# Patient Record
Sex: FEMALE | Race: WHITE | Employment: OTHER
[De-identification: names, ages, dates, MRNs, and addresses within clinical notes are randomized per-mention and may not be internally consistent; named-entity substitution may affect disease eponyms.]

---

## 2017-01-03 ENCOUNTER — OFFICE VISIT (OUTPATIENT)
Dept: CARDIOLOGY | Facility: CLINIC | Age: 67
End: 2017-01-03

## 2017-01-03 VITALS
SYSTOLIC BLOOD PRESSURE: 163 MMHG | OXYGEN SATURATION: 97 % | RESPIRATION RATE: 16 BRPM | HEART RATE: 81 BPM | BODY MASS INDEX: 25.83 KG/M2 | DIASTOLIC BLOOD PRESSURE: 95 MMHG | WEIGHT: 145.8 LBS | HEIGHT: 63 IN

## 2017-01-03 DIAGNOSIS — I20.9 ANGINA, CLASS II (HCC): Primary | ICD-10-CM

## 2017-01-03 DIAGNOSIS — I25.10 CORONARY ARTERY DISEASE INVOLVING NATIVE CORONARY ARTERY OF NATIVE HEART WITHOUT ANGINA PECTORIS: Chronic | ICD-10-CM

## 2017-01-03 PROCEDURE — 99213 OFFICE O/P EST LOW 20 MIN: CPT | Performed by: FAMILY MEDICINE

## 2017-05-03 ENCOUNTER — HOSPITAL ENCOUNTER (OUTPATIENT)
Age: 67
Discharge: HOME | End: 2017-05-03
Payer: MEDICARE

## 2017-05-03 ENCOUNTER — HOSPITAL ENCOUNTER (OUTPATIENT)
Dept: GENERAL RADIOLOGY | Age: 67
Discharge: HOME | End: 2017-05-03
Payer: MEDICARE

## 2017-05-03 DIAGNOSIS — M25.511 ACUTE PAIN OF RIGHT SHOULDER: ICD-10-CM

## 2017-05-03 PROCEDURE — 73030 X-RAY EXAM OF SHOULDER: CPT

## 2017-05-09 ENCOUNTER — HOSPITAL ENCOUNTER (OUTPATIENT)
Age: 67
Discharge: HOME | End: 2017-05-09
Payer: MEDICARE

## 2017-05-09 DIAGNOSIS — E78.5 HYPERLIPIDEMIA WITH TARGET LDL LESS THAN 70: Chronic | ICD-10-CM

## 2017-05-09 LAB
ALT SERPL-CCNC: 25 U/L (ref 5–33)
AST SERPL-CCNC: 23 U/L
CHOLESTEROL, FASTING: 204 MG/DL
CHOLESTEROL/HDL RATIO: 3.2
HDLC SERPL-MCNC: 64 MG/DL
LDL CHOLESTEROL: 108 MG/DL (ref 0–130)
TRIGLYCERIDE, FASTING: 158 MG/DL
VLDLC SERPL CALC-MCNC: ABNORMAL MG/DL (ref 1–30)

## 2017-05-09 PROCEDURE — 36415 COLL VENOUS BLD VENIPUNCTURE: CPT

## 2017-05-09 PROCEDURE — 80061 LIPID PANEL: CPT

## 2017-05-09 PROCEDURE — 84450 TRANSFERASE (AST) (SGOT): CPT

## 2017-05-09 PROCEDURE — 84460 ALANINE AMINO (ALT) (SGPT): CPT

## 2017-07-11 ENCOUNTER — OFFICE VISIT (OUTPATIENT)
Dept: CARDIOLOGY | Age: 67
End: 2017-07-11
Payer: MEDICARE

## 2017-07-11 VITALS
HEART RATE: 68 BPM | BODY MASS INDEX: 24.38 KG/M2 | SYSTOLIC BLOOD PRESSURE: 136 MMHG | RESPIRATION RATE: 16 BRPM | HEIGHT: 63 IN | WEIGHT: 137.6 LBS | OXYGEN SATURATION: 96 % | DIASTOLIC BLOOD PRESSURE: 85 MMHG

## 2017-07-11 DIAGNOSIS — I25.10 CORONARY ARTERY DISEASE INVOLVING NATIVE CORONARY ARTERY OF NATIVE HEART WITHOUT ANGINA PECTORIS: Chronic | ICD-10-CM

## 2017-07-11 DIAGNOSIS — I20.9 ANGINA, CLASS II (HCC): Primary | ICD-10-CM

## 2017-07-11 PROCEDURE — 1123F ACP DISCUSS/DSCN MKR DOCD: CPT | Performed by: FAMILY MEDICINE

## 2017-07-11 PROCEDURE — 1036F TOBACCO NON-USER: CPT | Performed by: FAMILY MEDICINE

## 2017-07-11 PROCEDURE — 1090F PRES/ABSN URINE INCON ASSESS: CPT | Performed by: FAMILY MEDICINE

## 2017-07-11 PROCEDURE — 99213 OFFICE O/P EST LOW 20 MIN: CPT | Performed by: FAMILY MEDICINE

## 2017-07-11 PROCEDURE — 3017F COLORECTAL CA SCREEN DOC REV: CPT | Performed by: FAMILY MEDICINE

## 2017-07-11 PROCEDURE — 93000 ELECTROCARDIOGRAM COMPLETE: CPT | Performed by: FAMILY MEDICINE

## 2017-07-11 PROCEDURE — G8598 ASA/ANTIPLAT THER USED: HCPCS | Performed by: FAMILY MEDICINE

## 2017-07-11 PROCEDURE — 3014F SCREEN MAMMO DOC REV: CPT | Performed by: FAMILY MEDICINE

## 2017-07-11 PROCEDURE — G8400 PT W/DXA NO RESULTS DOC: HCPCS | Performed by: FAMILY MEDICINE

## 2017-07-11 PROCEDURE — 4040F PNEUMOC VAC/ADMIN/RCVD: CPT | Performed by: FAMILY MEDICINE

## 2017-07-11 PROCEDURE — G8427 DOCREV CUR MEDS BY ELIG CLIN: HCPCS | Performed by: FAMILY MEDICINE

## 2017-07-11 PROCEDURE — G8420 CALC BMI NORM PARAMETERS: HCPCS | Performed by: FAMILY MEDICINE

## 2017-07-11 RX ORDER — DORZOLAMIDE HYDROCHLORIDE AND TIMOLOL MALEATE 20; 5 MG/ML; MG/ML
1 SOLUTION/ DROPS OPHTHALMIC 2 TIMES DAILY
COMMUNITY
Start: 2017-06-29 | End: 2017-09-19 | Stop reason: ALTCHOICE

## 2017-07-11 RX ORDER — AMLODIPINE BESYLATE 5 MG/1
2.5 TABLET ORAL DAILY
Qty: 45 TABLET | Refills: 3 | Status: SHIPPED | OUTPATIENT
Start: 2017-07-11 | End: 2019-04-26 | Stop reason: SDUPTHER

## 2017-09-19 ENCOUNTER — OFFICE VISIT (OUTPATIENT)
Dept: OBGYN | Age: 67
End: 2017-09-19
Payer: MEDICARE

## 2017-09-19 VITALS
BODY MASS INDEX: 23.67 KG/M2 | WEIGHT: 133.6 LBS | HEIGHT: 63 IN | DIASTOLIC BLOOD PRESSURE: 76 MMHG | SYSTOLIC BLOOD PRESSURE: 150 MMHG

## 2017-09-19 DIAGNOSIS — Z01.419 WOMEN'S ANNUAL ROUTINE GYNECOLOGICAL EXAMINATION: Primary | ICD-10-CM

## 2017-09-19 DIAGNOSIS — Z12.31 SCREENING MAMMOGRAM, ENCOUNTER FOR: ICD-10-CM

## 2017-09-19 PROCEDURE — G0101 CA SCREEN;PELVIC/BREAST EXAM: HCPCS | Performed by: ADVANCED PRACTICE MIDWIFE

## 2017-09-19 PROCEDURE — G8598 ASA/ANTIPLAT THER USED: HCPCS | Performed by: ADVANCED PRACTICE MIDWIFE

## 2017-11-02 ENCOUNTER — HOSPITAL ENCOUNTER (OUTPATIENT)
Dept: WOMENS IMAGING | Age: 67
Discharge: HOME | End: 2017-11-02
Payer: MEDICARE

## 2017-11-02 DIAGNOSIS — Z12.31 SCREENING MAMMOGRAM, ENCOUNTER FOR: ICD-10-CM

## 2017-11-02 PROCEDURE — G0202 SCR MAMMO BI INCL CAD: HCPCS

## 2017-11-29 RX ORDER — PANTOPRAZOLE SODIUM 20 MG/1
TABLET, DELAYED RELEASE ORAL
Qty: 90 TABLET | Refills: 3 | Status: SHIPPED | OUTPATIENT
Start: 2017-11-29 | End: 2018-11-12 | Stop reason: SDUPTHER

## 2017-11-29 RX ORDER — ATORVASTATIN CALCIUM 40 MG/1
TABLET, FILM COATED ORAL
Qty: 90 TABLET | Refills: 3 | Status: SHIPPED | OUTPATIENT
Start: 2017-11-29 | End: 2018-06-18 | Stop reason: SDUPTHER

## 2017-12-18 ENCOUNTER — OFFICE VISIT (OUTPATIENT)
Dept: CARDIOLOGY | Age: 67
End: 2017-12-18
Payer: MEDICARE

## 2017-12-18 VITALS
RESPIRATION RATE: 18 BRPM | SYSTOLIC BLOOD PRESSURE: 122 MMHG | WEIGHT: 138 LBS | OXYGEN SATURATION: 98 % | BODY MASS INDEX: 24.45 KG/M2 | HEIGHT: 63 IN | DIASTOLIC BLOOD PRESSURE: 78 MMHG | HEART RATE: 76 BPM

## 2017-12-18 DIAGNOSIS — I25.10 CORONARY ARTERY DISEASE INVOLVING NATIVE CORONARY ARTERY OF NATIVE HEART WITHOUT ANGINA PECTORIS: Chronic | ICD-10-CM

## 2017-12-18 DIAGNOSIS — R06.02 SHORTNESS OF BREATH ON EXERTION: Primary | ICD-10-CM

## 2017-12-18 DIAGNOSIS — I20.9 ANGINA, CLASS II (HCC): ICD-10-CM

## 2017-12-18 PROCEDURE — 3014F SCREEN MAMMO DOC REV: CPT | Performed by: FAMILY MEDICINE

## 2017-12-18 PROCEDURE — 99214 OFFICE O/P EST MOD 30 MIN: CPT | Performed by: FAMILY MEDICINE

## 2017-12-18 PROCEDURE — 1123F ACP DISCUSS/DSCN MKR DOCD: CPT | Performed by: FAMILY MEDICINE

## 2017-12-18 PROCEDURE — G8598 ASA/ANTIPLAT THER USED: HCPCS | Performed by: FAMILY MEDICINE

## 2017-12-18 PROCEDURE — 1090F PRES/ABSN URINE INCON ASSESS: CPT | Performed by: FAMILY MEDICINE

## 2017-12-18 PROCEDURE — G8400 PT W/DXA NO RESULTS DOC: HCPCS | Performed by: FAMILY MEDICINE

## 2017-12-18 PROCEDURE — G8420 CALC BMI NORM PARAMETERS: HCPCS | Performed by: FAMILY MEDICINE

## 2017-12-18 PROCEDURE — G8484 FLU IMMUNIZE NO ADMIN: HCPCS | Performed by: FAMILY MEDICINE

## 2017-12-18 PROCEDURE — 1036F TOBACCO NON-USER: CPT | Performed by: FAMILY MEDICINE

## 2017-12-18 PROCEDURE — 4040F PNEUMOC VAC/ADMIN/RCVD: CPT | Performed by: FAMILY MEDICINE

## 2017-12-18 PROCEDURE — G8427 DOCREV CUR MEDS BY ELIG CLIN: HCPCS | Performed by: FAMILY MEDICINE

## 2017-12-18 PROCEDURE — 3017F COLORECTAL CA SCREEN DOC REV: CPT | Performed by: FAMILY MEDICINE

## 2017-12-18 RX ORDER — CYCLOSPORINE 0.5 MG/ML
1 EMULSION OPHTHALMIC 2 TIMES DAILY PRN
COMMUNITY
Start: 2017-09-29

## 2017-12-18 RX ORDER — DORZOLAMIDE HYDROCHLORIDE AND TIMOLOL MALEATE 20; 5 MG/ML; MG/ML
1 SOLUTION/ DROPS OPHTHALMIC 2 TIMES DAILY
COMMUNITY
Start: 2017-09-29 | End: 2019-05-17 | Stop reason: ALTCHOICE

## 2017-12-18 NOTE — PROGRESS NOTES
LUIS Carter am scribing for and in the presence of Dr. Diony Shaw    Patient: Brenda Barriga  : 1950  Date of Visit: 2017    REASON FOR VISIT / CONSULTATION: CP, CAD,    Dear Dr. Jamshid Fuller    I had the pleasure of seeing your patient Brenda Barriga in followup today. As you know, Ms. Marquez is a 79 y.o. female with a history of coronary artery disease s/p 4 stents, the most recent one being in . Ms. Marty Medrano reports doing realativly well since last visit but does complain of some increased shortness of breath with exertion and reports feeling fatigue. She reports having fair exercise capacity and denies any lower extremity swelling. She also denied any current or recent chest pain,abdominal pain, bleeding problems, problems with her medications or any other concerns at this time      Past Medical History:   Diagnosis Date    CAD (coronary artery disease) ,     4 total stents. 2 Stents in RCA, 1 in small OM1.  H/O cardiovascular stress test 13, 14    Relatively normal without evidence of significant ischemia or infarction. global LV systolic function was normal w/o regional wall motion abnormalities. No significant EKG evidence of ischemia during monitoring w/o significant associated arrhythmias.  Tay score is 5.     H/O echocardiogram 13, 14    EF>55%, mild concentric LVH    Hx of heart artery stent 10/06/2011    Lesion of Prox RCA 75% stenosis-Drug Eluting stent to RCA    Hx of percutaneous left heart catheterization 2011    LMCA, LAD & RAMUS- normal, LCX-lesion on Mid CX 40% stenosis, RCA-Patent stent, EF 60%    Hx of percutaneous left heart catheterization 2012    Normal renals, Patent RCA stents with mid LCX stenosis IVUS suggest 60-70% stenosis and due to continues ischemia, a LISA was inserted    Hx of percutaneous left heart catheterization 2012    LMCA & LAD -Normal, LCXMild luminal irregularities, Patent prior stent, RCA-Patent stents in Prox and mid segment    Hyperlipidemia     Normal nuclear stress test 06/06/2012       CURRENT ALLERGIES: Benadryl [diphenhydramine]; Iv dye [iodides]; and Pcn [penicillins] REVIEW OF SYSTEMS: 10 systems were reviewed. Pertinent positives and negatives as above, all else negative. Past Surgical History:   Procedure Laterality Date    CARPAL TUNNEL RELEASE      CORONARY ANGIOPLASTY WITH STENT PLACEMENT      NECK SURGERY      disc replaced in neck    Social History:  Social History   Substance Use Topics    Smoking status: Former Smoker     Packs/day: 2.00     Years: 20.00     Types: Cigarettes     Quit date: 5/7/2000    Smokeless tobacco: Never Used    Alcohol use Yes      Comment: social        CURRENT MEDICATIONS:  Outpatient Prescriptions Marked as Taking for the 12/18/17 encounter (Office Visit) with Geovanni Saba MD   Medication Sig Dispense Refill    RESTASIS 0.05 % ophthalmic emulsion       dorzolamide-timolol (COSOPT) 22.3-6.8 MG/ML ophthalmic solution Place 1 drop into the right eye 2 times daily       atorvastatin (LIPITOR) 40 MG tablet TAKE 1 TABLET BY MOUTH  DAILY 90 tablet 3    pantoprazole (PROTONIX) 20 MG tablet TAKE 1 TABLET BY MOUTH  DAILY 90 tablet 3    budesonide-formoterol (SYMBICORT) 160-4.5 MCG/ACT AERO Inhale 2 puffs into the lungs 2 times daily 3 Inhaler 3    rOPINIRole (REQUIP) 0.5 MG tablet Take 1 tablet by mouth See Admin Instructions Take 2 tablets every day. Take 1-3 hours before bedtime.  180 tablet 3    amLODIPine (NORVASC) 5 MG tablet Take 0.5 tablets by mouth daily 45 tablet 3    albuterol sulfate HFA (PROAIR HFA) 108 (90 BASE) MCG/ACT inhaler Inhale 2 puffs into the lungs every 6 hours as needed for Wheezing 3 Inhaler 3    nitroGLYCERIN (NITROSTAT) 0.4 MG SL tablet Place 1 tablet under the tongue every 5 minutes as needed 25 tablet 3    latanoprost (XALATAN) 0.005 % ophthalmic solution Place 1 drop into both eyes nightly       CO2 25 12/12/2014    INR 1.0 06/04/2012        ASSESSMENT:  Encounter Diagnoses   Name Primary?  Angina, class II ([facility] [address] Yes    Coronary artery disease involving native coronary artery of native heart without angina pectoris         PLAN:  Angina: [place] Class II-III: I think that her increased shortness of breath may be a anginal equivalent for her. · Continue Amlidipine  · Additional Testing: Because of her current increased shortness of breath with exertion which can certainly be caused by significant coronary artery disease, I ordered a treadmill stress test with SPECT imaging to try and rule out this possibility. · Atherosclerotic Heart Disease:  Antiplatelet Agent: Continue Aspirin 81 mg daily. Beta blocker/CCB: unable to tolerate beta-blocker. Continue amlodipine. · Statin Therapy: Continue atorvastatin (Lipitor) 40 mg nightly. · Additional Testing: I ordered a treadmill stress test with Myoview imaging to help risk stratify the likelihood of ischemia as the source of her symptoms. Finally, I recommended that she continue her other medications and follow up with you as previously scheduled. FOLLOW UP:   I told Ms. Marquez to call my office if she had any problems, but otherwise told her to Return in about 6 months (around 6/18/2018). However, I would be happy to see her sooner should the need arise. Once again, thank you for allowing me to participate in this patients care. Please do not hesitate to contact me could I be of further assistance. Sincerely,  Angie Ackerman. Bud BECKMAN, MS, F.A.C.C. [facility] Specialist, [address] [facility], [facility], [place] [address], [address] [facility]  Phone: [phone], Fax: [phone]     The documentation recorded by the scribe, accurately and completely reflects the services I personally performed and the decisions made by me. Latasha Avalos.  Richie Rosen MD, MS, F.A.C.C. 12/18/2017     I believe that the risk of significant morbidity and mortality related to the patient's current medical conditions are: Intermediate.

## 2017-12-28 ENCOUNTER — HOSPITAL ENCOUNTER (OUTPATIENT)
Dept: NON INVASIVE DIAGNOSTICS | Age: 67
Discharge: HOME | End: 2017-12-28
Payer: MEDICARE

## 2017-12-28 DIAGNOSIS — I25.10 CORONARY ARTERY DISEASE INVOLVING NATIVE CORONARY ARTERY OF NATIVE HEART WITHOUT ANGINA PECTORIS: Chronic | ICD-10-CM

## 2017-12-28 DIAGNOSIS — I20.9 ANGINA, CLASS II (HCC): ICD-10-CM

## 2017-12-28 PROCEDURE — 3430000000 HC RX DIAGNOSTIC RADIOPHARMACEUTICAL: Performed by: FAMILY MEDICINE

## 2017-12-28 PROCEDURE — A9500 TC99M SESTAMIBI: HCPCS | Performed by: FAMILY MEDICINE

## 2017-12-28 PROCEDURE — 93017 CV STRESS TEST TRACING ONLY: CPT

## 2017-12-28 PROCEDURE — 78452 HT MUSCLE IMAGE SPECT MULT: CPT

## 2017-12-28 RX ADMIN — Medication 30 MILLICURIE: at 08:59

## 2017-12-29 ENCOUNTER — HOSPITAL ENCOUNTER (OUTPATIENT)
Dept: NON INVASIVE DIAGNOSTICS | Age: 67
Discharge: HOME | End: 2017-12-29
Payer: MEDICARE

## 2017-12-29 PROCEDURE — A9500 TC99M SESTAMIBI: HCPCS | Performed by: FAMILY MEDICINE

## 2017-12-29 PROCEDURE — 3430000000 HC RX DIAGNOSTIC RADIOPHARMACEUTICAL: Performed by: FAMILY MEDICINE

## 2017-12-29 RX ADMIN — Medication 30.9 MILLICURIE: at 12:37

## 2018-01-05 NOTE — PROCEDURES
significant electrocardiographic evidence of myocardial ischemia  during EKG monitoring without significant associated arrhythmias. Overall, these results are most consistent with a low/intermediate risk for  significant coronary artery disease. Depending on the patient symptoms and level of clinical suspicion,  aggressive medical management vs. additional testing by coronary  angiography may be indicated. The sensitivity for detecting ischemia on this test may have been reduced  due to the patient being on a calcium channel blocker. King Jay    D: 01/05/2018 6:55:59       T: 01/05/2018 6:59:13     SYDNEY/DAVID_EDIT  Job#: [id number]    Doc#: [id number]    CC:  Roger Trejo.  Barrett Johnson

## 2018-05-09 ENCOUNTER — HOSPITAL ENCOUNTER (OUTPATIENT)
Age: 68
Discharge: HOME | End: 2018-05-09
Payer: MEDICARE

## 2018-05-09 DIAGNOSIS — R20.2 NUMBNESS AND TINGLING OF FOOT: ICD-10-CM

## 2018-05-09 DIAGNOSIS — R20.0 NUMBNESS AND TINGLING OF FOOT: ICD-10-CM

## 2018-05-09 DIAGNOSIS — R53.83 FATIGUE, UNSPECIFIED TYPE: ICD-10-CM

## 2018-05-09 LAB
ABSOLUTE EOS #: 0.17 K/UL (ref 0–0.44)
ABSOLUTE IMMATURE GRANULOCYTE: <0.03 K/UL (ref 0–0.3)
ABSOLUTE LYMPH #: 1.32 K/UL (ref 1.1–3.7)
ABSOLUTE MONO #: 0.44 K/UL (ref 0.1–1.2)
ALBUMIN SERPL-MCNC: 4.2 G/DL (ref 3.5–5.2)
ALBUMIN/GLOBULIN RATIO: 1.4 (ref 1–2.5)
ALP BLD-CCNC: 79 U/L (ref 35–104)
ALT SERPL-CCNC: 21 U/L (ref 5–33)
ANION GAP SERPL CALCULATED.3IONS-SCNC: 14 MMOL/L (ref 9–17)
AST SERPL-CCNC: 20 U/L
BASOPHILS # BLD: 1 % (ref 0–2)
BASOPHILS ABSOLUTE: 0.03 K/UL (ref 0–0.2)
BILIRUB SERPL-MCNC: 0.4 MG/DL (ref 0.3–1.2)
BUN BLDV-MCNC: 13 MG/DL (ref 8–23)
BUN/CREAT BLD: 28 (ref 9–20)
CALCIUM SERPL-MCNC: 9.1 MG/DL (ref 8.6–10.4)
CHLORIDE BLD-SCNC: 100 MMOL/L (ref 98–107)
CO2: 24 MMOL/L (ref 20–31)
CREAT SERPL-MCNC: 0.46 MG/DL (ref 0.5–0.9)
DIFFERENTIAL TYPE: NORMAL
EOSINOPHILS RELATIVE PERCENT: 3 % (ref 1–4)
GFR AFRICAN AMERICAN: >60 ML/MIN
GFR NON-AFRICAN AMERICAN: >60 ML/MIN
GFR SERPL CREATININE-BSD FRML MDRD: ABNORMAL ML/MIN/{1.73_M2}
GFR SERPL CREATININE-BSD FRML MDRD: ABNORMAL ML/MIN/{1.73_M2}
GLUCOSE BLD-MCNC: 94 MG/DL (ref 70–99)
HCT VFR BLD CALC: 40.3 % (ref 36.3–47.1)
HEMOGLOBIN: 13.5 G/DL (ref 11.9–15.1)
IMMATURE GRANULOCYTES: 0 %
LYMPHOCYTES # BLD: 25 % (ref 24–43)
MCH RBC QN AUTO: 32.1 PG (ref 25.2–33.5)
MCHC RBC AUTO-ENTMCNC: 33.5 G/DL (ref 28.4–34.8)
MCV RBC AUTO: 96 FL (ref 82.6–102.9)
MONOCYTES # BLD: 8 % (ref 3–12)
NRBC AUTOMATED: 0 PER 100 WBC
PDW BLD-RTO: 12.5 % (ref 11.8–14.4)
PLATELET # BLD: 252 K/UL (ref 138–453)
PLATELET ESTIMATE: NORMAL
PMV BLD AUTO: 8.7 FL (ref 8.1–13.5)
POTASSIUM SERPL-SCNC: 4.2 MMOL/L (ref 3.7–5.3)
RBC # BLD: 4.2 M/UL (ref 3.95–5.11)
RBC # BLD: NORMAL 10*6/UL
SEG NEUTROPHILS: 63 % (ref 36–65)
SEGMENTED NEUTROPHILS ABSOLUTE COUNT: 3.38 K/UL (ref 1.5–8.1)
SODIUM BLD-SCNC: 138 MMOL/L (ref 135–144)
TOTAL PROTEIN: 7.3 G/DL (ref 6.4–8.3)
TSH SERPL DL<=0.05 MIU/L-ACNC: 3.09 MIU/L (ref 0.3–5)
WBC # BLD: 5.4 K/UL (ref 3.5–11.3)
WBC # BLD: NORMAL 10*3/UL

## 2018-05-09 PROCEDURE — 84443 ASSAY THYROID STIM HORMONE: CPT

## 2018-05-09 PROCEDURE — 36415 COLL VENOUS BLD VENIPUNCTURE: CPT

## 2018-05-09 PROCEDURE — 85025 COMPLETE CBC W/AUTO DIFF WBC: CPT

## 2018-05-09 PROCEDURE — 80053 COMPREHEN METABOLIC PANEL: CPT

## 2018-05-16 ENCOUNTER — HOSPITAL ENCOUNTER (OUTPATIENT)
Dept: VASCULAR LAB | Age: 68
Discharge: HOME | End: 2018-05-18
Payer: MEDICARE

## 2018-05-16 DIAGNOSIS — R20.0 NUMBNESS AND TINGLING OF FOOT: ICD-10-CM

## 2018-05-16 DIAGNOSIS — I25.10 CORONARY ARTERY DISEASE INVOLVING NATIVE CORONARY ARTERY OF NATIVE HEART WITHOUT ANGINA PECTORIS: Chronic | ICD-10-CM

## 2018-05-16 DIAGNOSIS — R20.2 NUMBNESS AND TINGLING OF FOOT: ICD-10-CM

## 2018-05-16 DIAGNOSIS — R09.89 OTHER SPECIFIED SYMPTOMS AND SIGNS INVOLVING THE CIRCULATORY AND RESPIRATORY SYSTEMS: ICD-10-CM

## 2018-05-16 PROCEDURE — 93923 UPR/LXTR ART STDY 3+ LVLS: CPT

## 2018-05-23 ENCOUNTER — INITIAL CONSULT (OUTPATIENT)
Dept: VASCULAR SURGERY | Age: 68
End: 2018-05-23
Payer: MEDICARE

## 2018-05-23 ENCOUNTER — HOSPITAL ENCOUNTER (OUTPATIENT)
Age: 68
Discharge: HOME | End: 2018-05-23
Payer: MEDICARE

## 2018-05-23 VITALS
HEART RATE: 76 BPM | DIASTOLIC BLOOD PRESSURE: 84 MMHG | BODY MASS INDEX: 24.56 KG/M2 | WEIGHT: 138.6 LBS | HEIGHT: 63 IN | RESPIRATION RATE: 17 BRPM | SYSTOLIC BLOOD PRESSURE: 163 MMHG

## 2018-05-23 DIAGNOSIS — I77.6 VASCULITIS (HCC): ICD-10-CM

## 2018-05-23 DIAGNOSIS — I77.6 VASCULITIS (HCC): Primary | ICD-10-CM

## 2018-05-23 LAB
C-REACTIVE PROTEIN: 2.5 MG/L (ref 0–5)
SEDIMENTATION RATE, ERYTHROCYTE: 14 MM (ref 0–20)

## 2018-05-23 PROCEDURE — 1090F PRES/ABSN URINE INCON ASSESS: CPT | Performed by: INTERNAL MEDICINE

## 2018-05-23 PROCEDURE — 99203 OFFICE O/P NEW LOW 30 MIN: CPT | Performed by: INTERNAL MEDICINE

## 2018-05-23 PROCEDURE — G8400 PT W/DXA NO RESULTS DOC: HCPCS | Performed by: INTERNAL MEDICINE

## 2018-05-23 PROCEDURE — 1123F ACP DISCUSS/DSCN MKR DOCD: CPT | Performed by: INTERNAL MEDICINE

## 2018-05-23 PROCEDURE — 85651 RBC SED RATE NONAUTOMATED: CPT

## 2018-05-23 PROCEDURE — 1036F TOBACCO NON-USER: CPT | Performed by: INTERNAL MEDICINE

## 2018-05-23 PROCEDURE — 86140 C-REACTIVE PROTEIN: CPT

## 2018-05-23 PROCEDURE — 4040F PNEUMOC VAC/ADMIN/RCVD: CPT | Performed by: INTERNAL MEDICINE

## 2018-05-23 PROCEDURE — G8420 CALC BMI NORM PARAMETERS: HCPCS | Performed by: INTERNAL MEDICINE

## 2018-05-23 PROCEDURE — G8427 DOCREV CUR MEDS BY ELIG CLIN: HCPCS | Performed by: INTERNAL MEDICINE

## 2018-05-23 PROCEDURE — 3017F COLORECTAL CA SCREEN DOC REV: CPT | Performed by: INTERNAL MEDICINE

## 2018-05-23 PROCEDURE — G8598 ASA/ANTIPLAT THER USED: HCPCS | Performed by: INTERNAL MEDICINE

## 2018-05-23 PROCEDURE — 36415 COLL VENOUS BLD VENIPUNCTURE: CPT

## 2018-05-25 ENCOUNTER — HOSPITAL ENCOUNTER (OUTPATIENT)
Age: 68
Discharge: HOME | End: 2018-05-27
Payer: MEDICARE

## 2018-05-25 ENCOUNTER — HOSPITAL ENCOUNTER (OUTPATIENT)
Dept: GENERAL RADIOLOGY | Age: 68
Discharge: HOME | End: 2018-05-27
Payer: MEDICARE

## 2018-05-25 DIAGNOSIS — M54.2 NECK PAIN: ICD-10-CM

## 2018-05-25 PROCEDURE — 72050 X-RAY EXAM NECK SPINE 4/5VWS: CPT

## 2018-05-29 ENCOUNTER — HOSPITAL ENCOUNTER (OUTPATIENT)
Dept: MRI IMAGING | Age: 68
Discharge: HOME | End: 2018-05-31
Payer: MEDICARE

## 2018-05-29 DIAGNOSIS — M54.2 NECK PAIN: ICD-10-CM

## 2018-05-29 PROCEDURE — 72141 MRI NECK SPINE W/O DYE: CPT

## 2018-06-06 ENCOUNTER — OFFICE VISIT (OUTPATIENT)
Dept: VASCULAR SURGERY | Age: 68
End: 2018-06-06
Payer: MEDICARE

## 2018-06-06 VITALS
HEART RATE: 81 BPM | SYSTOLIC BLOOD PRESSURE: 112 MMHG | BODY MASS INDEX: 25.05 KG/M2 | TEMPERATURE: 98.8 F | RESPIRATION RATE: 16 BRPM | HEIGHT: 63 IN | WEIGHT: 141.4 LBS | DIASTOLIC BLOOD PRESSURE: 76 MMHG

## 2018-06-06 DIAGNOSIS — I73.9 PVD (PERIPHERAL VASCULAR DISEASE) WITH CLAUDICATION (HCC): Primary | ICD-10-CM

## 2018-06-06 PROCEDURE — 3017F COLORECTAL CA SCREEN DOC REV: CPT | Performed by: INTERNAL MEDICINE

## 2018-06-06 PROCEDURE — 1090F PRES/ABSN URINE INCON ASSESS: CPT | Performed by: INTERNAL MEDICINE

## 2018-06-06 PROCEDURE — 99213 OFFICE O/P EST LOW 20 MIN: CPT | Performed by: INTERNAL MEDICINE

## 2018-06-06 PROCEDURE — G8419 CALC BMI OUT NRM PARAM NOF/U: HCPCS | Performed by: INTERNAL MEDICINE

## 2018-06-06 PROCEDURE — G8427 DOCREV CUR MEDS BY ELIG CLIN: HCPCS | Performed by: INTERNAL MEDICINE

## 2018-06-06 RX ORDER — ROPINIROLE 0.5 MG/1
1 TABLET, FILM COATED ORAL DAILY
COMMUNITY
End: 2018-10-03 | Stop reason: SDUPTHER

## 2018-06-13 PROBLEM — I73.9 PVD (PERIPHERAL VASCULAR DISEASE) WITH CLAUDICATION (HCC): Status: ACTIVE | Noted: 2018-06-13

## 2018-06-18 ENCOUNTER — OFFICE VISIT (OUTPATIENT)
Dept: CARDIOLOGY | Age: 68
End: 2018-06-18
Payer: MEDICARE

## 2018-06-18 VITALS
BODY MASS INDEX: 24.98 KG/M2 | HEIGHT: 63 IN | HEART RATE: 79 BPM | DIASTOLIC BLOOD PRESSURE: 85 MMHG | SYSTOLIC BLOOD PRESSURE: 130 MMHG | WEIGHT: 141 LBS

## 2018-06-18 DIAGNOSIS — R94.39 ABNORMAL STRESS TEST: Primary | ICD-10-CM

## 2018-06-18 DIAGNOSIS — G47.33 OSA (OBSTRUCTIVE SLEEP APNEA): ICD-10-CM

## 2018-06-18 DIAGNOSIS — E78.5 HYPERLIPIDEMIA WITH TARGET LDL LESS THAN 70: Chronic | ICD-10-CM

## 2018-06-18 DIAGNOSIS — R53.83 FATIGUE, UNSPECIFIED TYPE: ICD-10-CM

## 2018-06-18 DIAGNOSIS — I25.10 CORONARY ARTERY DISEASE INVOLVING NATIVE CORONARY ARTERY OF NATIVE HEART WITHOUT ANGINA PECTORIS: Chronic | ICD-10-CM

## 2018-06-18 DIAGNOSIS — I20.9 ANGINA, CLASS II (HCC): ICD-10-CM

## 2018-06-18 PROCEDURE — G8400 PT W/DXA NO RESULTS DOC: HCPCS | Performed by: FAMILY MEDICINE

## 2018-06-18 PROCEDURE — 1036F TOBACCO NON-USER: CPT | Performed by: FAMILY MEDICINE

## 2018-06-18 PROCEDURE — G8420 CALC BMI NORM PARAMETERS: HCPCS | Performed by: FAMILY MEDICINE

## 2018-06-18 PROCEDURE — G8598 ASA/ANTIPLAT THER USED: HCPCS | Performed by: FAMILY MEDICINE

## 2018-06-18 PROCEDURE — G8427 DOCREV CUR MEDS BY ELIG CLIN: HCPCS | Performed by: FAMILY MEDICINE

## 2018-06-18 PROCEDURE — 3017F COLORECTAL CA SCREEN DOC REV: CPT | Performed by: FAMILY MEDICINE

## 2018-06-18 PROCEDURE — 99215 OFFICE O/P EST HI 40 MIN: CPT | Performed by: FAMILY MEDICINE

## 2018-06-18 PROCEDURE — 93000 ELECTROCARDIOGRAM COMPLETE: CPT | Performed by: FAMILY MEDICINE

## 2018-06-18 PROCEDURE — 1090F PRES/ABSN URINE INCON ASSESS: CPT | Performed by: FAMILY MEDICINE

## 2018-06-18 PROCEDURE — 1123F ACP DISCUSS/DSCN MKR DOCD: CPT | Performed by: FAMILY MEDICINE

## 2018-06-18 PROCEDURE — 4040F PNEUMOC VAC/ADMIN/RCVD: CPT | Performed by: FAMILY MEDICINE

## 2018-06-18 RX ORDER — ATORVASTATIN CALCIUM 40 MG/1
40 TABLET, FILM COATED ORAL DAILY
Qty: 90 TABLET | Refills: 3 | Status: SHIPPED | OUTPATIENT
Start: 2018-06-18 | End: 2019-04-01 | Stop reason: SDUPTHER

## 2018-07-02 ENCOUNTER — TELEPHONE (OUTPATIENT)
Dept: CARDIOLOGY | Age: 68
End: 2018-07-02

## 2018-08-06 ENCOUNTER — HOSPITAL ENCOUNTER (OUTPATIENT)
Age: 68
Discharge: HOME | End: 2018-08-06
Payer: MEDICARE

## 2018-08-06 ENCOUNTER — OFFICE VISIT (OUTPATIENT)
Dept: CARDIOLOGY | Age: 68
End: 2018-08-06
Payer: MEDICARE

## 2018-08-06 VITALS
WEIGHT: 142.4 LBS | HEART RATE: 81 BPM | HEIGHT: 63 IN | OXYGEN SATURATION: 98 % | BODY MASS INDEX: 25.23 KG/M2 | RESPIRATION RATE: 18 BRPM | DIASTOLIC BLOOD PRESSURE: 83 MMHG | SYSTOLIC BLOOD PRESSURE: 125 MMHG

## 2018-08-06 DIAGNOSIS — I25.10 ASHD (ARTERIOSCLEROTIC HEART DISEASE): ICD-10-CM

## 2018-08-06 DIAGNOSIS — E78.5 HYPERLIPIDEMIA WITH TARGET LDL LESS THAN 70: Chronic | ICD-10-CM

## 2018-08-06 DIAGNOSIS — G47.33 OSA (OBSTRUCTIVE SLEEP APNEA): ICD-10-CM

## 2018-08-06 DIAGNOSIS — I20.9 ANGINA, CLASS II (HCC): ICD-10-CM

## 2018-08-06 DIAGNOSIS — R94.39 ABNORMAL CARDIOVASCULAR STRESS TEST: ICD-10-CM

## 2018-08-06 DIAGNOSIS — R94.39 ABNORMAL CARDIOVASCULAR STRESS TEST: Primary | ICD-10-CM

## 2018-08-06 LAB
ANION GAP SERPL CALCULATED.3IONS-SCNC: 13 MMOL/L (ref 9–17)
BUN BLDV-MCNC: 11 MG/DL (ref 8–23)
BUN/CREAT BLD: 22 (ref 9–20)
CALCIUM SERPL-MCNC: 10 MG/DL (ref 8.6–10.4)
CHLORIDE BLD-SCNC: 99 MMOL/L (ref 98–107)
CO2: 29 MMOL/L (ref 20–31)
CREAT SERPL-MCNC: 0.51 MG/DL (ref 0.5–0.9)
GFR AFRICAN AMERICAN: >60 ML/MIN
GFR NON-AFRICAN AMERICAN: >60 ML/MIN
GFR SERPL CREATININE-BSD FRML MDRD: ABNORMAL ML/MIN/{1.73_M2}
GFR SERPL CREATININE-BSD FRML MDRD: ABNORMAL ML/MIN/{1.73_M2}
GLUCOSE BLD-MCNC: 80 MG/DL (ref 70–99)
HCT VFR BLD CALC: 41.3 % (ref 36.3–47.1)
HEMOGLOBIN: 13.8 G/DL (ref 11.9–15.1)
MCH RBC QN AUTO: 32.2 PG (ref 25.2–33.5)
MCHC RBC AUTO-ENTMCNC: 33.4 G/DL (ref 28.4–34.8)
MCV RBC AUTO: 96.3 FL (ref 82.6–102.9)
NRBC AUTOMATED: 0 PER 100 WBC
PDW BLD-RTO: 12.5 % (ref 11.8–14.4)
PLATELET # BLD: 293 K/UL (ref 138–453)
PMV BLD AUTO: 9.5 FL (ref 8.1–13.5)
POTASSIUM SERPL-SCNC: 4.1 MMOL/L (ref 3.7–5.3)
RBC # BLD: 4.29 M/UL (ref 3.95–5.11)
SODIUM BLD-SCNC: 141 MMOL/L (ref 135–144)
WBC # BLD: 6.8 K/UL (ref 3.5–11.3)

## 2018-08-06 PROCEDURE — 1101F PT FALLS ASSESS-DOCD LE1/YR: CPT | Performed by: FAMILY MEDICINE

## 2018-08-06 PROCEDURE — 85027 COMPLETE CBC AUTOMATED: CPT

## 2018-08-06 PROCEDURE — G8427 DOCREV CUR MEDS BY ELIG CLIN: HCPCS | Performed by: FAMILY MEDICINE

## 2018-08-06 PROCEDURE — 3017F COLORECTAL CA SCREEN DOC REV: CPT | Performed by: FAMILY MEDICINE

## 2018-08-06 PROCEDURE — G8400 PT W/DXA NO RESULTS DOC: HCPCS | Performed by: FAMILY MEDICINE

## 2018-08-06 PROCEDURE — 80048 BASIC METABOLIC PNL TOTAL CA: CPT

## 2018-08-06 PROCEDURE — 99215 OFFICE O/P EST HI 40 MIN: CPT | Performed by: FAMILY MEDICINE

## 2018-08-06 PROCEDURE — 1123F ACP DISCUSS/DSCN MKR DOCD: CPT | Performed by: FAMILY MEDICINE

## 2018-08-06 PROCEDURE — 36415 COLL VENOUS BLD VENIPUNCTURE: CPT

## 2018-08-06 PROCEDURE — 1090F PRES/ABSN URINE INCON ASSESS: CPT | Performed by: FAMILY MEDICINE

## 2018-08-06 PROCEDURE — 4040F PNEUMOC VAC/ADMIN/RCVD: CPT | Performed by: FAMILY MEDICINE

## 2018-08-06 PROCEDURE — G8419 CALC BMI OUT NRM PARAM NOF/U: HCPCS | Performed by: FAMILY MEDICINE

## 2018-08-06 PROCEDURE — 1036F TOBACCO NON-USER: CPT | Performed by: FAMILY MEDICINE

## 2018-08-06 PROCEDURE — G8598 ASA/ANTIPLAT THER USED: HCPCS | Performed by: FAMILY MEDICINE

## 2018-08-06 RX ORDER — PREDNISONE 20 MG/1
TABLET ORAL
Qty: 6 TABLET | Refills: 0 | Status: SHIPPED | OUTPATIENT
Start: 2018-08-06 | End: 2018-08-06

## 2018-08-06 RX ORDER — PREDNISONE 20 MG/1
TABLET ORAL
Qty: 6 TABLET | Refills: 0 | Status: SHIPPED | OUTPATIENT
Start: 2018-08-06 | End: 2018-08-16

## 2018-08-06 NOTE — PROGRESS NOTES
cardiovascular stress test 5/22/13, 6/25/14    Relatively normal without evidence of significant ischemia or infarction. global LV systolic function was normal w/o regional wall motion abnormalities. No significant EKG evidence of ischemia during monitoring w/o significant associated arrhythmias. Tay score is 5.     H/O cardiovascular stress test 12/28/2017    Larglely normal myocardial perfusion imaging with soft tissue artifact but w/o evidence of significant myocardial ischemia or infarction. results are most consistent with low/intermediate risk for significant CAD    H/O echocardiogram 5/21/13, 6/24/14    EF>55%, mild concentric LVH    Hx of heart artery stent 10/06/2011    Lesion of Prox RCA 75% stenosis-Drug Eluting stent to RCA    Hx of percutaneous left heart catheterization 04/07/2011    LMCA, LAD & RAMUS- normal, LCX-lesion on Mid CX 40% stenosis, RCA-Patent stent, EF 60%    Hx of percutaneous left heart catheterization 04/11/2012    Normal renals, Patent RCA stents with mid LCX stenosis IVUS suggest 60-70% stenosis and due to continues ischemia, a LISA was inserted    Hx of percutaneous left heart catheterization 09/27/2012    LMCA & LAD -Normal, LCXMild luminal irregularities, Patent prior stent, RCA-Patent stents in Prox and mid segment    Hyperlipidemia     Normal nuclear stress test 06/06/2012       CURRENT ALLERGIES: Benadryl [diphenhydramine]; Iv dye [iodides]; and Pcn [penicillins] REVIEW OF SYSTEMS: 10 systems were reviewed. Pertinent positives and negatives as above, all else negative.      Past Surgical History:   Procedure Laterality Date    CARPAL TUNNEL RELEASE      CORONARY ANGIOPLASTY WITH STENT PLACEMENT      NECK SURGERY      disc replaced in neck    Social History:  Social History   Substance Use Topics    Smoking status: Former Smoker     Packs/day: 2.00     Years: 20.00     Types: Cigarettes     Quit date: 5/7/2000    Smokeless tobacco: Never Used    Alcohol use Yes Comment: social        CURRENT MEDICATIONS:  Outpatient Prescriptions Marked as Taking for the 8/6/18 encounter (Office Visit) with Mesfin Buenrostro MD   Medication Sig Dispense Refill    atorvastatin (LIPITOR) 40 MG tablet Take 1 tablet by mouth daily 90 tablet 3    rOPINIRole (REQUIP) 0.5 MG tablet Take 1 mg by mouth daily      albuterol sulfate  (90 Base) MCG/ACT inhaler Inhale 2 puffs into the lungs every 6 hours as needed for Wheezing 3 Inhaler 3    RESTASIS 0.05 % ophthalmic emulsion Place 1 drop into both eyes 3 times daily       dorzolamide-timolol (COSOPT) 22.3-6.8 MG/ML ophthalmic solution Place 1 drop into the right eye 2 times daily       pantoprazole (PROTONIX) 20 MG tablet TAKE 1 TABLET BY MOUTH  DAILY 90 tablet 3    budesonide-formoterol (SYMBICORT) 160-4.5 MCG/ACT AERO Inhale 2 puffs into the lungs 2 times daily (Patient taking differently: Inhale 2 puffs into the lungs 2 times daily as needed ) 3 Inhaler 3    amLODIPine (NORVASC) 5 MG tablet Take 0.5 tablets by mouth daily (Patient taking differently: Take 2.5 mg by mouth daily Takes 3 or 4 times per week) 45 tablet 3    nitroGLYCERIN (NITROSTAT) 0.4 MG SL tablet Place 1 tablet under the tongue every 5 minutes as needed 25 tablet 3    latanoprost (XALATAN) 0.005 % ophthalmic solution Place 1 drop into both eyes nightly       aspirin 81 MG tablet Take 81 mg by mouth daily. FAMILY HISTORY: family history includes Cancer in her mother. PHYSICAL EXAM:   /83 (Site: Left Arm, Position: Sitting, Cuff Size: Medium Adult)   Pulse 81   Resp 18   Ht 5' 3\" (1.6 m)   Wt 142 lb 6.4 oz (64.6 kg)   SpO2 98%   BMI 25.23 kg/m²  Body mass index is 25.23 kg/m². Constitutional: She is oriented to person, place, and time. She appears well-developed and well-nourished. In no acute distress. HEENT: Normocephalic and atraumatic. No JVD present. Carotid bruit is not present. No mass and no thyromegaly present.  No lymphadenopathy present. Cardiovascular: Normal rate, regular rhythm, normal heart sounds and intact distal pulses. Exam reveals no gallop and no friction rub. A I/VI systolic murmur was heard at the apex of the heart without significant radiation. Pulmonary/Chest: Effort normal and breath sounds normal. No respiratory distress. She has no wheezes, rhonchi or rales. Abdominal: Soft, non-tender. Bowel sounds and aorta are normal. She exhibits no organomegaly, mass or bruit. Extremities: Trace edema, cyanosis, or clubbing. Pulses are 2+ radial right radial  2+carotid. Trace pulses in dorsalis pedis and posterior tibial bilaterally. Neurological: She is alert and oriented to person, place, and time. No evidence of gross cranial nerve deficit. Coordination appeared normal.   Skin: Skin is warm and dry. There is no rash or diaphoresis. Psychiatric: She has a normal mood and affect. Her speech is normal and behavior is normal.      MOST RECENT LABS ON RECORD:   Lab Results   Component Value Date    WBC 5.4 05/09/2018    HGB 13.5 05/09/2018    HCT 40.3 05/09/2018     05/09/2018    CHOL 158 08/11/2015    TRIG 88 08/11/2015    HDL 64 05/09/2017    LDLCHOLESTEROL 108 05/09/2017    ALT 21 05/09/2018    AST 20 05/09/2018     05/09/2018    K 4.2 05/09/2018     05/09/2018    CREATININE 0.46 (L) 05/09/2018    BUN 13 05/09/2018    CO2 24 05/09/2018    TSH 3.09 05/09/2018    INR 1.0 06/04/2012        ASSESSMENT:  Encounter Diagnoses   Name Primary?  ASHD (arteriosclerotic heart disease) Yes    Hyperlipidemia with target LDL less than 70     Angina, class II (HCC)     KYLE (obstructive sleep apnea)       PLAN:  Abnormal Stress Test: Largely normal imaging but with transient ischemic dilatation of the left ventricle which has been associated with severe three vessel coronary artery disease with persistent symptoms of fatigue and intermittent chest pressure.   · Heart catheterization with coronary angiography: For these would be happy to see her sooner should the need arise. Once again, thank you for allowing me to participate in this patients care. Please do not hesitate to contact me could I be of further assistance. Sincerely,  Elva Mijares. Bud BECKMAN, MS, F.A.C.C. [facility] Cardiology Specialist, [address]  Phone: [phone], Fax: [phone]     I believe that the risk of significant morbidity and mortality related to the patient's current medical conditions are: intermediate-high. The documentation recorded by the scribe, accurately and completely reflects the services I personally performed and the decisions made by me. Snow Arthur.  Tay Yu MD, MS, F.A.C.C. 8/6/2018

## 2018-08-07 ENCOUNTER — TELEPHONE (OUTPATIENT)
Dept: CARDIOLOGY | Age: 68
End: 2018-08-07

## 2018-08-09 ENCOUNTER — HOSPITAL ENCOUNTER (OUTPATIENT)
Dept: CARDIAC CATH/INVASIVE PROCEDURES | Age: 68
Discharge: HOME | End: 2018-08-09
Payer: MEDICARE

## 2018-08-09 VITALS
HEART RATE: 82 BPM | BODY MASS INDEX: 25.16 KG/M2 | TEMPERATURE: 98.1 F | HEIGHT: 63 IN | SYSTOLIC BLOOD PRESSURE: 119 MMHG | WEIGHT: 142 LBS | OXYGEN SATURATION: 95 % | DIASTOLIC BLOOD PRESSURE: 71 MMHG | RESPIRATION RATE: 18 BRPM

## 2018-08-09 DIAGNOSIS — I20.9 ANGINA, CLASS II (HCC): ICD-10-CM

## 2018-08-09 DIAGNOSIS — R94.39 ABNORMAL CARDIOVASCULAR STRESS TEST: ICD-10-CM

## 2018-08-09 DIAGNOSIS — I25.10 ASHD (ARTERIOSCLEROTIC HEART DISEASE): ICD-10-CM

## 2018-08-09 DIAGNOSIS — E78.5 HYPERLIPIDEMIA WITH TARGET LDL LESS THAN 70: Chronic | ICD-10-CM

## 2018-08-09 DIAGNOSIS — G47.33 OSA (OBSTRUCTIVE SLEEP APNEA): ICD-10-CM

## 2018-08-09 PROCEDURE — C1887 CATHETER, GUIDING: HCPCS

## 2018-08-09 PROCEDURE — 93458 L HRT ARTERY/VENTRICLE ANGIO: CPT | Performed by: FAMILY MEDICINE

## 2018-08-09 PROCEDURE — 6370000000 HC RX 637 (ALT 250 FOR IP): Performed by: FAMILY MEDICINE

## 2018-08-09 PROCEDURE — C1769 GUIDE WIRE: HCPCS

## 2018-08-09 PROCEDURE — C1894 INTRO/SHEATH, NON-LASER: HCPCS

## 2018-08-09 PROCEDURE — 2500000003 HC RX 250 WO HCPCS

## 2018-08-09 PROCEDURE — 6360000002 HC RX W HCPCS

## 2018-08-09 RX ORDER — SODIUM CHLORIDE 0.9 % (FLUSH) 0.9 %
10 SYRINGE (ML) INJECTION EVERY 12 HOURS SCHEDULED
Status: DISCONTINUED | OUTPATIENT
Start: 2018-08-09 | End: 2018-08-10 | Stop reason: HOSPADM

## 2018-08-09 RX ORDER — NITROGLYCERIN 0.4 MG/1
0.4 TABLET SUBLINGUAL EVERY 5 MIN PRN
Status: DISCONTINUED | OUTPATIENT
Start: 2018-08-09 | End: 2018-08-10 | Stop reason: HOSPADM

## 2018-08-09 RX ORDER — FAMOTIDINE 20 MG/1
40 TABLET, FILM COATED ORAL ONCE
Status: COMPLETED | OUTPATIENT
Start: 2018-08-09 | End: 2018-08-09

## 2018-08-09 RX ORDER — DIPHENHYDRAMINE HCL 25 MG
50 TABLET ORAL ONCE
Status: DISCONTINUED | OUTPATIENT
Start: 2018-08-09 | End: 2018-08-09

## 2018-08-09 RX ORDER — SODIUM CHLORIDE 0.9 % (FLUSH) 0.9 %
10 SYRINGE (ML) INJECTION PRN
Status: DISCONTINUED | OUTPATIENT
Start: 2018-08-09 | End: 2018-08-10 | Stop reason: HOSPADM

## 2018-08-09 RX ORDER — ACETAMINOPHEN 325 MG/1
650 TABLET ORAL EVERY 4 HOURS PRN
Status: DISCONTINUED | OUTPATIENT
Start: 2018-08-09 | End: 2018-08-10 | Stop reason: HOSPADM

## 2018-08-09 RX ORDER — SODIUM CHLORIDE 9 MG/ML
INJECTION, SOLUTION INTRAVENOUS CONTINUOUS
Status: DISCONTINUED | OUTPATIENT
Start: 2018-08-09 | End: 2018-08-10 | Stop reason: HOSPADM

## 2018-08-09 RX ADMIN — FAMOTIDINE 40 MG: 20 TABLET ORAL at 10:34

## 2018-08-09 NOTE — OP NOTE
Coronary Angiography Brief Post Operative Note:    No significant coronary artery disease. Widely patent RCA stent. However, the patient did have pretty severe ST depression on left main engagement with moderate chest pressure that resolved on disengagement. Continue standard risk factor modification as clinically indicated and consider alternative etiologies of the patients symptoms. See report for further details.

## 2018-08-09 NOTE — H&P
Patient examined the patient and have reviewed H&P the from 8/6/18 and I agree with the current assessment and plan with no significant change in the patients condition.

## 2018-08-10 ENCOUNTER — TELEPHONE (OUTPATIENT)
Dept: CARDIOLOGY | Age: 68
End: 2018-08-10

## 2018-08-10 NOTE — TELEPHONE ENCOUNTER
Informed pt Dr. Yazan Hampton will be calling to address fatigue. Pt states Dr. Yazan Hampton called and advised nothing else could be done, suggested patient start exercise plan.

## 2018-10-03 ENCOUNTER — HOSPITAL ENCOUNTER (OUTPATIENT)
Age: 68
Discharge: HOME | End: 2018-10-05
Payer: MEDICARE

## 2018-10-03 ENCOUNTER — HOSPITAL ENCOUNTER (OUTPATIENT)
Dept: GENERAL RADIOLOGY | Age: 68
Discharge: HOME | End: 2018-10-05
Payer: MEDICARE

## 2018-10-03 DIAGNOSIS — R06.02 SOB (SHORTNESS OF BREATH): ICD-10-CM

## 2018-10-03 PROCEDURE — 71046 X-RAY EXAM CHEST 2 VIEWS: CPT

## 2018-10-04 ENCOUNTER — HOSPITAL ENCOUNTER (OUTPATIENT)
Dept: PULMONOLOGY | Age: 68
Discharge: HOME | End: 2018-10-04
Payer: MEDICARE

## 2018-10-04 DIAGNOSIS — R06.02 SOB (SHORTNESS OF BREATH): ICD-10-CM

## 2018-10-04 PROCEDURE — 94664 DEMO&/EVAL PT USE INHALER: CPT

## 2018-10-04 PROCEDURE — 6360000002 HC RX W HCPCS: Performed by: INTERNAL MEDICINE

## 2018-10-04 PROCEDURE — 94729 DIFFUSING CAPACITY: CPT

## 2018-10-04 PROCEDURE — 94060 EVALUATION OF WHEEZING: CPT

## 2018-10-04 PROCEDURE — 94726 PLETHYSMOGRAPHY LUNG VOLUMES: CPT

## 2018-10-04 RX ORDER — ALBUTEROL SULFATE 2.5 MG/3ML
2.5 SOLUTION RESPIRATORY (INHALATION) ONCE
Status: COMPLETED | OUTPATIENT
Start: 2018-10-04 | End: 2018-10-04

## 2018-10-04 RX ADMIN — ALBUTEROL SULFATE 2.5 MG: 2.5 SOLUTION RESPIRATORY (INHALATION) at 13:51

## 2018-10-29 ENCOUNTER — HOSPITAL ENCOUNTER (OUTPATIENT)
Dept: CT IMAGING | Age: 68
Discharge: HOME | End: 2018-10-31
Payer: MEDICARE

## 2018-10-29 DIAGNOSIS — R06.02 SOB (SHORTNESS OF BREATH): ICD-10-CM

## 2018-10-29 PROCEDURE — 71250 CT THORAX DX C-: CPT

## 2018-11-12 ENCOUNTER — OFFICE VISIT (OUTPATIENT)
Dept: CARDIOLOGY | Age: 68
End: 2018-11-12
Payer: MEDICARE

## 2018-11-12 VITALS
SYSTOLIC BLOOD PRESSURE: 125 MMHG | DIASTOLIC BLOOD PRESSURE: 84 MMHG | RESPIRATION RATE: 18 BRPM | BODY MASS INDEX: 25.55 KG/M2 | HEART RATE: 70 BPM | WEIGHT: 144.2 LBS | HEIGHT: 63 IN

## 2018-11-12 DIAGNOSIS — I35.1 MILD AORTIC REGURGITATION: ICD-10-CM

## 2018-11-12 DIAGNOSIS — I20.9 ANGINA, CLASS II (HCC): ICD-10-CM

## 2018-11-12 DIAGNOSIS — E78.5 HYPERLIPIDEMIA WITH TARGET LDL LESS THAN 70: Chronic | ICD-10-CM

## 2018-11-12 DIAGNOSIS — I25.10 ASHD (ARTERIOSCLEROTIC HEART DISEASE): Primary | Chronic | ICD-10-CM

## 2018-11-12 DIAGNOSIS — I20.1 CORONARY VASOSPASM (HCC): ICD-10-CM

## 2018-11-12 PROCEDURE — 3017F COLORECTAL CA SCREEN DOC REV: CPT | Performed by: FAMILY MEDICINE

## 2018-11-12 PROCEDURE — 99214 OFFICE O/P EST MOD 30 MIN: CPT | Performed by: FAMILY MEDICINE

## 2018-11-12 PROCEDURE — 1123F ACP DISCUSS/DSCN MKR DOCD: CPT | Performed by: FAMILY MEDICINE

## 2018-11-12 PROCEDURE — 1036F TOBACCO NON-USER: CPT | Performed by: FAMILY MEDICINE

## 2018-11-12 PROCEDURE — 1101F PT FALLS ASSESS-DOCD LE1/YR: CPT | Performed by: FAMILY MEDICINE

## 2018-11-12 PROCEDURE — G8427 DOCREV CUR MEDS BY ELIG CLIN: HCPCS | Performed by: FAMILY MEDICINE

## 2018-11-12 PROCEDURE — G8484 FLU IMMUNIZE NO ADMIN: HCPCS | Performed by: FAMILY MEDICINE

## 2018-11-12 PROCEDURE — G8598 ASA/ANTIPLAT THER USED: HCPCS | Performed by: FAMILY MEDICINE

## 2018-11-12 PROCEDURE — G8400 PT W/DXA NO RESULTS DOC: HCPCS | Performed by: FAMILY MEDICINE

## 2018-11-12 PROCEDURE — 4040F PNEUMOC VAC/ADMIN/RCVD: CPT | Performed by: FAMILY MEDICINE

## 2018-11-12 PROCEDURE — G8419 CALC BMI OUT NRM PARAM NOF/U: HCPCS | Performed by: FAMILY MEDICINE

## 2018-11-12 PROCEDURE — 1090F PRES/ABSN URINE INCON ASSESS: CPT | Performed by: FAMILY MEDICINE

## 2018-11-12 RX ORDER — PANTOPRAZOLE SODIUM 20 MG/1
TABLET, DELAYED RELEASE ORAL
Qty: 90 TABLET | Refills: 3 | Status: SHIPPED | OUTPATIENT
Start: 2018-11-12 | End: 2019-09-12 | Stop reason: SDUPTHER

## 2018-11-12 NOTE — PROGRESS NOTES
Mann Hu am scribing for and in the presence of Dawna Holbrook MD.    Patient: Tavia Day  : 1950  Date of Visit: 2018    REASON FOR VISIT / CONSULTATION: Follow up for CAD, Angina    Dear Dr. Hill Padilla,    I had the pleasure of seeing your patient Tavia Day in followup today. As you know, Ms. Marquez is a 76 y.o. female with a history of coronary artery disease with a total of 4 stents, most recently in 2012. In 10/12 she had a repeat heart catheterization with a bit of a complication including severe chest pain after a left ventriculogram but this resolved. Ms. Ene Duran underwent a repeat stress test in  which was abnormal and on her heart catheterization done 2018 she developed chest pressure as well as ST changes on left main engagement suspicious for coronary vasospasm. Ms. Ene Duran reports doing ok though she does report continuing to have fatigue as well as intermittent chest discomfort. Ms. Ene Duran states she still has chest discomfort for seconds when she goes from a sitting position to a standing position and notices it about once a week and although is mild, does scare her at times. She denied any current or recent chest pain,abdominal pain, bleeding problems, problems with her medications or any other concerns at this time. Past Medical History:   Diagnosis Date    CAD (coronary artery disease) ,     4 total stents. 2 Stents in RCA, 1 in small OM1.  H/O cardiovascular stress test 13, 14    Relatively normal without evidence of significant ischemia or infarction. global LV systolic function was normal w/o regional wall motion abnormalities. No significant EKG evidence of ischemia during monitoring w/o significant associated arrhythmias.  Tay score is 5.     H/O cardiovascular stress test 2017    Larglely normal myocardial perfusion imaging with soft tissue artifact but w/o evidence of significant myocardial ischemia or infarction. results are most consistent with low/intermediate risk for significant CAD    H/O echocardiogram 5/21/13, 6/24/14    EF>55%, mild concentric LVH    Hx of cardiac catheterization 08/09/2018    MIld CAD w/o any significant focal stenosis with a normal LV end diastolic pressure LVEDP interestingly the pt developed pretty pronounced ST depression on Lt main engagment with mod chest pressure which resolved o disengagement of the LT main    Hx of heart artery stent 10/06/2011    Lesion of Prox RCA 75% stenosis-Drug Eluting stent to RCA    Hx of percutaneous left heart catheterization 04/07/2011    LMCA, LAD & RAMUS- normal, LCX-lesion on Mid CX 40% stenosis, RCA-Patent stent, EF 60%    Hx of percutaneous left heart catheterization 04/11/2012    Normal renals, Patent RCA stents with mid LCX stenosis IVUS suggest 60-70% stenosis and due to continues ischemia, a LISA was inserted    Hx of percutaneous left heart catheterization 09/27/2012    LMCA & LAD -Normal, LCXMild luminal irregularities, Patent prior stent, RCA-Patent stents in Prox and mid segment    Hyperlipidemia     Normal nuclear stress test 06/06/2012       CURRENT ALLERGIES: Benadryl [diphenhydramine]; Iv dye [iodides]; and Pcn [penicillins] REVIEW OF SYSTEMS: 14 systems were reviewed. Pertinent positives and negatives as above, all else negative. Past Surgical History:   Procedure Laterality Date    CARDIAC CATHETERIZATION Left 08/09/2018    right radial/ [employer] [place]/ Dr Kim Samaniego significant coronary artery disease. Widely patent RCA stent.  However, the patient did have pretty severe ST depression on left main engagement with moderate chest pressure that resolved on disengagement   [address]      disc replaced in neck    Social History:  Social History   Substance Use Topics    Smoking status: Former Smoker     Packs/day: 2.00     Years: 20.00 maximally at the 2nd right intercostal space. Pulmonary/Chest: Effort normal and breath sounds normal. No respiratory distress. She has no wheezes, rhonchi or rales. Abdominal: Soft, non-tender. Bowel sounds and aorta are normal. She exhibits no organomegaly, mass or bruit. Extremities: No edema. No cyanosis and no clubbing. Pulses are 2+ radial and carotid pulses. 2+ dorsalis pedis and posterior tibial pulses bilaterally. Neurological: She is alert and oriented to person, place, and time. No evidence of gross cranial nerve deficit. Coordination appeared normal.   Skin: Skin is warm and dry. There is no rash or diaphoresis. Psychiatric: She has a normal mood and affect. Her speech is normal and behavior is normal.      MOST RECENT LABS ON RECORD:   Lab Results   Component Value Date    WBC 6.8 08/06/2018    HGB 13.8 08/06/2018    HCT 41.3 08/06/2018     08/06/2018    CHOL 158 08/11/2015    TRIG 88 08/11/2015    HDL 64 05/09/2017    LDLCHOLESTEROL 108 05/09/2017    ALT 21 05/09/2018    AST 20 05/09/2018     08/06/2018    K 4.1 08/06/2018    CL 99 08/06/2018    CREATININE 0.51 08/06/2018    BUN 11 08/06/2018    CO2 29 08/06/2018    TSH 3.09 05/09/2018    INR 1.0 06/04/2012        ASSESSMENT:  Encounter Diagnoses   Name Primary?  Coronary vasospasm (HCC)     ASHD (arteriosclerotic heart disease) Yes    Hyperlipidemia with target LDL less than 70     Angina, class II ([facility] [address]       PLAN:  · Coronary Vasospasm: Evidence on Heart Catheterization 8/2018. · Continue Amlodipine 2.5 mg daily. · Additional Testing: I Ordered an Echocardiogram to assess Ms. Marquez's ejection fraction and to look for significant valvular heart disease as a source of Ms. Marquez symptoms    · Atherosclerotic Heart Disease: Hx: Stents in 2012  Antiplatelet Agent: Continue Aspirin 81 mg daily. Beta blocker: unable to tolerate beta-blocker. Calcium Channel Blocker: Continue amlodipine 2.5 mg Ms. Marquez reports she

## 2018-11-27 ENCOUNTER — HOSPITAL ENCOUNTER (OUTPATIENT)
Dept: NON INVASIVE DIAGNOSTICS | Age: 68
Discharge: HOME | End: 2018-11-27
Payer: MEDICARE

## 2018-11-27 DIAGNOSIS — E78.5 HYPERLIPIDEMIA WITH TARGET LDL LESS THAN 70: Chronic | ICD-10-CM

## 2018-11-27 DIAGNOSIS — I20.9 ANGINA, CLASS II (HCC): ICD-10-CM

## 2018-11-27 DIAGNOSIS — I20.1 CORONARY VASOSPASM (HCC): ICD-10-CM

## 2018-11-27 DIAGNOSIS — I25.10 ASHD (ARTERIOSCLEROTIC HEART DISEASE): Chronic | ICD-10-CM

## 2018-11-27 DIAGNOSIS — I35.1 MILD AORTIC REGURGITATION: ICD-10-CM

## 2018-11-27 LAB
LV EF: 60 %
LVEF MODALITY: NORMAL

## 2018-11-27 PROCEDURE — 93306 TTE W/DOPPLER COMPLETE: CPT

## 2018-11-28 ENCOUNTER — TELEPHONE (OUTPATIENT)
Dept: CARDIOLOGY | Age: 68
End: 2018-11-28

## 2018-12-10 ENCOUNTER — HOSPITAL ENCOUNTER (OUTPATIENT)
Age: 68
Discharge: HOME | End: 2018-12-10
Payer: MEDICARE

## 2018-12-10 LAB
CREAT SERPL-MCNC: 0.51 MG/DL (ref 0.5–0.9)
GFR AFRICAN AMERICAN: >60 ML/MIN
GFR NON-AFRICAN AMERICAN: >60 ML/MIN
GFR SERPL CREATININE-BSD FRML MDRD: NORMAL ML/MIN/{1.73_M2}
GFR SERPL CREATININE-BSD FRML MDRD: NORMAL ML/MIN/{1.73_M2}
POTASSIUM SERPL-SCNC: 4.6 MMOL/L (ref 3.7–5.3)

## 2018-12-10 PROCEDURE — 36415 COLL VENOUS BLD VENIPUNCTURE: CPT

## 2018-12-10 PROCEDURE — 84132 ASSAY OF SERUM POTASSIUM: CPT

## 2018-12-10 PROCEDURE — 82565 ASSAY OF CREATININE: CPT

## 2019-02-06 ENCOUNTER — HOSPITAL ENCOUNTER (OUTPATIENT)
Dept: GENERAL RADIOLOGY | Age: 69
Discharge: HOME | End: 2019-02-08
Payer: MEDICARE

## 2019-02-06 ENCOUNTER — HOSPITAL ENCOUNTER (OUTPATIENT)
Age: 69
Discharge: HOME | End: 2019-02-08
Payer: MEDICARE

## 2019-02-06 DIAGNOSIS — R05.9 COUGH: ICD-10-CM

## 2019-02-06 PROCEDURE — 71046 X-RAY EXAM CHEST 2 VIEWS: CPT

## 2019-02-08 ENCOUNTER — HOSPITAL ENCOUNTER (OUTPATIENT)
Age: 69
Discharge: HOME | End: 2019-02-08
Payer: MEDICARE

## 2019-02-08 DIAGNOSIS — E78.5 HYPERLIPIDEMIA WITH TARGET LDL LESS THAN 70: Chronic | ICD-10-CM

## 2019-02-08 LAB
CHOLESTEROL, FASTING: 170 MG/DL
CHOLESTEROL/HDL RATIO: 2.3
HDLC SERPL-MCNC: 74 MG/DL
LDL CHOLESTEROL: 82 MG/DL (ref 0–130)
TRIGLYCERIDE, FASTING: 69 MG/DL
VLDLC SERPL CALC-MCNC: NORMAL MG/DL (ref 1–30)

## 2019-02-08 PROCEDURE — 36415 COLL VENOUS BLD VENIPUNCTURE: CPT

## 2019-02-08 PROCEDURE — 80061 LIPID PANEL: CPT

## 2019-04-27 RX ORDER — AMLODIPINE BESYLATE 5 MG/1
2.5 TABLET ORAL DAILY
Qty: 45 TABLET | Refills: 3 | Status: SHIPPED | OUTPATIENT
Start: 2019-04-27 | End: 2020-05-19

## 2019-05-17 ENCOUNTER — OFFICE VISIT (OUTPATIENT)
Dept: CARDIOLOGY | Age: 69
End: 2019-05-17
Payer: MEDICARE

## 2019-05-17 VITALS
OXYGEN SATURATION: 99 % | HEART RATE: 77 BPM | BODY MASS INDEX: 25.37 KG/M2 | WEIGHT: 143.2 LBS | DIASTOLIC BLOOD PRESSURE: 80 MMHG | SYSTOLIC BLOOD PRESSURE: 120 MMHG

## 2019-05-17 DIAGNOSIS — I35.1 MODERATE AORTIC REGURGITATION: ICD-10-CM

## 2019-05-17 DIAGNOSIS — I25.10 ASHD (ARTERIOSCLEROTIC HEART DISEASE): ICD-10-CM

## 2019-05-17 DIAGNOSIS — I20.9 ANGINA, CLASS II (HCC): ICD-10-CM

## 2019-05-17 DIAGNOSIS — E78.2 MIXED HYPERLIPIDEMIA: ICD-10-CM

## 2019-05-17 DIAGNOSIS — I20.1 CORONARY VASOSPASM (HCC): Primary | ICD-10-CM

## 2019-05-17 DIAGNOSIS — R06.02 SOB (SHORTNESS OF BREATH): ICD-10-CM

## 2019-05-17 DIAGNOSIS — J44.9 MODERATE COPD (CHRONIC OBSTRUCTIVE PULMONARY DISEASE) (HCC): ICD-10-CM

## 2019-05-17 PROCEDURE — G8926 SPIRO NO PERF OR DOC: HCPCS | Performed by: FAMILY MEDICINE

## 2019-05-17 PROCEDURE — G8427 DOCREV CUR MEDS BY ELIG CLIN: HCPCS | Performed by: FAMILY MEDICINE

## 2019-05-17 PROCEDURE — G8419 CALC BMI OUT NRM PARAM NOF/U: HCPCS | Performed by: FAMILY MEDICINE

## 2019-05-17 PROCEDURE — 3023F SPIROM DOC REV: CPT | Performed by: FAMILY MEDICINE

## 2019-05-17 PROCEDURE — 1123F ACP DISCUSS/DSCN MKR DOCD: CPT | Performed by: FAMILY MEDICINE

## 2019-05-17 PROCEDURE — 4040F PNEUMOC VAC/ADMIN/RCVD: CPT | Performed by: FAMILY MEDICINE

## 2019-05-17 PROCEDURE — G8400 PT W/DXA NO RESULTS DOC: HCPCS | Performed by: FAMILY MEDICINE

## 2019-05-17 PROCEDURE — 3017F COLORECTAL CA SCREEN DOC REV: CPT | Performed by: FAMILY MEDICINE

## 2019-05-17 PROCEDURE — 99214 OFFICE O/P EST MOD 30 MIN: CPT | Performed by: FAMILY MEDICINE

## 2019-05-17 PROCEDURE — G8598 ASA/ANTIPLAT THER USED: HCPCS | Performed by: FAMILY MEDICINE

## 2019-05-17 PROCEDURE — 1036F TOBACCO NON-USER: CPT | Performed by: FAMILY MEDICINE

## 2019-05-17 PROCEDURE — 1090F PRES/ABSN URINE INCON ASSESS: CPT | Performed by: FAMILY MEDICINE

## 2019-05-17 NOTE — PROGRESS NOTES
Courtney Godfrey am scribing for and in the presence of Andrea Naranjo MD.    Patient: Susan Foy  : 1950  Date of Visit: May 17, 2019    REASON FOR VISIT / CONSULTATION: Follow up for CAD, Angina    Dear Dr. Saniya Mahoney,    I had the pleasure of seeing your patient Susan Foy in followup today. As you know, Ms. Marquez is a 76 y.o. female with a history of coronary artery disease with a total of 4 stents, most recently in 2012. In 10/12 she had a repeat heart catheterization with a bit of a complication including severe chest pain after a left ventriculogram but this resolved. Ms. Deanna Weaver underwent a repeat stress test in  which was abnormal and on her heart catheterization done 2018 she developed chest pressure as well as ST changes on left main engagement suspicious for coronary vasospasm. Her recent echo done 2018 shows moderate aortic regurgitation. Ms. Deanna Weaver is here for follow up. She reports her Blood Pressure is all over the place ranges from 160's to 115 she was taking Amlodipine 4 times a week and then increased it to daily because it was too high 197/106 per Dr Saniya Mahoney. She also reports being more out of breath lately and has beenusing her inhalers much more frequently, often times multiple times a day and every couple of hours. She denied any current or recent chest pain,abdominal pain, bleeding problems, problems with her medications or any other concerns at this time. Past Medical History:   Diagnosis Date    CAD (coronary artery disease) ,     4 total stents. 2 Stents in RCA, 1 in small OM1.  H/O cardiovascular stress test 13, 14    Relatively normal without evidence of significant ischemia or infarction. global LV systolic function was normal w/o regional wall motion abnormalities. No significant EKG evidence of ischemia during monitoring w/o significant associated arrhythmias.  Tay score is 5.     H/O cardiovascular stress test 12/28/2017    Larglely normal myocardial perfusion imaging with soft tissue artifact but w/o evidence of significant myocardial ischemia or infarction. results are most consistent with low/intermediate risk for significant CAD    H/O echocardiogram 5/21/13, 6/24/14    EF>55%, mild concentric LVH    History of echocardiogram 11/27/2018    EF of 60%. evidence of moderate diastolic dysfunction is seen.  Hx of cardiac catheterization 08/09/2018    MIld CAD w/o any significant focal stenosis with a normal LV end diastolic pressure LVEDP interestingly the pt developed pretty pronounced ST depression on Lt main engagment with mod chest pressure which resolved o disengagement of the LT main    Hx of heart artery stent 10/06/2011    Lesion of Prox RCA 75% stenosis-Drug Eluting stent to RCA    Hx of percutaneous left heart catheterization 04/07/2011    LMCA, LAD & RAMUS- normal, LCX-lesion on Mid CX 40% stenosis, RCA-Patent stent, EF 60%    Hx of percutaneous left heart catheterization 04/11/2012    Normal renals, Patent RCA stents with mid LCX stenosis IVUS suggest 60-70% stenosis and due to continues ischemia, a LISA was inserted    Hx of percutaneous left heart catheterization 09/27/2012    LMCA & LAD -Normal, LCXMild luminal irregularities, Patent prior stent, RCA-Patent stents in Prox and mid segment    Hyperlipidemia     Normal nuclear stress test 06/06/2012       CURRENT ALLERGIES: Benadryl [diphenhydramine]; Iv dye [iodides]; and Pcn [penicillins] REVIEW OF SYSTEMS: 14 systems were reviewed. Pertinent positives and negatives as above, all else negative. Past Surgical History:   Procedure Laterality Date    CARDIAC CATHETERIZATION Left 08/09/2018    right radial/ [facility]/ Dr Juana Guevara significant coronary artery disease. Widely patent RCA stent.  However, the patient did have pretty severe ST depression on left main engagement with moderate chest pressure that resolved on disengagement    CARPAL TUNNEL RELEASE      CORONARY ANGIOPLASTY WITH STENT PLACEMENT      NECK SURGERY      disc replaced in neck    Social History:  Social History     Tobacco Use    Smoking status: Former Smoker     Packs/day: 2.00     Years: 20.00     Pack years: 40.00     Types: Cigarettes     Last attempt to quit: 2000     Years since quittin.0    Smokeless tobacco: Never Used   Substance Use Topics    Alcohol use: Yes     Comment: social    Drug use: No        CURRENT MEDICATIONS:  Outpatient Medications Marked as Taking for the 19 encounter (Office Visit) with Kwesi Guardado MD   Medication Sig Dispense Refill    timolol (BETIMOL) 0.5 % ophthalmic solution Place 1 drop into the right eye 2 times daily      amLODIPine (NORVASC) 5 MG tablet Take 0.5 tablets by mouth daily 45 tablet 3    rOPINIRole (REQUIP) 0.5 MG tablet TAKE 2 TABLETS BY MOUTH  DAILY 180 tablet 3    atorvastatin (LIPITOR) 40 MG tablet TAKE 1 TABLET BY MOUTH  DAILY 90 tablet 3    nitroGLYCERIN (NITROSTAT) 0.4 MG SL tablet Place 1 tablet under the tongue every 5 minutes as needed (as needed) 90 tablet 3    pantoprazole (PROTONIX) 20 MG tablet TAKE 1 TABLET BY MOUTH  DAILY 90 tablet 3    budesonide-formoterol (SYMBICORT) 160-4.5 MCG/ACT AERO Inhale 2 puffs into the lungs 2 times daily as needed (SOB) 3 Inhaler 3    albuterol sulfate  (90 Base) MCG/ACT inhaler Inhale 2 puffs into the lungs every 6 hours as needed for Wheezing 3 Inhaler 3    RESTASIS 0.05 % ophthalmic emulsion Place 1 drop into both eyes 2 times daily       latanoprost (XALATAN) 0.005 % ophthalmic solution Place 1 drop into both eyes nightly       aspirin 81 MG tablet Take 81 mg by mouth daily. FAMILY HISTORY: family history includes Cancer in her brother and mother.      PHYSICAL EXAM:   /80 (Site: Left Upper Arm, Position: Sitting, Cuff Size: Medium Adult)   Pulse 77   Wt 143 lb 3.2 oz (65 kg)   SpO2 99%   BMI 25.37 kg/m²  Body mass index is 25.37 kg/m². Constitutional: She is oriented to person, place, and time. She appears well-developed and well-nourished. In no acute distress. HEENT: Normocephalic and atraumatic. No JVD present. Carotid bruit is not present. No mass and no thyromegaly present. No lymphadenopathy present. Cardiovascular: Normal rate, regular rhythm, normal heart sounds. Exam reveals no gallop and no friction rubs. A II/VI systolic was heard maximally at the 4th 2nd right intercostal space. Pulmonary/Chest: Effort normal and breath sounds normal. No respiratory distress. She has no wheezes, rhonchi or rales. Abdominal: Soft, non-tender. Bowel sounds and aorta are normal. She exhibits no organomegaly, mass or bruit. Extremities: No edema. No cyanosis and no clubbing. Pulses are 2+ radial and carotid pulses. 2+ dorsalis pedis and posterior tibial pulses bilaterally. Neurological: She is alert and oriented to person, place, and time. No evidence of gross cranial nerve deficit. Coordination appeared normal.   Skin: Skin is warm and dry. There is no rash or diaphoresis. Psychiatric: She has a normal mood and affect. Her speech is normal and behavior is normal.      MOST RECENT LABS ON RECORD:   Lab Results   Component Value Date    WBC 6.8 08/06/2018    HGB 13.8 08/06/2018    HCT 41.3 08/06/2018     08/06/2018    CHOL 158 08/11/2015    TRIG 88 08/11/2015    HDL 74 02/08/2019    LDLCHOLESTEROL 82 02/08/2019    ALT 21 05/09/2018    AST 20 05/09/2018     08/06/2018    K 4.6 12/10/2018    CL 99 08/06/2018    CREATININE 0.51 12/10/2018    BUN 11 08/06/2018    CO2 29 08/06/2018    TSH 3.09 05/09/2018    INR 1.0 06/04/2012        ASSESSMENT:  Encounter Diagnoses   Name Primary?     Coronary vasospasm (HCC) Yes    ASHD (arteriosclerotic heart disease)     Mixed hyperlipidemia     Angina, class II (HCC)     Moderate aortic regurgitation     SOB (shortness of breath)     Moderate COPD (chronic obstructive pulmonary disease) ([facility]       PLAN:  · Coronary Vasospasm: Evidence on Heart Catheterization 8/2018. · Continue Amlodipine 2.5 mg daily. · Additional Testing: None    · Atherosclerotic Heart Disease: Hx: Stents in 2012  Antiplatelet Agent: Continue Aspirin 81 mg daily. Beta blocker: unable to tolerate beta-blocker. Calcium Channel Blocker: Continue amlodipine 2.5 mg  · Statin Therapy: Continue atorvastatin (Lipitor) 40 mg nightly. · Hyperlipidemia: Mixed LDL: 108 on 5/9/17  · Statin Therapy: Continue atorvastatin (Lipitor) 40 mg nightly. Angina: [place] Class II   · Beta Blocker Therapy: unable to tolerate beta blocker   · Calcium Channel Blocker: Continue Amlodipine   · Statin Therapy: Continue atorvastatin (Lipitor) 40 mg nightly. · Moderate aortic regurgitation shown on echocardiogram done 11/2018. · I will reorder echo to be done in the next 1-2 years. · Shortness of breath with mild exertion:   Additional Testing List: None    Follow up with Dr. Titus Celestin regarding possible change to her pulmonary medications as she is using her albuterol inhaler quite frequently. · History of Moderate COPD:  ·  PFT done 10/2018  · Continue current treatment as ordered. In the meantime, I encouraged her to continue all of her current medications and follow up with you as previously scheduled. FOLLOW UP:   I told Ms. Marquez to call my office if she had any problems, but otherwise told her to Return in about 6 months (around 11/17/2019). However, I would be happy to see her sooner should the need arise. Once again, thank you for allowing me to participate in this patients care. Please do not hesitate to contact me could I be of further assistance. Sincerely,  Cheri Alexander. Bud BECKMAN, MS, F.A.C.C.   [facility], [address] [facility], [facility], [address] [place] [address]  Phone: [phone], Fax: [phone]     I believe that the risk of significant morbidity and mortality related to the patient's current medical conditions are: Intermediate. The documentation recorded by the scribe, accurately and completely reflects the services I personally performed and the decisions made by me. Sherrie Kuo MD, MS, F.A.C.C.  May 17, 2019

## 2019-05-17 NOTE — PATIENT INSTRUCTIONS
SURVEY:    You may be receiving a survey from [employer] regarding your visit today. Please complete the survey to enable us to provide the highest quality of care to you and your family. If you cannot score us a very good on any question, please call the office to discuss how we could have made your experience a very good one. Thank you.

## 2019-06-26 ENCOUNTER — HOSPITAL ENCOUNTER (OUTPATIENT)
Age: 69
Discharge: HOME | End: 2019-06-26
Payer: MEDICARE

## 2019-06-26 DIAGNOSIS — E78.5 HYPERLIPIDEMIA WITH TARGET LDL LESS THAN 70: ICD-10-CM

## 2019-06-26 DIAGNOSIS — R53.83 FATIGUE, UNSPECIFIED TYPE: ICD-10-CM

## 2019-06-26 LAB
-: ABNORMAL
ABSOLUTE EOS #: 0.17 K/UL (ref 0–0.44)
ABSOLUTE IMMATURE GRANULOCYTE: <0.03 K/UL (ref 0–0.3)
ABSOLUTE LYMPH #: 1.73 K/UL (ref 1.1–3.7)
ABSOLUTE MONO #: 0.6 K/UL (ref 0.1–1.2)
ALBUMIN SERPL-MCNC: 4.5 G/DL (ref 3.5–5.2)
ALBUMIN/GLOBULIN RATIO: 1.4 (ref 1–2.5)
ALP BLD-CCNC: 85 U/L (ref 35–104)
ALT SERPL-CCNC: 29 U/L (ref 5–33)
AMORPHOUS: ABNORMAL
ANION GAP SERPL CALCULATED.3IONS-SCNC: 12 MMOL/L (ref 9–17)
AST SERPL-CCNC: 26 U/L
BACTERIA: ABNORMAL
BASOPHILS # BLD: 1 % (ref 0–2)
BASOPHILS ABSOLUTE: 0.03 K/UL (ref 0–0.2)
BILIRUB SERPL-MCNC: 0.44 MG/DL (ref 0.3–1.2)
BILIRUBIN URINE: NEGATIVE
BUN BLDV-MCNC: 15 MG/DL (ref 8–23)
BUN/CREAT BLD: 29 (ref 9–20)
C-REACTIVE PROTEIN: 4.1 MG/L (ref 0–5)
CALCIUM SERPL-MCNC: 9.4 MG/DL (ref 8.6–10.4)
CASTS UA: ABNORMAL /LPF
CHLORIDE BLD-SCNC: 102 MMOL/L (ref 98–107)
CO2: 27 MMOL/L (ref 20–31)
COLOR: YELLOW
COMMENT UA: ABNORMAL
CREAT SERPL-MCNC: 0.51 MG/DL (ref 0.5–0.9)
CRYSTALS, UA: ABNORMAL /HPF
DIFFERENTIAL TYPE: NORMAL
EOSINOPHILS RELATIVE PERCENT: 3 % (ref 1–4)
EPITHELIAL CELLS UA: ABNORMAL /HPF (ref 0–25)
GFR AFRICAN AMERICAN: >60 ML/MIN
GFR NON-AFRICAN AMERICAN: >60 ML/MIN
GFR SERPL CREATININE-BSD FRML MDRD: ABNORMAL ML/MIN/{1.73_M2}
GFR SERPL CREATININE-BSD FRML MDRD: ABNORMAL ML/MIN/{1.73_M2}
GLUCOSE BLD-MCNC: 80 MG/DL (ref 70–99)
GLUCOSE URINE: NEGATIVE
HCT VFR BLD CALC: 41.3 % (ref 36.3–47.1)
HEMOGLOBIN: 13.3 G/DL (ref 11.9–15.1)
IMMATURE GRANULOCYTES: 0 %
KETONES, URINE: NEGATIVE
LEUKOCYTE ESTERASE, URINE: NEGATIVE
LYMPHOCYTES # BLD: 27 % (ref 24–43)
MCH RBC QN AUTO: 31.9 PG (ref 25.2–33.5)
MCHC RBC AUTO-ENTMCNC: 32.2 G/DL (ref 28.4–34.8)
MCV RBC AUTO: 99 FL (ref 82.6–102.9)
MONOCYTES # BLD: 9 % (ref 3–12)
MUCUS: ABNORMAL
NITRITE, URINE: NEGATIVE
NRBC AUTOMATED: 0 PER 100 WBC
OTHER OBSERVATIONS UA: ABNORMAL
PDW BLD-RTO: 12.6 % (ref 11.8–14.4)
PH UA: 6 (ref 5–9)
PLATELET # BLD: 282 K/UL (ref 138–453)
PLATELET ESTIMATE: NORMAL
PMV BLD AUTO: 9.2 FL (ref 8.1–13.5)
POTASSIUM SERPL-SCNC: 4.5 MMOL/L (ref 3.7–5.3)
PROTEIN UA: NEGATIVE
RBC # BLD: 4.17 M/UL (ref 3.95–5.11)
RBC # BLD: NORMAL 10*6/UL
RBC UA: ABNORMAL /HPF (ref 0–2)
RENAL EPITHELIAL, UA: ABNORMAL /HPF
SEDIMENTATION RATE, ERYTHROCYTE: 19 MM (ref 0–20)
SEG NEUTROPHILS: 60 % (ref 36–65)
SEGMENTED NEUTROPHILS ABSOLUTE COUNT: 3.82 K/UL (ref 1.5–8.1)
SODIUM BLD-SCNC: 141 MMOL/L (ref 135–144)
SPECIFIC GRAVITY UA: 1.01 (ref 1.01–1.02)
TOTAL PROTEIN: 7.8 G/DL (ref 6.4–8.3)
TRICHOMONAS: ABNORMAL
TSH SERPL DL<=0.05 MIU/L-ACNC: 3.36 MIU/L (ref 0.3–5)
TURBIDITY: CLEAR
URINE HGB: ABNORMAL
UROBILINOGEN, URINE: NORMAL
WBC # BLD: 6.4 K/UL (ref 3.5–11.3)
WBC # BLD: NORMAL 10*3/UL
WBC UA: ABNORMAL /HPF (ref 0–5)
YEAST: ABNORMAL

## 2019-06-26 PROCEDURE — 81001 URINALYSIS AUTO W/SCOPE: CPT

## 2019-06-26 PROCEDURE — 84443 ASSAY THYROID STIM HORMONE: CPT

## 2019-06-26 PROCEDURE — 80053 COMPREHEN METABOLIC PANEL: CPT

## 2019-06-26 PROCEDURE — 85025 COMPLETE CBC W/AUTO DIFF WBC: CPT

## 2019-06-26 PROCEDURE — 86140 C-REACTIVE PROTEIN: CPT

## 2019-06-26 PROCEDURE — 36415 COLL VENOUS BLD VENIPUNCTURE: CPT

## 2019-06-26 PROCEDURE — 85651 RBC SED RATE NONAUTOMATED: CPT

## 2019-07-03 ENCOUNTER — HOSPITAL ENCOUNTER (OUTPATIENT)
Dept: BONE DENSITY | Age: 69
Discharge: HOME | End: 2019-07-05
Payer: MEDICARE

## 2019-07-03 ENCOUNTER — HOSPITAL ENCOUNTER (OUTPATIENT)
Dept: MRI IMAGING | Age: 69
Discharge: HOME | End: 2019-07-05
Payer: MEDICARE

## 2019-07-03 DIAGNOSIS — M54.2 NECK PAIN OF OVER 3 MONTHS DURATION: ICD-10-CM

## 2019-07-03 DIAGNOSIS — Z78.0 POST-MENOPAUSAL: ICD-10-CM

## 2019-07-03 PROCEDURE — 77080 DXA BONE DENSITY AXIAL: CPT

## 2019-07-03 PROCEDURE — A9579 GAD-BASE MR CONTRAST NOS,1ML: HCPCS | Performed by: INTERNAL MEDICINE

## 2019-07-03 PROCEDURE — 6360000004 HC RX CONTRAST MEDICATION: Performed by: INTERNAL MEDICINE

## 2019-07-03 PROCEDURE — 72156 MRI NECK SPINE W/O & W/DYE: CPT

## 2019-07-03 RX ADMIN — GADOTERIDOL 13 ML: 279.3 INJECTION, SOLUTION INTRAVENOUS at 09:14

## 2019-07-14 ENCOUNTER — APPOINTMENT (OUTPATIENT)
Dept: CT IMAGING | Age: 69
End: 2019-07-14
Payer: MEDICARE

## 2019-07-14 ENCOUNTER — TELEPHONE (OUTPATIENT)
Dept: PRIMARY CARE CLINIC | Age: 69
End: 2019-07-14

## 2019-07-14 ENCOUNTER — HOSPITAL ENCOUNTER (EMERGENCY)
Age: 69
Discharge: HOME | End: 2019-07-14
Attending: EMERGENCY MEDICINE
Payer: MEDICARE

## 2019-07-14 VITALS
HEIGHT: 63 IN | OXYGEN SATURATION: 99 % | HEART RATE: 81 BPM | DIASTOLIC BLOOD PRESSURE: 72 MMHG | BODY MASS INDEX: 25.69 KG/M2 | SYSTOLIC BLOOD PRESSURE: 138 MMHG | WEIGHT: 145 LBS | RESPIRATION RATE: 14 BRPM | TEMPERATURE: 98.6 F

## 2019-07-14 DIAGNOSIS — R20.2 ARM PARESTHESIA, LEFT: Primary | ICD-10-CM

## 2019-07-14 LAB
ABSOLUTE EOS #: 0.1 K/UL (ref 0–0.4)
ABSOLUTE IMMATURE GRANULOCYTE: NORMAL K/UL (ref 0–0.3)
ABSOLUTE LYMPH #: 1.4 K/UL (ref 1–4.8)
ABSOLUTE MONO #: 0.6 K/UL (ref 0.1–1.2)
ANION GAP SERPL CALCULATED.3IONS-SCNC: 12 MMOL/L (ref 9–17)
BASOPHILS # BLD: 1 % (ref 0–1)
BASOPHILS ABSOLUTE: 0 K/UL (ref 0–0.2)
BUN BLDV-MCNC: 12 MG/DL (ref 8–23)
BUN/CREAT BLD: 20 (ref 9–20)
CALCIUM SERPL-MCNC: 9.8 MG/DL (ref 8.6–10.4)
CHLORIDE BLD-SCNC: 99 MMOL/L (ref 98–107)
CO2: 29 MMOL/L (ref 20–31)
CREAT SERPL-MCNC: 0.59 MG/DL (ref 0.5–0.9)
DIFFERENTIAL TYPE: NORMAL
EOSINOPHILS RELATIVE PERCENT: 2 % (ref 1–7)
GFR AFRICAN AMERICAN: >60 ML/MIN
GFR NON-AFRICAN AMERICAN: >60 ML/MIN
GFR SERPL CREATININE-BSD FRML MDRD: ABNORMAL ML/MIN/{1.73_M2}
GFR SERPL CREATININE-BSD FRML MDRD: ABNORMAL ML/MIN/{1.73_M2}
GLUCOSE BLD-MCNC: 125 MG/DL (ref 70–99)
HCT VFR BLD CALC: 43.2 % (ref 36–46)
HEMOGLOBIN: 14.4 G/DL (ref 12–16)
IMMATURE GRANULOCYTES: NORMAL %
INR BLD: 0.9
LYMPHOCYTES # BLD: 21 % (ref 16–46)
MCH RBC QN AUTO: 32.1 PG (ref 26–34)
MCHC RBC AUTO-ENTMCNC: 33.4 G/DL (ref 31–37)
MCV RBC AUTO: 96.1 FL (ref 80–100)
MONOCYTES # BLD: 9 % (ref 4–11)
NRBC AUTOMATED: NORMAL PER 100 WBC
PDW BLD-RTO: 13.2 % (ref 11–14.5)
PLATELET # BLD: 298 K/UL (ref 140–450)
PLATELET ESTIMATE: NORMAL
PMV BLD AUTO: 6.9 FL (ref 6–12)
POTASSIUM SERPL-SCNC: 4.8 MMOL/L (ref 3.7–5.3)
PROTHROMBIN TIME: 9.8 SEC (ref 9.4–11.3)
RBC # BLD: 4.49 M/UL (ref 4–5.2)
RBC # BLD: NORMAL 10*6/UL
SEG NEUTROPHILS: 67 % (ref 43–77)
SEGMENTED NEUTROPHILS ABSOLUTE COUNT: 4.7 K/UL (ref 1.8–7.7)
SODIUM BLD-SCNC: 140 MMOL/L (ref 135–144)
WBC # BLD: 6.9 K/UL (ref 3.5–11)
WBC # BLD: NORMAL 10*3/UL

## 2019-07-14 PROCEDURE — 80048 BASIC METABOLIC PNL TOTAL CA: CPT

## 2019-07-14 PROCEDURE — 85610 PROTHROMBIN TIME: CPT

## 2019-07-14 PROCEDURE — 93005 ELECTROCARDIOGRAM TRACING: CPT | Performed by: EMERGENCY MEDICINE

## 2019-07-14 PROCEDURE — 36415 COLL VENOUS BLD VENIPUNCTURE: CPT

## 2019-07-14 PROCEDURE — 85025 COMPLETE CBC W/AUTO DIFF WBC: CPT

## 2019-07-14 PROCEDURE — 70450 CT HEAD/BRAIN W/O DYE: CPT

## 2019-07-14 PROCEDURE — 99284 EMERGENCY DEPT VISIT MOD MDM: CPT

## 2019-07-14 NOTE — ED PROVIDER NOTES
smoking use included cigarettes. She has a 40.00 pack-year smoking history. She has never used smokeless tobacco. She reports that she drinks alcohol. She reports that she does not use drugs. PHYSICAL EXAM     INITIAL VITALS:  height is 5' 3\" (1.6 m) and weight is 145 lb (65.8 kg). Her tympanic temperature is 98.6 °F (37 °C). Her blood pressure is 151/93 (abnormal) and her pulse is 86. Her respiration is 14 and oxygen saturation is 98%. Constitutional: The patient is alert and well-developed, vital signs as noted. Psychiatric: Oriented to time, place and person, affect is appropriate for age. Eyes: Pupils equal and reactive to light. EOMI. Ears, nose, and throat: Oropharynx clear  Neck: No masses, trachea midline, and no thyromegaly. Respiratory: Clear to auscultation, full aeration throughout all fields. Cardiovascular: No murmurs, heart sounds normal, no thrills. Gastrointestinal: No masses, no hepatosplenomegaly, bowel sounds positive. No tenderness. Skin: No rashes or lesions on exposed surfaces, good skin turgor. Neurological: Deep tendon reflexes 2+, sensation intact bilaterally, no focal neurological findings. Specifically has good range of motion against gravity and the weakness is only subjective but cannot be demonstrated. No hand drift  Extremities: Good range of motion, no edema. DIFFERENTIAL DIAGNOSIS/ MEDICAL DECISION MAKING:     NIH stroke scale 0    She was observed in the emergency department for approximately 2 hours and there was no residual bleeding or weakness in her left arm and no other symptoms have occurred. Testing including CT scan is essentially negative and there is no evidence of CVA at this time    Follow Exit Care instructions closely. I have reviewed the disposition diagnosis with the patient and or their family/guardian. I have answered their questions and given discharge instructions.  They voiced understanding of these instructions and did not

## 2019-07-17 LAB
EKG ATRIAL RATE: 88 BPM
EKG P AXIS: 78 DEGREES
EKG P-R INTERVAL: 162 MS
EKG Q-T INTERVAL: 376 MS
EKG QRS DURATION: 78 MS
EKG QTC CALCULATION (BAZETT): 454 MS
EKG R AXIS: 37 DEGREES
EKG T AXIS: 46 DEGREES
EKG VENTRICULAR RATE: 88 BPM

## 2019-09-12 RX ORDER — PANTOPRAZOLE SODIUM 20 MG/1
TABLET, DELAYED RELEASE ORAL
Qty: 90 TABLET | Refills: 3 | Status: SHIPPED | OUTPATIENT
Start: 2019-09-12 | End: 2020-09-08

## 2019-10-14 PROBLEM — I10 ESSENTIAL HYPERTENSION: Status: ACTIVE | Noted: 2019-10-14

## 2019-11-22 ENCOUNTER — OFFICE VISIT (OUTPATIENT)
Dept: CARDIOLOGY | Age: 69
End: 2019-11-22
Payer: MEDICARE

## 2019-11-22 VITALS
HEIGHT: 63 IN | SYSTOLIC BLOOD PRESSURE: 131 MMHG | BODY MASS INDEX: 25.37 KG/M2 | OXYGEN SATURATION: 96 % | WEIGHT: 143.2 LBS | HEART RATE: 71 BPM | DIASTOLIC BLOOD PRESSURE: 87 MMHG | RESPIRATION RATE: 16 BRPM

## 2019-11-22 DIAGNOSIS — I20.9 ANGINA, CLASS II (HCC): ICD-10-CM

## 2019-11-22 DIAGNOSIS — I10 ESSENTIAL HYPERTENSION: ICD-10-CM

## 2019-11-22 DIAGNOSIS — I20.1 CORONARY VASOSPASM (HCC): Primary | ICD-10-CM

## 2019-11-22 DIAGNOSIS — I35.1 MODERATE AORTIC REGURGITATION: ICD-10-CM

## 2019-11-22 DIAGNOSIS — I25.10 ASHD (ARTERIOSCLEROTIC HEART DISEASE): Chronic | ICD-10-CM

## 2019-11-22 DIAGNOSIS — E78.2 MIXED HYPERLIPIDEMIA: ICD-10-CM

## 2019-11-22 PROCEDURE — G8399 PT W/DXA RESULTS DOCUMENT: HCPCS | Performed by: FAMILY MEDICINE

## 2019-11-22 PROCEDURE — G8598 ASA/ANTIPLAT THER USED: HCPCS | Performed by: FAMILY MEDICINE

## 2019-11-22 PROCEDURE — 99214 OFFICE O/P EST MOD 30 MIN: CPT | Performed by: FAMILY MEDICINE

## 2019-11-22 PROCEDURE — G8484 FLU IMMUNIZE NO ADMIN: HCPCS | Performed by: FAMILY MEDICINE

## 2019-11-22 PROCEDURE — 1036F TOBACCO NON-USER: CPT | Performed by: FAMILY MEDICINE

## 2019-11-22 PROCEDURE — 1123F ACP DISCUSS/DSCN MKR DOCD: CPT | Performed by: FAMILY MEDICINE

## 2019-11-22 PROCEDURE — 1090F PRES/ABSN URINE INCON ASSESS: CPT | Performed by: FAMILY MEDICINE

## 2019-11-22 PROCEDURE — G8417 CALC BMI ABV UP PARAM F/U: HCPCS | Performed by: FAMILY MEDICINE

## 2019-11-22 PROCEDURE — 4040F PNEUMOC VAC/ADMIN/RCVD: CPT | Performed by: FAMILY MEDICINE

## 2019-11-22 PROCEDURE — G8427 DOCREV CUR MEDS BY ELIG CLIN: HCPCS | Performed by: FAMILY MEDICINE

## 2019-11-22 PROCEDURE — 3017F COLORECTAL CA SCREEN DOC REV: CPT | Performed by: FAMILY MEDICINE

## 2020-01-20 RX ORDER — ATORVASTATIN CALCIUM 40 MG/1
40 TABLET, FILM COATED ORAL DAILY
Qty: 90 TABLET | Refills: 3 | Status: SHIPPED | OUTPATIENT
Start: 2020-01-20 | End: 2021-02-11 | Stop reason: SDUPTHER

## 2020-01-22 PROBLEM — I73.9 PVD (PERIPHERAL VASCULAR DISEASE) WITH CLAUDICATION (HCC): Status: RESOLVED | Noted: 2018-06-13 | Resolved: 2020-01-22

## 2020-05-07 ENCOUNTER — TELEPHONE (OUTPATIENT)
Dept: CARDIOLOGY | Age: 70
End: 2020-05-07

## 2020-05-19 RX ORDER — AMLODIPINE BESYLATE 5 MG/1
TABLET ORAL
Qty: 45 TABLET | Refills: 2 | Status: SHIPPED | OUTPATIENT
Start: 2020-05-19 | End: 2020-12-14 | Stop reason: ALTCHOICE

## 2020-05-26 RX ORDER — NITROGLYCERIN 0.4 MG/1
TABLET SUBLINGUAL
Qty: 75 TABLET | Refills: 0 | Status: SHIPPED | OUTPATIENT
Start: 2020-05-26 | End: 2020-10-19

## 2020-05-27 PROBLEM — J44.9 MODERATE COPD (CHRONIC OBSTRUCTIVE PULMONARY DISEASE) (HCC): Status: ACTIVE | Noted: 2020-05-27

## 2020-05-28 ENCOUNTER — HOSPITAL ENCOUNTER (OUTPATIENT)
Dept: WOMENS IMAGING | Age: 70
Discharge: HOME | End: 2020-05-30
Payer: MEDICARE

## 2020-05-28 PROCEDURE — 77063 BREAST TOMOSYNTHESIS BI: CPT

## 2020-06-03 ENCOUNTER — HOSPITAL ENCOUNTER (OUTPATIENT)
Age: 70
Discharge: HOME | End: 2020-06-05
Payer: MEDICARE

## 2020-06-03 ENCOUNTER — HOSPITAL ENCOUNTER (OUTPATIENT)
Dept: GENERAL RADIOLOGY | Age: 70
Discharge: HOME | End: 2020-06-05
Payer: MEDICARE

## 2020-06-03 PROCEDURE — 71046 X-RAY EXAM CHEST 2 VIEWS: CPT

## 2020-06-05 ENCOUNTER — OFFICE VISIT (OUTPATIENT)
Dept: CARDIOLOGY | Age: 70
End: 2020-06-05
Payer: MEDICARE

## 2020-06-05 VITALS
SYSTOLIC BLOOD PRESSURE: 106 MMHG | HEART RATE: 73 BPM | WEIGHT: 145 LBS | DIASTOLIC BLOOD PRESSURE: 71 MMHG | BODY MASS INDEX: 25.69 KG/M2 | RESPIRATION RATE: 18 BRPM | OXYGEN SATURATION: 96 % | HEIGHT: 63 IN

## 2020-06-05 PROCEDURE — 99214 OFFICE O/P EST MOD 30 MIN: CPT | Performed by: FAMILY MEDICINE

## 2020-06-05 PROCEDURE — 1090F PRES/ABSN URINE INCON ASSESS: CPT | Performed by: FAMILY MEDICINE

## 2020-06-05 PROCEDURE — 99211 OFF/OP EST MAY X REQ PHY/QHP: CPT | Performed by: FAMILY MEDICINE

## 2020-06-05 PROCEDURE — 1036F TOBACCO NON-USER: CPT | Performed by: FAMILY MEDICINE

## 2020-06-05 PROCEDURE — 4040F PNEUMOC VAC/ADMIN/RCVD: CPT | Performed by: FAMILY MEDICINE

## 2020-06-05 PROCEDURE — 3017F COLORECTAL CA SCREEN DOC REV: CPT | Performed by: FAMILY MEDICINE

## 2020-06-05 PROCEDURE — G8399 PT W/DXA RESULTS DOCUMENT: HCPCS | Performed by: FAMILY MEDICINE

## 2020-06-05 PROCEDURE — 1123F ACP DISCUSS/DSCN MKR DOCD: CPT | Performed by: FAMILY MEDICINE

## 2020-06-05 PROCEDURE — G8417 CALC BMI ABV UP PARAM F/U: HCPCS | Performed by: FAMILY MEDICINE

## 2020-06-05 PROCEDURE — G8427 DOCREV CUR MEDS BY ELIG CLIN: HCPCS | Performed by: FAMILY MEDICINE

## 2020-06-05 NOTE — PROGRESS NOTES
low/intermediate risk for significant CAD    H/O echocardiogram 5/21/13, 6/24/14    EF>55%, mild concentric LVH    History of echocardiogram 11/27/2018    EF of 60%. evidence of moderate diastolic dysfunction is seen.  Hx of cardiac catheterization 08/09/2018    MIld CAD w/o any significant focal stenosis with a normal LV end diastolic pressure LVEDP interestingly the pt developed pretty pronounced ST depression on Lt main engagment with mod chest pressure which resolved o disengagement of the LT main    Hx of heart artery stent 10/06/2011    Lesion of Prox RCA 75% stenosis-Drug Eluting stent to RCA    Hx of percutaneous left heart catheterization 04/07/2011    LMCA, LAD & RAMUS- normal, LCX-lesion on Mid CX 40% stenosis, RCA-Patent stent, EF 60%    Hx of percutaneous left heart catheterization 04/11/2012    Normal renals, Patent RCA stents with mid LCX stenosis IVUS suggest 60-70% stenosis and due to continues ischemia, a LISA was inserted    Hx of percutaneous left heart catheterization 09/27/2012    LMCA & LAD -Normal, LCXMild luminal irregularities, Patent prior stent, RCA-Patent stents in Prox and mid segment    Hyperlipidemia     Normal nuclear stress test 06/06/2012       CURRENT ALLERGIES: Benadryl [diphenhydramine]; Iv dye [iodides]; and Pcn [penicillins] REVIEW OF SYSTEMS: 14 systems were reviewed. Pertinent positives and negatives as above, all else negative. Past Surgical History:   Procedure Laterality Date    CARDIAC CATHETERIZATION Left 08/09/2018    right radial/ [employer] [facility]/ Dr Chao Boys significant coronary artery disease. Widely patent RCA stent.  However, the patient did have pretty severe ST depression on left main engagement with moderate chest pressure that resolved on disengagement   [address]      disc replaced in neck    Social History:  Social History     Tobacco Use    Smoking status: Former Smoker     Packs/day: 2.00     Years: 20.00     Pack years: 40.00     Types: Cigarettes     Last attempt to quit: 2000     Years since quittin.0    Smokeless tobacco: Never Used   Substance Use Topics    Alcohol use: Yes     Comment: social    Drug use: No        CURRENT MEDICATIONS:  Outpatient Medications Marked as Taking for the 20 encounter (Office Visit) with Pawel Virgen MD   Medication Sig Dispense Refill    ipratropium-albuterol (DUONEB) 0.5-2.5 (3) MG/3ML SOLN nebulizer solution Inhale 3 mLs into the lungs every 4 hours as needed for Shortness of Breath 360 mL 5    nitroGLYCERIN (NITROSTAT) 0.4 MG SL tablet DISSOLVE 1 TABLET UNDER THE TONGUE EVERY 5 MINUTES AS  NEEDED NOT TO EXCEED 3  TABLETS IN 15 MINUTES CALL  911 IF CHEST PAIN PERSISTS 75 tablet 0    amLODIPine (NORVASC) 5 MG tablet TAKE ONE-HALF TABLET BY MOUTH DAILY 45 tablet 2    albuterol sulfate  (90 Base) MCG/ACT inhaler Inhale 2 puffs into the lungs every 6 hours as needed for Wheezing 3 Inhaler 3    rOPINIRole (REQUIP) 0.5 MG tablet TAKE 2 TABLETS BY MOUTH  DAILY 180 tablet 3    atorvastatin (LIPITOR) 40 MG tablet TAKE 1 TABLET BY MOUTH  DAILY 90 tablet 3    pantoprazole (PROTONIX) 20 MG tablet TAKE 1 TABLET BY MOUTH  DAILY 90 tablet 3    famciclovir (FAMVIR) 250 MG tablet Take 250 mg by mouth 2 times daily      timolol (BETIMOL) 0.5 % ophthalmic solution Place 1 drop into the right eye 2 times daily      budesonide-formoterol (SYMBICORT) 160-4.5 MCG/ACT AERO Inhale 2 puffs into the lungs 2 times daily as needed (SOB) 3 Inhaler 3    RESTASIS 0.05 % ophthalmic emulsion Place 1 drop into both eyes 2 times daily       latanoprost (XALATAN) 0.005 % ophthalmic solution Place 1 drop into both eyes nightly       aspirin 81 MG tablet Take 81 mg by mouth daily. FAMILY HISTORY: family history includes Cancer in her brother and mother.      PHYSICAL EXAM:   /71 (Site: Left Upper Arm, Position: Sitting, Cuff Size: Medium Adult)   Pulse 73   Resp 18   Ht 5' 2.99\" (1.6 m)   Wt 145 lb (65.8 kg)   SpO2 96%   BMI 25.69 kg/m²  Body mass index is 25.69 kg/m². Constitutional: She is oriented to person, place, and time. She appears well-developed and well-nourished. In no acute distress. HEENT: Normocephalic and atraumatic. No JVD present. Carotid bruit is not present. No mass and no thyromegaly present. No lymphadenopathy present. Cardiovascular: Normal rate, regular rhythm, normal heart sounds. Exam reveals no gallop and no friction rubs. 1/6 systolic murmur, 5th intercostal space on the LEFT in the mid-clavicular line (cardiac apex). Pulmonary/Chest: Effort normal and breath sounds normal. No respiratory distress. She has no wheezes, rhonchi or rales. Abdominal: Soft, non-tender. Bowel sounds and aorta are normal. She exhibits no organomegaly, mass or bruit. Extremities: No edema. No cyanosis and no clubbing. Pulses are 2+ radial and carotid pulses. 2+ dorsalis pedis and posterior tibial pulses bilaterally. Neurological: She is alert and oriented to person, place, and time. No evidence of gross cranial nerve deficit. Coordination appeared normal.   Skin: Skin is warm and dry. There is no rash or diaphoresis. Psychiatric: She has a normal mood and affect. Her speech is normal and behavior is normal.      MOST RECENT LABS ON RECORD:   Lab Results   Component Value Date    WBC 6.9 07/14/2019    HGB 14.4 07/14/2019    HCT 43.2 07/14/2019     07/14/2019    CHOL 158 08/11/2015    TRIG 88 08/11/2015    HDL 74 02/08/2019    LDLCHOLESTEROL 82 02/08/2019    ALT 29 06/26/2019    AST 26 06/26/2019     07/14/2019    K 4.8 07/14/2019    CL 99 07/14/2019    CREATININE 0.59 07/14/2019    BUN 12 07/14/2019    CO2 29 07/14/2019    TSH 3.36 06/26/2019    INR 0.9 07/14/2019        ASSESSMENT:  Encounter Diagnoses   Name Primary?     ASHD (arteriosclerotic heart disease) Yes    Coronary vasospasm ([address]

## 2020-06-19 ENCOUNTER — HOSPITAL ENCOUNTER (OUTPATIENT)
Dept: PREADMISSION TESTING | Age: 70
Setting detail: SPECIMEN
Discharge: HOME | End: 2020-06-23
Payer: MEDICARE

## 2020-06-19 DIAGNOSIS — Z01.818 PREOP TESTING: Primary | ICD-10-CM

## 2020-06-19 PROCEDURE — U0003 INFECTIOUS AGENT DETECTION BY NUCLEIC ACID (DNA OR RNA); SEVERE ACUTE RESPIRATORY SYNDROME CORONAVIRUS 2 (SARS-COV-2) (CORONAVIRUS DISEASE [COVID-19]), AMPLIFIED PROBE TECHNIQUE, MAKING USE OF HIGH THROUGHPUT TECHNOLOGIES AS DESCRIBED BY CMS-2020-01-R: HCPCS

## 2020-06-21 LAB — SARS-COV-2, NAA: NOT DETECTED

## 2020-06-22 ENCOUNTER — TELEPHONE (OUTPATIENT)
Dept: PRIMARY CARE CLINIC | Age: 70
End: 2020-06-22

## 2020-06-23 ENCOUNTER — HOSPITAL ENCOUNTER (OUTPATIENT)
Dept: PULMONOLOGY | Age: 70
Discharge: HOME | End: 2020-06-23
Payer: MEDICARE

## 2020-06-23 PROCEDURE — 94060 EVALUATION OF WHEEZING: CPT

## 2020-06-23 PROCEDURE — 94729 DIFFUSING CAPACITY: CPT

## 2020-06-23 PROCEDURE — 94726 PLETHYSMOGRAPHY LUNG VOLUMES: CPT

## 2020-06-23 PROCEDURE — 6370000000 HC RX 637 (ALT 250 FOR IP): Performed by: INTERNAL MEDICINE

## 2020-06-23 PROCEDURE — 94664 DEMO&/EVAL PT USE INHALER: CPT

## 2020-06-23 RX ORDER — ALBUTEROL SULFATE 90 UG/1
4 AEROSOL, METERED RESPIRATORY (INHALATION) ONCE
Status: COMPLETED | OUTPATIENT
Start: 2020-06-23 | End: 2020-06-23

## 2020-06-23 RX ADMIN — ALBUTEROL SULFATE 4 PUFF: 90 AEROSOL, METERED RESPIRATORY (INHALATION) at 13:16

## 2020-06-30 NOTE — PROCEDURES
[address], [place] [address]                               PULMONARY FUNCTION    PATIENT NAME: Adrianna Linn                 :        1950  MED REC NO:   [id number]                              ROOM:  ACCOUNT NO:   [de-identified]                           ADMIT DATE: 2020  PROVIDER:     Chika Duncan    DATE OF PROCEDURE:  2020    The patient's spirometry shows a flow volume loop which is restricted. FEV1 is 47% of predicted. There was 21% bronchodilator change from 1.04  liters to 1.26 liters. Forced vital capacity 1.24 liters, 49% of predicted with 22%  bronchodilator change to 60% of predicted. The patient's FEV1/FVC ratio 72. Lung volumes by body box showed total lung capacity 148% of predicted. % of predicted consistent with air trapping. Diffusion capacity 41% of predicted uncorrected, corrected for alveolar  volume 76% of predicted and airway resistance is increased. FINAL IMPRESSION:  The study shows a combined obstructive and  restrictive dysfunction. The ventilatory dysfunction is of moderate  severity with a significant bronchodilator response. Clinical  correlation advised. Donald Hernandez    D: 2020 16:48:42       T: 2020 21:13:52     [email or web address]  Job#: [id number]     Doc#: [id number]    CC:  Sherlyn Thakkar.  Mar Gore

## 2020-07-27 ENCOUNTER — OFFICE VISIT (OUTPATIENT)
Dept: PULMONOLOGY | Age: 70
End: 2020-07-27
Payer: MEDICARE

## 2020-07-27 ENCOUNTER — HOSPITAL ENCOUNTER (OUTPATIENT)
Dept: INFUSION THERAPY | Age: 70
Discharge: HOME | End: 2020-07-27
Payer: MEDICARE

## 2020-07-27 VITALS
DIASTOLIC BLOOD PRESSURE: 63 MMHG | SYSTOLIC BLOOD PRESSURE: 94 MMHG | OXYGEN SATURATION: 96 % | HEIGHT: 63 IN | BODY MASS INDEX: 25.69 KG/M2 | WEIGHT: 145 LBS | RESPIRATION RATE: 16 BRPM | HEART RATE: 83 BPM | TEMPERATURE: 99.6 F

## 2020-07-27 PROCEDURE — 6360000002 HC RX W HCPCS: Performed by: INTERNAL MEDICINE

## 2020-07-27 PROCEDURE — 1090F PRES/ABSN URINE INCON ASSESS: CPT | Performed by: INTERNAL MEDICINE

## 2020-07-27 PROCEDURE — G8417 CALC BMI ABV UP PARAM F/U: HCPCS | Performed by: INTERNAL MEDICINE

## 2020-07-27 PROCEDURE — 4040F PNEUMOC VAC/ADMIN/RCVD: CPT | Performed by: INTERNAL MEDICINE

## 2020-07-27 PROCEDURE — G8399 PT W/DXA RESULTS DOCUMENT: HCPCS | Performed by: INTERNAL MEDICINE

## 2020-07-27 PROCEDURE — 3023F SPIROM DOC REV: CPT | Performed by: INTERNAL MEDICINE

## 2020-07-27 PROCEDURE — G8926 SPIRO NO PERF OR DOC: HCPCS | Performed by: INTERNAL MEDICINE

## 2020-07-27 PROCEDURE — 3017F COLORECTAL CA SCREEN DOC REV: CPT | Performed by: INTERNAL MEDICINE

## 2020-07-27 PROCEDURE — G0009 ADMIN PNEUMOCOCCAL VACCINE: HCPCS | Performed by: INTERNAL MEDICINE

## 2020-07-27 PROCEDURE — 90732 PPSV23 VACC 2 YRS+ SUBQ/IM: CPT | Performed by: INTERNAL MEDICINE

## 2020-07-27 PROCEDURE — 1123F ACP DISCUSS/DSCN MKR DOCD: CPT | Performed by: INTERNAL MEDICINE

## 2020-07-27 PROCEDURE — G8427 DOCREV CUR MEDS BY ELIG CLIN: HCPCS | Performed by: INTERNAL MEDICINE

## 2020-07-27 PROCEDURE — 99204 OFFICE O/P NEW MOD 45 MIN: CPT | Performed by: INTERNAL MEDICINE

## 2020-07-27 PROCEDURE — 99213 OFFICE O/P EST LOW 20 MIN: CPT | Performed by: INTERNAL MEDICINE

## 2020-07-27 PROCEDURE — 1036F TOBACCO NON-USER: CPT | Performed by: INTERNAL MEDICINE

## 2020-07-27 RX ORDER — TIOTROPIUM BROMIDE 18 UG/1
18 CAPSULE ORAL; RESPIRATORY (INHALATION) DAILY
Qty: 30 CAPSULE | Refills: 3 | Status: SHIPPED | OUTPATIENT
Start: 2020-07-27 | End: 2020-12-24

## 2020-07-27 RX ADMIN — PNEUMOCOCCAL VACCINE POLYVALENT 0.5 ML
25; 25; 25; 25; 25; 25; 25; 25; 25; 25; 25; 25; 25; 25; 25; 25; 25; 25; 25; 25; 25; 25; 25 INJECTION, SOLUTION INTRAMUSCULAR; SUBCUTANEOUS at 12:21

## 2020-07-27 NOTE — PROGRESS NOTES
PULMONARY MEDICINE OUTPATIENT CONSULT NOTE     PATIENT:  Ronald Anand  YOB: 1950  MRN: [id number]     REFERRED BY: Jeet Beasley MD   CHIEF COMPLIANT: Asthma (New patient ref. for evaluation and treatment . .. had PFT done . . reports SOB the last few years . .reprorts using nebulizer and inhalers)       HISTORY     HISTORY OF PRESENT ILLNESS:   Ronald Anand is a 71y.o. year old female here for establishing care. She has past medical history significant for coronary artery disease with multiple stents. Current symptoms: Patient complains of worsening shortness of breath and productive cough for last several years. She has grade 2 exertional dyspnea. She denies any chest pain or wheezing  Tobacco history: Patient  reports that she quit smoking about 20 years ago. Her smoking use included cigarettes. She has a 40.00 pack-year smoking history. She has never used smokeless tobacco.   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 Pulmonary function test: Showed combined obstructive/restrictive ventilatory impairment chest x-ray within normal limits. Imaging: Last CT (10/18) showed biapical scarring, no consolidation, mass or nodules  Current medications: She is currently on Symbicort and as needed albuterol along with DuoNeb's aerosols. PAST MEDICAL HISTORY:      Diagnosis Date    CAD (coronary artery disease) 4/11, 9/12    4 total stents. 2 Stents in RCA, 1 in small OM1.  H/O cardiovascular stress test 5/22/13, 6/25/14    Relatively normal without evidence of significant ischemia or infarction. global LV systolic function was normal w/o regional wall motion abnormalities. No significant EKG evidence of ischemia during monitoring w/o significant associated arrhythmias.  Tay score is 5.     H/O cardiovascular stress test 12/28/2017    Larglely normal myocardial perfusion imaging with soft tissue artifact but w/o evidence of significant myocardial ischemia or infarction. results are most consistent with low/intermediate risk for significant CAD    H/O echocardiogram 5/21/13, 6/24/14    EF>55%, mild concentric LVH    History of echocardiogram 11/27/2018    EF of 60%. evidence of moderate diastolic dysfunction is seen.  Hx of cardiac catheterization 08/09/2018    MIld CAD w/o any significant focal stenosis with a normal LV end diastolic pressure LVEDP interestingly the pt developed pretty pronounced ST depression on Lt main engagment with mod chest pressure which resolved o disengagement of the LT main    Hx of heart artery stent 10/06/2011    Lesion of Prox RCA 75% stenosis-Drug Eluting stent to RCA    Hx of percutaneous left heart catheterization 04/07/2011    LMCA, LAD & RAMUS- normal, LCX-lesion on Mid CX 40% stenosis, RCA-Patent stent, EF 60%    Hx of percutaneous left heart catheterization 04/11/2012    Normal renals, Patent RCA stents with mid LCX stenosis IVUS suggest 60-70% stenosis and due to continues ischemia, a LISA was inserted    Hx of percutaneous left heart catheterization 09/27/2012    LMCA & LAD -Normal, LCXMild luminal irregularities, Patent prior stent, RCA-Patent stents in Prox and mid segment    Hyperlipidemia     Normal nuclear stress test 06/06/2012     PAST SURGICAL HISTORY:      Procedure Laterality Date    CARDIAC CATHETERIZATION Left 08/09/2018    right radial/ [facility]/ Dr William Jefferson significant coronary artery disease. Widely patent RCA stent.  However, the patient did have pretty severe ST depression on left main engagement with moderate chest pressure that resolved on disengagement    CARPAL [address]      disc replaced in neck     FAMILY HISTORY:      Problem Relation Age of Onset    Cancer Mother         breast cancer    Cancer Brother      SOCIAL HISTORY:  TOBACCO: by mouth daily.  budesonide-formoterol (SYMBICORT) 160-4.5 MCG/ACT AERO Inhale 2 puffs into the lungs 2 times daily as needed (SOB) 3 Inhaler 3     No facility-administered medications prior to visit.          REVIEW OF SYSTEMS:   General: negative for - chills, fever, or sleep disturbance   Psychological: negative for - anxiety or depression   Ophthalmic: negative for - dry or itchy eyes, or loss of vision   ENT: negative   Allergy and Immunology: negative   Hematological and Lymphatic: negative for - bleeding problems, jaundice, or swollen lymph nodes   Endocrine: negative for - polydipsia/polyuria or temperature intolerance   Respiratory: positive for - cough and shortness of breath   Cardiovascular: negative for - chest pain or palpitations   Gastrointestinal: negative for - change in bowel habits, nausea/vomiting, or swallowing difficulty/pain   Genito-Urinary: negative for - dysuria or urinary frequency/urgency   Musculoskeletal: negative for - joint pain or joint swelling   Neurological: negative for - numbness/tingling or weakness   Dermatological: negative for - rash or skin lesion changes      PHYSICAL EXAMINATION      VITAL SIGNS:   BP 94/63   Pulse 83   Temp 99.6 °F (37.6 °C)   Resp 16   Ht 5' 3\" (1.6 m)   Wt 145 lb (65.8 kg)   SpO2 96%   BMI 25.69 kg/m²   Wt Readings from Last 3 Encounters:   07/27/20 145 lb (65.8 kg)   06/05/20 145 lb (65.8 kg)   06/03/20 146 lb (66.2 kg)     SYSTEMIC EXAMINATION:   General appearance - alert, well appearing, and in no distress and overweight   Eyes - sclera anicteric, no conjunctival injection injection   ENT - hearing grossly normal bilaterally,  Nose normal and patent, no erythema, discharge or polyps, Oral mucous membranes moist, pharynx normal without lesions, Mallampati Airway Score - II (soft palate, uvula, fauces visible)   Neck/Lymphatics - supple, no significant adenopathy, carotids upstroke normal bilaterally, no bruits   Chest- clear to auscultation, no wheezes, rales or rhonchi, symmetric air entry   Heart - normal rate, regular rhythm, normal S1, S2, no murmurs, rubs, clicks or gallops   Abdomen - soft, nontender, non distended   Neurological/Psych- motor and sensory grossly normal bilaterally, alert, oriented to person, place, and time   Musculoskeletal/Extremities- no joint tenderness or swelling, peripheral pulses normal, no pedal edema, no clubbing or cyanosis   Skin - normal coloration and turgor, no rashes, no suspicious skin lesions noted     LABORATORY DATA      LABS:  ABG:   No results found for: PH, PHART, PCO2, WZZ5ISP, PO2, PO2ART, HCO3, WNL7KDD, BE, BEART, THGB, M8APHIVQ  VBG:  No results found for: PHVEN, OJV5LUE, BEVEN, G2SGAUWE  CBC:   Lab Results   Component Value Date    WBC 6.9 07/14/2019    RBC 4.49 07/14/2019    HGB 14.4 07/14/2019    HCT 43.2 07/14/2019     07/14/2019    MCV 96.1 07/14/2019    MCH 32.1 07/14/2019    MCHC 33.4 07/14/2019    RDW 13.2 07/14/2019    LYMPHOPCT 21 07/14/2019    MONOPCT 9 07/14/2019    BASOPCT 1 07/14/2019    MONOSABS 0.60 07/14/2019    LYMPHSABS 1.40 07/14/2019    EOSABS 0.10 07/14/2019    BASOSABS 0.00 07/14/2019    DIFFTYPE NOT REPORTED 07/14/2019     Eosinophils/IgE:   Lab Results   Component Value Date    EOSABS 0.10 07/14/2019     Alpha 1 antitrypsin:   Lab Results   Component Value Date    A1A 125 07/30/2020     CMP:   Lab Results   Component Value Date     07/14/2019    K 4.8 07/14/2019    CL 99 07/14/2019    CO2 29 07/14/2019    BUN 12 07/14/2019    CREATININE 0.59 07/14/2019    GLUCOSE 125 (H) 07/14/2019    MG 2.4 06/05/2012    CALCIUM 9.8 07/14/2019    PROT 7.8 06/26/2019    LABALBU 4.5 06/26/2019    BILITOT 0.44 06/26/2019    ALT 29 06/26/2019    AST 26 06/26/2019    ALKPHOS 85 06/26/2019     Coagulation Profile:   Lab Results   Component Value Date    INR 0.9 07/14/2019    PROTIME 9.8 07/14/2019    APTT 26.3 06/04/2012     BNP:   No results found for: BNP, total lung capacity 148% of predicted. % of predicted consistent with air trapping.     Diffusion capacity 41% of predicted uncorrected, corrected for alveolar  volume 76% of predicted and airway resistance is increased.     FINAL IMPRESSION:  The study shows a combined obstructive and  restrictive dysfunction. The ventilatory dysfunction is of moderate  severity with a significant bronchodilator response. Clinical  correlation advised. ECHOCARDIOGRAM:   Results for orders placed during the hospital encounter of 11/27/18   Echo 2D w doppler w color complete    Narrative   Summary  Global left ventricular systolic function appears preserved with an  estimated ejection fraction of 60%. Normal left ventricular wall thickness with a normal left ventricular cavity  size. Normal aortic valve structure with moderate aortic regurgitation. Evidence of moderate diastolic dysfunction is seen. Compared to the previous study of 12/12/14, the patients aortic  regurgitation has advanced from mild to more moderate aortic regurgitation and now has evidence of diastolic dysfunction. CARDIAC CATHETERIZATION: 8/9/2018   Procedure Summary      Mild coronary artery disease without any significant focal stenosis with a   normal left ventricular end diastolic pressure (LVEDP). Interestingly, the patient developed pretty pronounced ST depression on   left main engagement with moderate chest pressure which resolved on   disengagement of the left main. Recommendations      Proceed with risk factor modification as clinically indicated. Consider additional workup to evaluate for other etiologies of the patient   symptoms of shortness of breath and fatigue if clinically indicated.       Although the patient did develop ECG changes and chest pressure during the   procedure and this would be suggestive of small vessel coronary artery   disease, I do not think that this is likely related to the patients   symptoms of shortness of breath. ASSESSMENT AND PLAN     ASSESSMENT:    ICD-10-CM    1. Stage 2 moderate COPD by GOLD classification ([facility]  J44.9 tiotropium (SPIRIVA HANDIHALER) 18 MCG inhalation capsule     Fluticasone furoate-vilanterol (BREO ELLIPTA) 200-25 MCG/INH AEPB inhaler     Alpha-1-Antitrypsin W/ Phenotype   2. Former moderate cigarette smoker (10-19 per day)  Z87.891    3. ASHD (arteriosclerotic heart disease)  I25.10      PLAN/RECOMMENDATIONS:     Pulmonary function tests reviewed   Chest x-ray/CT scan of the chest reviewed   Ordered alpha-1 antitrypsin level/phenotype   Patient is currently on on Symbicort and as needed DuoNeb aerosols/albuterol HFA   We will start Spiriva, Breo Ellipta continue as needed DuoNeb aerosols and albuterol HFA   Reviewed use of rescue versus controlling agents and potential side effects   Refills were provided    Barriers to medication compliance addressed.  Patient was recommended to have prednisone and an antibiotic available for use during an exacerbation   Patient received counseling on the following healthy behaviors: nutrition, exercise and medication adherence   Maintain an active lifestyle   Recommended to continue smoking cessation  Lung Cancer Screening: After reviewing the patient's smoking history, age and other clinical criteria, the LOW DOSE CT is : NOT INDICATED; Quit smoking >15 years ago   Recommend influenza vaccination in the fall annually; Up-to-date   Recommendations were given regarding pneumococcal vaccination; Up-to-date    All the questions that the patient had were answered to her satisfaction  We'll see the patient back in three months  Thank you for having us involved in the care of your patient. Please call us if you have any questions or concerns. Ankush Ceballos MD    Pulmonary and Critical Care Medicine            Please note that this chart was generated using voice recognition Dragon dictation software.   Although every effort was made to ensure the accuracy of this automated transcription, some errors in transcription may have occurred.

## 2020-07-27 NOTE — PATIENT INSTRUCTIONS
SURVEY:    You may be receiving a survey from [employer] regarding your visit today. Please complete the survey to enable us to provide the highest quality of care to you and your family. If you cannot score us a very good on any question, please call the office to discuss how we could have made your experience a very good one. Thank you. Patient Education        Learning About Coronavirus [phone])  Coronavirus [phone]): Overview  What is coronavirus (NDPWD-66)? The coronavirus disease (COVID-19) is caused by a virus. It is an illness that was first found in [place], [place], in December 2019. It has since spread worldwide. The virus can cause fever, cough, and trouble breathing. In severe cases, it can cause pneumonia and make it hard to breathe without help. It can cause death. Coronaviruses are a large group of viruses. They cause the common cold. They also cause more serious illnesses like Middle East respiratory syndrome (MERS) and severe acute respiratory syndrome (SARS). COVID-19 is caused by a novel coronavirus. That means it's a new type that has not been seen in people before. This virus spreads person-to-person through droplets from coughing and sneezing. It can also spread when you are close to someone who is infected. And it can spread when you touch something that has the virus on it, such as a doorknob or a tabletop. What can you do to protect yourself from coronavirus (COVID-19)? The best way to protect yourself from getting sick is to:  · Avoid areas where there is an outbreak. · Avoid contact with people who may be infected. · Wash your hands often with soap or alcohol-based hand sanitizers. · Avoid crowds and try to stay at least 6 feet away from other people. · Wash your hands often, especially after you cough or sneeze. Use soap and water, and scrub for at least 20 seconds. If soap and water aren't available, use an alcohol-based hand .   · Avoid touching your

## 2020-07-30 ENCOUNTER — HOSPITAL ENCOUNTER (OUTPATIENT)
Dept: LAB | Age: 70
Discharge: HOME | End: 2020-07-30
Payer: MEDICARE

## 2020-07-30 PROCEDURE — 82103 ALPHA-1-ANTITRYPSIN TOTAL: CPT

## 2020-07-30 PROCEDURE — 36415 COLL VENOUS BLD VENIPUNCTURE: CPT

## 2020-07-30 PROCEDURE — 82104 ALPHA-1-ANTITRYPSIN PHENO: CPT

## 2020-08-01 LAB
ALPHA-1 ANTITRYPSIN PHENOTYPE: NORMAL
ALPHA-1 ANTITRYPSIN: 125 MG/DL (ref 90–200)

## 2020-08-25 ENCOUNTER — HOSPITAL ENCOUNTER (OUTPATIENT)
Dept: LAB | Age: 70
Discharge: HOME | End: 2020-08-25
Payer: MEDICARE

## 2020-08-25 LAB
ABSOLUTE EOS #: 0.22 K/UL (ref 0–0.44)
ABSOLUTE IMMATURE GRANULOCYTE: <0.03 K/UL (ref 0–0.3)
ABSOLUTE LYMPH #: 1.53 K/UL (ref 1.1–3.7)
ABSOLUTE MONO #: 0.61 K/UL (ref 0.1–1.2)
ALBUMIN SERPL-MCNC: 4.4 G/DL (ref 3.5–5.2)
ALBUMIN/GLOBULIN RATIO: 1.4 (ref 1–2.5)
ALP BLD-CCNC: 85 U/L (ref 35–104)
ALT SERPL-CCNC: 23 U/L (ref 5–33)
ANION GAP SERPL CALCULATED.3IONS-SCNC: 11 MMOL/L (ref 9–17)
AST SERPL-CCNC: 20 U/L
BASOPHILS # BLD: 1 % (ref 0–2)
BASOPHILS ABSOLUTE: 0.05 K/UL (ref 0–0.2)
BILIRUB SERPL-MCNC: 0.37 MG/DL (ref 0.3–1.2)
BUN BLDV-MCNC: 17 MG/DL (ref 8–23)
BUN/CREAT BLD: 30 (ref 9–20)
CALCIUM SERPL-MCNC: 9.7 MG/DL (ref 8.6–10.4)
CHLORIDE BLD-SCNC: 100 MMOL/L (ref 98–107)
CHOLESTEROL, FASTING: 188 MG/DL
CHOLESTEROL/HDL RATIO: 2.9
CO2: 23 MMOL/L (ref 20–31)
CREAT SERPL-MCNC: 0.56 MG/DL (ref 0.5–0.9)
DIFFERENTIAL TYPE: NORMAL
EOSINOPHILS RELATIVE PERCENT: 4 % (ref 1–4)
FOLATE: 14.6 NG/ML
GFR AFRICAN AMERICAN: >60 ML/MIN
GFR NON-AFRICAN AMERICAN: >60 ML/MIN
GFR SERPL CREATININE-BSD FRML MDRD: ABNORMAL ML/MIN/{1.73_M2}
GFR SERPL CREATININE-BSD FRML MDRD: ABNORMAL ML/MIN/{1.73_M2}
GLUCOSE BLD-MCNC: 104 MG/DL (ref 70–99)
HCT VFR BLD CALC: 42.4 % (ref 36.3–47.1)
HDLC SERPL-MCNC: 64 MG/DL
HEMOGLOBIN: 13.7 G/DL (ref 11.9–15.1)
IMMATURE GRANULOCYTES: 0 %
LDL CHOLESTEROL: 112 MG/DL (ref 0–130)
LYMPHOCYTES # BLD: 30 % (ref 24–43)
MCH RBC QN AUTO: 30.9 PG (ref 25.2–33.5)
MCHC RBC AUTO-ENTMCNC: 32.3 G/DL (ref 28.4–34.8)
MCV RBC AUTO: 95.7 FL (ref 82.6–102.9)
MONOCYTES # BLD: 12 % (ref 3–12)
NRBC AUTOMATED: 0 PER 100 WBC
PDW BLD-RTO: 12.4 % (ref 11.8–14.4)
PLATELET # BLD: 260 K/UL (ref 138–453)
PLATELET ESTIMATE: NORMAL
PMV BLD AUTO: 8.6 FL (ref 8.1–13.5)
POTASSIUM SERPL-SCNC: 4.4 MMOL/L (ref 3.7–5.3)
RBC # BLD: 4.43 M/UL (ref 3.95–5.11)
RBC # BLD: NORMAL 10*6/UL
SEG NEUTROPHILS: 53 % (ref 36–65)
SEGMENTED NEUTROPHILS ABSOLUTE COUNT: 2.68 K/UL (ref 1.5–8.1)
SODIUM BLD-SCNC: 134 MMOL/L (ref 135–144)
TOTAL PROTEIN: 7.5 G/DL (ref 6.4–8.3)
TRIGLYCERIDE, FASTING: 62 MG/DL
TSH SERPL DL<=0.05 MIU/L-ACNC: 2.86 MIU/L (ref 0.3–5)
VITAMIN B-12: 599 PG/ML (ref 232–1245)
VLDLC SERPL CALC-MCNC: NORMAL MG/DL (ref 1–30)
WBC # BLD: 5.1 K/UL (ref 3.5–11.3)
WBC # BLD: NORMAL 10*3/UL

## 2020-08-25 PROCEDURE — 80053 COMPREHEN METABOLIC PANEL: CPT

## 2020-08-25 PROCEDURE — 82607 VITAMIN B-12: CPT

## 2020-08-25 PROCEDURE — 85025 COMPLETE CBC W/AUTO DIFF WBC: CPT

## 2020-08-25 PROCEDURE — 84443 ASSAY THYROID STIM HORMONE: CPT

## 2020-08-25 PROCEDURE — 36415 COLL VENOUS BLD VENIPUNCTURE: CPT

## 2020-08-25 PROCEDURE — 80061 LIPID PANEL: CPT

## 2020-08-25 PROCEDURE — 82746 ASSAY OF FOLIC ACID SERUM: CPT

## 2020-09-08 RX ORDER — PANTOPRAZOLE SODIUM 20 MG/1
TABLET, DELAYED RELEASE ORAL
Qty: 90 TABLET | Refills: 3 | Status: SHIPPED | OUTPATIENT
Start: 2020-09-08 | End: 2021-02-11 | Stop reason: SDUPTHER

## 2020-10-19 RX ORDER — NITROGLYCERIN 0.4 MG/1
TABLET SUBLINGUAL
Qty: 75 TABLET | Refills: 4 | Status: SHIPPED | OUTPATIENT
Start: 2020-10-19 | End: 2022-04-13 | Stop reason: SDUPTHER

## 2020-10-21 ENCOUNTER — HOSPITAL ENCOUNTER (EMERGENCY)
Age: 70
Discharge: HOME | End: 2020-10-21
Attending: EMERGENCY MEDICINE
Payer: MEDICARE

## 2020-10-21 ENCOUNTER — APPOINTMENT (OUTPATIENT)
Dept: GENERAL RADIOLOGY | Age: 70
End: 2020-10-21
Payer: MEDICARE

## 2020-10-21 VITALS
SYSTOLIC BLOOD PRESSURE: 168 MMHG | WEIGHT: 130 LBS | OXYGEN SATURATION: 97 % | HEART RATE: 86 BPM | DIASTOLIC BLOOD PRESSURE: 71 MMHG | BODY MASS INDEX: 23.04 KG/M2 | RESPIRATION RATE: 18 BRPM | HEIGHT: 63 IN | TEMPERATURE: 98.1 F

## 2020-10-21 PROCEDURE — 99284 EMERGENCY DEPT VISIT MOD MDM: CPT

## 2020-10-21 PROCEDURE — 12002 RPR S/N/AX/GEN/TRNK2.6-7.5CM: CPT

## 2020-10-21 PROCEDURE — 73090 X-RAY EXAM OF FOREARM: CPT

## 2020-10-21 RX ORDER — DIAPER,BRIEF,INFANT-TODD,DISP
EACH MISCELLANEOUS ONCE
Status: DISCONTINUED | OUTPATIENT
Start: 2020-10-21 | End: 2020-10-21 | Stop reason: HOSPADM

## 2020-10-21 RX ORDER — LIDOCAINE HYDROCHLORIDE 10 MG/ML
10 INJECTION, SOLUTION INFILTRATION; PERINEURAL ONCE
Status: DISCONTINUED | OUTPATIENT
Start: 2020-10-21 | End: 2020-10-21 | Stop reason: HOSPADM

## 2020-10-21 ASSESSMENT — PAIN SCALES - GENERAL: PAINLEVEL_OUTOF10: 8

## 2020-10-21 ASSESSMENT — PAIN DESCRIPTION - ORIENTATION: ORIENTATION: LEFT

## 2020-10-21 ASSESSMENT — PAIN DESCRIPTION - LOCATION: LOCATION: HAND

## 2020-10-21 NOTE — ED NOTES
Bed: 10  Expected date:   Expected time:   Means of arrival:   Comments:  EMS       Avelina Stewart RN  10/21/20 [id number]

## 2020-10-21 NOTE — ED PROVIDER NOTES
[unfilled]  ED encounter       [facility]   Chief Complaint   Patient presents with    Fall     Onset PTA, left hand laceration and facial injury. Denies LOC     HPI   Daniel Marin is a 79 y.o. female who lacerated her left hand / little finger. The mechanism of the injury was she fell on it. This occurred at some store when she tripped. Associated bleeding was alleviated by pressure. She has a sizeable bruise to the left forearm. REVIEW OF SYSTEMS   Neurologic: No numbness or weakness distal to the wound  Skin: Bleeding Laceration as mentioned above  Musculoskeletal: No bony deformity  Immunization: Last tetanus shot was recently, she says    PAST MEDICAL & SURGICAL HISTORY   Past Medical History:   Diagnosis Date    CAD (coronary artery disease) 4/11, 9/12    4 total stents. 2 Stents in RCA, 1 in small OM1.  H/O cardiovascular stress test 5/22/13, 6/25/14    Relatively normal without evidence of significant ischemia or infarction. global LV systolic function was normal w/o regional wall motion abnormalities. No significant EKG evidence of ischemia during monitoring w/o significant associated arrhythmias. Tay score is 5.     H/O cardiovascular stress test 12/28/2017    Larglely normal myocardial perfusion imaging with soft tissue artifact but w/o evidence of significant myocardial ischemia or infarction. results are most consistent with low/intermediate risk for significant CAD    H/O echocardiogram 5/21/13, 6/24/14    EF>55%, mild concentric LVH    History of echocardiogram 11/27/2018    EF of 60%. evidence of moderate diastolic dysfunction is seen.      Hx of cardiac catheterization 08/09/2018    MIld CAD w/o any significant focal stenosis with a normal LV end diastolic pressure LVEDP interestingly the pt developed pretty pronounced ST depression on Lt main engagment with mod chest pressure which resolved o disengagement of the LT main    Hx of heart artery stent 10/06/2011    Lesion of Prox RCA 75% stenosis-Drug Eluting stent to RCA    Hx of percutaneous left heart catheterization 04/07/2011    LMCA, LAD & RAMUS- normal, LCX-lesion on Mid CX 40% stenosis, RCA-Patent stent, EF 60%    Hx of percutaneous left heart catheterization 04/11/2012    Normal renals, Patent RCA stents with mid LCX stenosis IVUS suggest 60-70% stenosis and due to continues ischemia, a LISA was inserted    Hx of percutaneous left heart catheterization 09/27/2012    LMCA & LAD -Normal, LCXMild luminal irregularities, Patent prior stent, RCA-Patent stents in Prox and mid segment    Hyperlipidemia     Normal nuclear stress test 06/06/2012     Past Surgical History:   Procedure Laterality Date    CARDIAC CATHETERIZATION Left 08/09/2018    right radial/ [employer] [facility]/ Dr [facility] significant coronary artery disease. Widely patent RCA stent.  However, the patient did have pretty severe ST depression on left main engagement with moderate chest pressure that resolved on disengagement    CARPAL TUNNEL RELEASE     [address]      disc replaced in neck     CURRENT MEDICATIONS   Current Outpatient Rx   Medication Sig Dispense Refill    pantoprazole (PROTONIX) 20 MG tablet TAKE 1 TABLET BY MOUTH  DAILY (Patient taking differently: Take 20 mg by mouth daily TAKE 1 TABLET BY MOUTH  DAILY) 90 tablet 3    tiotropium (SPIRIVA HANDIHALER) 18 MCG inhalation capsule Inhale 1 capsule into the lungs daily 30 capsule 3    amLODIPine (NORVASC) 5 MG tablet TAKE ONE-HALF TABLET BY MOUTH DAILY 45 tablet 2    albuterol sulfate  (90 Base) MCG/ACT inhaler Inhale 2 puffs into the lungs every 6 hours as needed for Wheezing 3 Inhaler 3    rOPINIRole (REQUIP) 0.5 MG tablet TAKE 2 TABLETS BY MOUTH  DAILY 180 tablet 3    atorvastatin (LIPITOR) 40 MG tablet TAKE 1 TABLET BY MOUTH  DAILY 90 tablet 3    famciclovir (FAMVIR) 250 MG tablet Take 250 mg by mouth 2 times daily      timolol Relationship status: None    Intimate partner violence     Fear of current or ex partner: None     Emotionally abused: None     Physically abused: None     Forced sexual activity: None   Other Topics Concern    None   Social History Narrative    None     Family History   Problem Relation Age of Onset    Cancer Mother         breast cancer    Cancer Brother        PHYSICAL EXAM   VITAL SIGNS: BP (!) 168/71   Pulse 86   Temp 98.1 °F (36.7 °C) (Tympanic)   Resp 18   Ht 5' 3\" (1.6 m)   Wt 130 lb (59 kg)   SpO2 97%   BMI 23.03 kg/m²   Constitutional: Well developed, well-nourished  HENT: Atraumatic, no trismus  NECK: Supple, No neck swelling  Respiratory: No respiratory distress  Cardiovascular: No JVD   NEUROLOGIC: Motor and sensory distal to the wound is intact and normal, patient is awake, alert, no slurred speech  Musculoskeletal: no bonoy deformity  Integument: 4 cm laceration localized over the hand and the left finger, the depth down to the subcutaneous tissue, there is no foreign body in the wound, no tendon or bone exposed in the wound. Laceration Repair Procedure Note  Details of the Procedure: The patient was placed in the appropriate position and regional anesthesia obtained by infiltration using 5 cc lidocaine at the ulnar nerve where it crosses the wrist . The wound was copiously irrigated with normal saline. The laceration was explored, then closed with closed with 4-0 Ethilon using interrupted sutures. The wound area was then treated with antibiotic ointment and dressed with sterile gauze. Total repaired wound length: 4 cm. Complications: None    RADIOLOGY  XR RADIUS ULNA LEFT (2 VIEWS)   Final Result   No fracture or cortical erosion.            ED COURSE & MEDICAL DECISION MAKING   See chart for details of any medications ordered  Vitals:    10/21/20 1542 10/21/20 1546   BP: (!) 188/70 (!) 168/71   Pulse: 86    Resp: 18    Temp: 98.1 °F (36.7 °C)    TempSrc: Tympanic    SpO2: 97% Weight: 130 lb (59 kg)    Height: 5' 3\" (1.6 m)        Differential diagnosis: Tendon laceration, neurologic injury, vascular injury, involvement of bone that could lead to osteomyelitis, foreign body, left in the wound, delayed bacterial skin infection, other    MDM: Wound was repaired. Patient had no other significant injuries. She will follow-up with her regular doctor as needed    FINAL IMPRESSION   1. Fall, initial encounter    2.  Laceration of left little finger without damage to nail, foreign body presence unspecified, initial encounter        PLAN:   Outpatient treatment, follow-up, and discharge instructions (see EMR)  Fahad Gill MD    This note was completed with a voice recognition program.            Fahad Gill MD  10/21/20 [id number]

## 2020-11-03 ENCOUNTER — HOSPITAL ENCOUNTER (OUTPATIENT)
Age: 70
Setting detail: SPECIMEN
Discharge: HOME | End: 2020-11-03
Payer: MEDICARE

## 2020-11-03 PROCEDURE — 87186 SC STD MICRODIL/AGAR DIL: CPT

## 2020-11-03 PROCEDURE — 86403 PARTICLE AGGLUT ANTBDY SCRN: CPT

## 2020-11-03 PROCEDURE — 87205 SMEAR GRAM STAIN: CPT

## 2020-11-03 PROCEDURE — 87070 CULTURE OTHR SPECIMN AEROBIC: CPT

## 2020-11-06 LAB
CULTURE: ABNORMAL
DIRECT EXAM: ABNORMAL
DIRECT EXAM: ABNORMAL
Lab: ABNORMAL
SPECIMEN DESCRIPTION: ABNORMAL

## 2020-11-09 ENCOUNTER — HOSPITAL ENCOUNTER (OUTPATIENT)
Dept: LAB | Age: 70
Setting detail: SPECIMEN
Discharge: HOME | End: 2020-11-09
Payer: MEDICARE

## 2020-11-09 PROCEDURE — C9803 HOPD COVID-19 SPEC COLLECT: HCPCS

## 2020-11-09 PROCEDURE — U0003 INFECTIOUS AGENT DETECTION BY NUCLEIC ACID (DNA OR RNA); SEVERE ACUTE RESPIRATORY SYNDROME CORONAVIRUS 2 (SARS-COV-2) (CORONAVIRUS DISEASE [COVID-19]), AMPLIFIED PROBE TECHNIQUE, MAKING USE OF HIGH THROUGHPUT TECHNOLOGIES AS DESCRIBED BY CMS-2020-01-R: HCPCS

## 2020-11-11 LAB — SARS-COV-2, NAA: DETECTED

## 2020-11-12 ENCOUNTER — TELEPHONE (OUTPATIENT)
Dept: ADMINISTRATIVE | Age: 70
End: 2020-11-12

## 2020-11-23 ENCOUNTER — OFFICE VISIT (OUTPATIENT)
Dept: PULMONOLOGY | Age: 70
End: 2020-11-23
Payer: MEDICARE

## 2020-11-23 ENCOUNTER — HOSPITAL ENCOUNTER (OUTPATIENT)
Age: 70
Discharge: HOME | End: 2020-11-25
Payer: MEDICARE

## 2020-11-23 ENCOUNTER — HOSPITAL ENCOUNTER (OUTPATIENT)
Dept: GENERAL RADIOLOGY | Age: 70
Discharge: HOME | End: 2020-11-25
Payer: MEDICARE

## 2020-11-23 VITALS
HEART RATE: 77 BPM | OXYGEN SATURATION: 96 % | DIASTOLIC BLOOD PRESSURE: 68 MMHG | BODY MASS INDEX: 22.52 KG/M2 | RESPIRATION RATE: 18 BRPM | SYSTOLIC BLOOD PRESSURE: 103 MMHG | WEIGHT: 127.1 LBS | HEIGHT: 63 IN | TEMPERATURE: 98.4 F

## 2020-11-23 PROCEDURE — G8926 SPIRO NO PERF OR DOC: HCPCS | Performed by: INTERNAL MEDICINE

## 2020-11-23 PROCEDURE — 1036F TOBACCO NON-USER: CPT | Performed by: INTERNAL MEDICINE

## 2020-11-23 PROCEDURE — 99214 OFFICE O/P EST MOD 30 MIN: CPT | Performed by: INTERNAL MEDICINE

## 2020-11-23 PROCEDURE — G8420 CALC BMI NORM PARAMETERS: HCPCS | Performed by: INTERNAL MEDICINE

## 2020-11-23 PROCEDURE — 3017F COLORECTAL CA SCREEN DOC REV: CPT | Performed by: INTERNAL MEDICINE

## 2020-11-23 PROCEDURE — G8484 FLU IMMUNIZE NO ADMIN: HCPCS | Performed by: INTERNAL MEDICINE

## 2020-11-23 PROCEDURE — 71046 X-RAY EXAM CHEST 2 VIEWS: CPT

## 2020-11-23 PROCEDURE — 3023F SPIROM DOC REV: CPT | Performed by: INTERNAL MEDICINE

## 2020-11-23 PROCEDURE — 1090F PRES/ABSN URINE INCON ASSESS: CPT | Performed by: INTERNAL MEDICINE

## 2020-11-23 PROCEDURE — 4040F PNEUMOC VAC/ADMIN/RCVD: CPT | Performed by: INTERNAL MEDICINE

## 2020-11-23 PROCEDURE — G8399 PT W/DXA RESULTS DOCUMENT: HCPCS | Performed by: INTERNAL MEDICINE

## 2020-11-23 PROCEDURE — 1123F ACP DISCUSS/DSCN MKR DOCD: CPT | Performed by: INTERNAL MEDICINE

## 2020-11-23 PROCEDURE — G8427 DOCREV CUR MEDS BY ELIG CLIN: HCPCS | Performed by: INTERNAL MEDICINE

## 2020-11-23 NOTE — PROGRESS NOTES
PULMONARY MEDICINE OUTPATIENT FOLLOW-UP NOTE     PATIENT:  Dottie Riggins  YOB: 1950  MRN: [id number]     REFERRED BY: ESTELLE Malik - CNP   CHIEF COMPLIANT: COPD, Shortness of Breath (increased with covid), and Concern For COVID-19 (positive Covid-2 weeks ago-dizzy lightheaded)       HISTORY     HISTORY OF PRESENT ILLNESS:   Dottie Riggins is a 79y.o. year old female here for follow-up. Chronic obstructive pulmonary disease:  Current symptoms: Patient reports that she was doing well from respiratory standpoint. However, she started having cough and shortness of breath and was diagnosed to have COVID-19 infection 2 weeks back. She reports some lightheadedness, denies any fever. She has dry cough and grade 2 exertional dyspnea. She denies any chest pain or wheezing  Tobacco history: Patient is a former smoker and  reports that she quit smoking about 20 years ago. Her smoking use included cigarettes. She has a 40.00 pack-year smoking history. She has never used smokeless tobacco.   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 Pulmonary function test: Showed combined obstructive/restrictive ventilatory impairment chest x-ray within normal limits. Imaging: Last CT (10/18) showed biapical scarring, no consolidation, mass or nodules. Chest x-ray was ordered in clinic and reported to show no acute changes  Current medications: She is currently on Breo Ellipta, Spiriva and as needed albuterol HFA/ aerosols. She also has significant coronary artery disease with multiple stents. PAST MEDICAL HISTORY:      Diagnosis Date    CAD (coronary artery disease) 4/11, 9/12    4 total stents. 2 Stents in RCA, 1 in small OM1.  H/O cardiovascular stress test 5/22/13, 6/25/14    Relatively normal without evidence of significant ischemia or infarction.   global LV systolic function was normal w/o regional wall motion abnormalities. No significant EKG evidence of ischemia during monitoring w/o significant associated arrhythmias. Tay score is 5.     H/O cardiovascular stress test 12/28/2017    Larglely normal myocardial perfusion imaging with soft tissue artifact but w/o evidence of significant myocardial ischemia or infarction. results are most consistent with low/intermediate risk for significant CAD    H/O echocardiogram 5/21/13, 6/24/14    EF>55%, mild concentric LVH    History of echocardiogram 11/27/2018    EF of 60%. evidence of moderate diastolic dysfunction is seen.  Hx of cardiac catheterization 08/09/2018    MIld CAD w/o any significant focal stenosis with a normal LV end diastolic pressure LVEDP interestingly the pt developed pretty pronounced ST depression on Lt main engagment with mod chest pressure which resolved o disengagement of the LT main    Hx of heart artery stent 10/06/2011    Lesion of Prox RCA 75% stenosis-Drug Eluting stent to RCA    Hx of percutaneous left heart catheterization 04/07/2011    LMCA, LAD & RAMUS- normal, LCX-lesion on Mid CX 40% stenosis, RCA-Patent stent, EF 60%    Hx of percutaneous left heart catheterization 04/11/2012    Normal renals, Patent RCA stents with mid LCX stenosis IVUS suggest 60-70% stenosis and due to continues ischemia, a LISA was inserted    Hx of percutaneous left heart catheterization 09/27/2012    LMCA & LAD -Normal, LCXMild luminal irregularities, Patent prior stent, RCA-Patent stents in Prox and mid segment    Hyperlipidemia     Normal nuclear stress test 06/06/2012     PAST SURGICAL HISTORY:      Procedure Laterality Date    CARDIAC CATHETERIZATION Left 08/09/2018    right radial/ Nimia Vimal/ Dr Yuan Duty significant coronary artery disease. Widely patent RCA stent.  However, the patient did have pretty severe ST depression on left main engagement with moderate chest pressure that resolved on disengagement    CARPAL TUNNEL RELEASE      CORONARY ANGIOPLASTY WITH STENT PLACEMENT      NECK SURGERY      disc replaced in neck     FAMILY HISTORY:      Problem Relation Age of Onset    Cancer Mother         breast cancer    Cancer Brother      SOCIAL HISTORY:  TOBACCO:   reports that she quit smoking about 20 years ago. Her smoking use included cigarettes. She has a 40.00 pack-year smoking history. She has never used smokeless tobacco.  ETOH:   reports current alcohol use. DRUGS: reports no history of drug use. AVOCATION/OCCUPATIONAL EXPOSURE:  None    IMMUNIZATION HISTORY:  Immunization History   Administered Date(s) Administered    Pneumococcal Conjugate 13-valent (Weqrodu59) 06/26/2019    Pneumococcal Polysaccharide ([id number]) 07/27/2020    Tdap (Boostrix, Adacel) 10/29/2020        ALLERGIES:  Allergies   Allergen Reactions    Benadryl [Diphenhydramine] Hives    Iv Dye [Iodides] Hives and Shortness Of Breath    Pcn [Penicillins]       MEDICATIONS:  Outpatient Medications Prior to Visit   Medication Sig Dispense Refill    Fluticasone furoate-vilanterol (BREO ELLIPTA) 200-25 MCG/INH AEPB inhaler Inhale 1 puff into the lungs daily      nitroGLYCERIN (NITROSTAT) 0.4 MG SL tablet DISSOLVE 1 TABLET UNDER THE TONGUE EVERY 5 MINUTES AS  NEEDED FOR CHEST PAIN. MAX  OF 3 TABLETS IN 15 MINUTES. CALL 911 IF PAIN PERSISTS.  75 tablet 4    pantoprazole (PROTONIX) 20 MG tablet TAKE 1 TABLET BY MOUTH  DAILY (Patient taking differently: Take 20 mg by mouth daily TAKE 1 TABLET BY MOUTH  DAILY) 90 tablet 3    tiotropium (SPIRIVA HANDIHALER) 18 MCG inhalation capsule Inhale 1 capsule into the lungs daily 30 capsule 3    amLODIPine (NORVASC) 5 MG tablet TAKE ONE-HALF TABLET BY MOUTH DAILY 45 tablet 2    albuterol sulfate  (90 Base) MCG/ACT inhaler Inhale 2 puffs into the lungs every 6 hours as needed for Wheezing 3 Inhaler 3    rOPINIRole (REQUIP) 0.5 MG tablet TAKE 2 TABLETS BY MOUTH  DAILY 180 tablet 3    atorvastatin Lab Results   Component Value Date    A1A 125 07/30/2020     CMP:   Lab Results   Component Value Date     (L) 08/25/2020    K 4.4 08/25/2020     08/25/2020    CO2 23 08/25/2020    BUN 17 08/25/2020    CREATININE 0.56 08/25/2020    GLUCOSE 104 (H) 08/25/2020    MG 2.4 06/05/2012    CALCIUM 9.7 08/25/2020    PROT 7.5 08/25/2020    LABALBU 4.4 08/25/2020    BILITOT 0.37 08/25/2020    ALT 23 08/25/2020    AST 20 08/25/2020    ALKPHOS 85 08/25/2020     Coagulation Profile:   Lab Results   Component Value Date    INR 0.9 07/14/2019    PROTIME 9.8 07/14/2019    APTT 26.3 06/04/2012     BNP:   No results found for: BNP, PROBNP  D-Dimer/Fibrinogen:  Lab Results   Component Value Date    DDIMER 0.26 12/11/2014     Others labs:  Lab Results   Component Value Date    TSH 2.86 08/25/2020     Lab Results   Component Value Date    SEDRATE 19 06/26/2019    CRP 4.1 06/26/2019     Lab Results   Component Value Date    FOLATE 14.6 08/25/2020     No results found for: SPEP, UPEP, PSA, CEA, , [id number],   No results found for: RPR, HIV    RADIOLOGY:  Chest x-ray: 6/3/2020  FINDINGS:    The lungs are without acute focal process.  There is no effusion or    pneumothorax. The cardiomediastinal silhouette is stable. The osseous    structures are stable.              Impression    No acute process. CT chest: 10/29/2018  FINDINGS:    Mediastinum: Visualized thyroid parenchyma appears unremarkable.  Thoracic    aorta demonstrates minimal calcification without aneurysm.  Pulmonary trunk    appears nondilated.  Heart appears normal in size without pericardial    effusion.  No lymphadenopathy.  The esophagus is grossly unremarkable.         Lungs/pleura: No focal lung consolidation, pneumothorax or pleural effusion.     Mild biapical scarring.  Trachea and distal airways appear patent.  No    suspicious noncalcified lung nodule or mass.         Upper Abdomen: No acute findings.  Visualized adrenal glands appear grossly unremarkable.         Soft Tissues/Bones: Visualized soft tissues surrounding the chest wall    demonstrate no acute findings.  Osseous structures demonstrate degenerative    changes.              Impression    No acute cardiopulmonary process. PULMONARY FUNCTION TEST:06/23/2020     The patient's spirometry shows a flow volume loop which is restricted. FEV1 is 47% of predicted. There was 21% bronchodilator change from 1.04  liters to 1.26 liters.     Forced vital capacity 1.24 liters, 49% of predicted with 22%  bronchodilator change to 60% of predicted.     The patient's FEV1/FVC ratio 72.     Lung volumes by body box showed total lung capacity 148% of predicted. % of predicted consistent with air trapping.     Diffusion capacity 41% of predicted uncorrected, corrected for alveolar  volume 76% of predicted and airway resistance is increased.     FINAL IMPRESSION:  The study shows a combined obstructive and  restrictive dysfunction. The ventilatory dysfunction is of moderate  severity with a significant bronchodilator response. Clinical  correlation advised. ECHOCARDIOGRAM:   Results for orders placed during the hospital encounter of 11/27/18   Echo 2D w doppler w color complete    Narrative   Summary  Global left ventricular systolic function appears preserved with an  estimated ejection fraction of 60%. Normal left ventricular wall thickness with a normal left ventricular cavity  size. Normal aortic valve structure with moderate aortic regurgitation. Evidence of moderate diastolic dysfunction is seen. Compared to the previous study of 12/12/14, the patients aortic  regurgitation has advanced from mild to more moderate aortic regurgitation and now has evidence of diastolic dysfunction. CARDIAC CATHETERIZATION: 8/9/2018   Procedure Summary      Mild coronary artery disease without any significant focal stenosis with a   normal left ventricular end diastolic pressure (LVEDP). Interestingly, the patient developed pretty pronounced ST depression on   left main engagement with moderate chest pressure which resolved on   disengagement of the left main. Recommendations      Proceed with risk factor modification as clinically indicated. Consider additional workup to evaluate for other etiologies of the patient   symptoms of shortness of breath and fatigue if clinically indicated. Although the patient did develop ECG changes and chest pressure during the   procedure and this would be suggestive of small vessel coronary artery   disease, I do not think that this is likely related to the patients   symptoms of shortness of breath. ASSESSMENT AND PLAN     ASSESSMENT:    ICD-10-CM    1. SOB (shortness of breath)  R06.02 XR CHEST (2 VW)   2. Stage 2 moderate COPD by GOLD classification ([facility])  J44.9 XR CHEST (2 VW)   3. Former moderate cigarette smoker (10-19 per day)  Z87.891    4. 2019 novel coronavirus disease (COVID-19)  U07.1      PLAN/RECOMMENDATIONS:     Pulmonary function tests reviewed   CT chest reviewed, ordered chest x-ray, does not show any acute changes   Alpha-1 antitrypsin phenotype M2M2, normal enzymes level   Patient is currently on on Symbicort and as needed DuoNeb aerosols/albuterol HFA   Continue Breo Ellipta, Spiriva and as needed albuterol HFA/nebulizer   Recommended to continue supportive treatment for recent COVID-19 infection   Reviewed use of rescue versus controlling agents and potential side effects   Refills were provided    Barriers to medication compliance addressed.    Patient was recommended to have prednisone and an antibiotic available for use during an exacerbation   Patient received counseling on the following healthy behaviors: nutrition, exercise and medication adherence   Maintain an active lifestyle   Recommended to continue smoking cessation  Lung Cancer Screening: After reviewing the patient's smoking history, age and other clinical criteria, the LOW DOSE CT is : NOT INDICATED; Quit smoking >15 years ago   Recommend influenza vaccination in the fall annually; Up-to-date   Recommendations were given regarding pneumococcal vaccination; Up-to-date    All the questions that the patient had were answered to her satisfaction  We'll see the patient back in 2 months  Thank you for having us involved in the care of your patient. Please call us if you have any questions or concerns. Lyly Manning MD    Pulmonary and Critical Care Medicine            Please note that this chart was generated using voice recognition Dragon dictation software. Although every effort was made to ensure the accuracy of this automated transcription, some errors in transcription may have occurred.

## 2020-11-23 NOTE — PATIENT INSTRUCTIONS
SURVEY:    You may be receiving a survey from [employer] regarding your visit today. Please complete the survey to enable us to provide the highest quality of care to you and your family. If you cannot score us a very good on any question, please call the office to discuss how we could have made your experience a very good one. Thank you. Patient Education        Learning About Coronavirus [phone])  Coronavirus [phone]): Overview  What is coronavirus ([id number])? The coronavirus disease (COVID-19) is caused by a virus. It is an illness that was first found in December 2019. It has since spread worldwide. The virus can cause fever, cough, and trouble breathing. In severe cases, it can cause pneumonia and make it hard to breathe without help. It can cause death. This virus spreads person-to-person through droplets from coughing and sneezing. It can also spread when you are close to someone who is infected. And it can spread when you touch something that has the virus on it, such as a doorknob or a tabletop. Coronaviruses are a large group of viruses. They cause the common cold. They also cause more serious illnesses like Middle East respiratory syndrome (MERS) and severe acute respiratory syndrome (SARS). COVID-19 is caused by a novel coronavirus. That means it's a new type that has not been seen in people before. How is COVID-19 treated? Mild illness can be treated at home, but more serious illness needs to be treated in the hospital. Treatment may include medicines to reduce symptoms, plus breathing support such as oxygen therapy or a ventilator. Other treatments, such as antiviral medicines, may help people who have COVID-19. What can you do to protect yourself from COVID-19? The best way to protect yourself from getting sick is to:  · Avoid areas where there is an outbreak. · Avoid contact with people who may be infected.   · Avoid crowds and try to stay at least 6 feet away from other people. · Wash your hands often, especially after you cough or sneeze. Use soap and water, and scrub for at least 20 seconds. If soap and water aren't available, use an alcohol-based hand . · Avoid touching your mouth, nose, and eyes. What can you do to avoid spreading the virus to others? To help avoid spreading the virus to others:  · Wash your hands often with soap or alcohol-based hand sanitizers. · Cover your mouth with a tissue when you cough or sneeze. Then throw the tissue in the trash. · Use a disinfectant to clean things that you touch often. These include doorknobs, remote controls, phones, and handles on your refrigerator and microwave. And don't forget countertops, tabletops, bathrooms, and computer keyboards. · Wear a cloth face cover if you have to go to public areas. If you know or suspect that you have COVID-19:  · Stay home. Don't go to school, work, or public areas. And don't use public transportation, ride-shares, or taxis unless you have no choice. · Leave your home only if you need to get medical care or testing. But call the doctor's office first so they know you're coming. And wear a face cover. · Limit contact with people in your home. If possible, stay in a separate bedroom and use a separate bathroom. · Wear a face cover whenever you're around other people. It can help stop the spread of the virus when you cough or sneeze. · Clean and disinfect your home every day. Use household  and disinfectant wipes or sprays. Take special care to clean things that you grab with your hands. · Self-isolate until it's safe to be around others again. ? If you have symptoms, it's safe when you haven't had a fever for 3 days and your symptoms have improved and it's been at least 10 days since your symptoms started. ? If you were exposed to the virus but don't have symptoms, it's safe to be around others 14 days after exposure.   ? Talk to your doctor about whether you also need

## 2020-12-14 ENCOUNTER — OFFICE VISIT (OUTPATIENT)
Dept: CARDIOLOGY | Age: 70
End: 2020-12-14
Payer: MEDICARE

## 2020-12-14 VITALS
BODY MASS INDEX: 22.82 KG/M2 | HEIGHT: 63 IN | RESPIRATION RATE: 18 BRPM | SYSTOLIC BLOOD PRESSURE: 134 MMHG | DIASTOLIC BLOOD PRESSURE: 84 MMHG | WEIGHT: 128.8 LBS | HEART RATE: 48 BPM | OXYGEN SATURATION: 98 %

## 2020-12-14 PROBLEM — R07.89 NON-CARDIAC CHEST PAIN: Status: ACTIVE | Noted: 2020-12-14

## 2020-12-14 PROCEDURE — 1036F TOBACCO NON-USER: CPT | Performed by: FAMILY MEDICINE

## 2020-12-14 PROCEDURE — G8427 DOCREV CUR MEDS BY ELIG CLIN: HCPCS | Performed by: FAMILY MEDICINE

## 2020-12-14 PROCEDURE — G8484 FLU IMMUNIZE NO ADMIN: HCPCS | Performed by: FAMILY MEDICINE

## 2020-12-14 PROCEDURE — 3017F COLORECTAL CA SCREEN DOC REV: CPT | Performed by: FAMILY MEDICINE

## 2020-12-14 PROCEDURE — 99211 OFF/OP EST MAY X REQ PHY/QHP: CPT | Performed by: FAMILY MEDICINE

## 2020-12-14 PROCEDURE — 1123F ACP DISCUSS/DSCN MKR DOCD: CPT | Performed by: FAMILY MEDICINE

## 2020-12-14 PROCEDURE — G8399 PT W/DXA RESULTS DOCUMENT: HCPCS | Performed by: FAMILY MEDICINE

## 2020-12-14 PROCEDURE — 93005 ELECTROCARDIOGRAM TRACING: CPT | Performed by: FAMILY MEDICINE

## 2020-12-14 PROCEDURE — 1090F PRES/ABSN URINE INCON ASSESS: CPT | Performed by: FAMILY MEDICINE

## 2020-12-14 PROCEDURE — 99214 OFFICE O/P EST MOD 30 MIN: CPT | Performed by: FAMILY MEDICINE

## 2020-12-14 PROCEDURE — 4040F PNEUMOC VAC/ADMIN/RCVD: CPT | Performed by: FAMILY MEDICINE

## 2020-12-14 PROCEDURE — 93010 ELECTROCARDIOGRAM REPORT: CPT | Performed by: FAMILY MEDICINE

## 2020-12-14 PROCEDURE — G8420 CALC BMI NORM PARAMETERS: HCPCS | Performed by: FAMILY MEDICINE

## 2020-12-14 NOTE — PATIENT INSTRUCTIONS
SURVEY:    You may be receiving a survey from [employer] regarding your visit today. Please complete the survey to enable us to provide the highest quality of care to you and your family. If you cannot score us a very good on any question, please call the office to discuss how we could have made your experience a very good one. Thank you.

## 2020-12-18 ENCOUNTER — HOSPITAL ENCOUNTER (OUTPATIENT)
Dept: NON INVASIVE DIAGNOSTICS | Age: 70
Discharge: HOME | End: 2020-12-18
Payer: MEDICARE

## 2020-12-18 LAB
LV EF: 60 %
LVEF MODALITY: NORMAL

## 2020-12-18 PROCEDURE — 93306 TTE W/DOPPLER COMPLETE: CPT

## 2020-12-21 ENCOUNTER — TELEPHONE (OUTPATIENT)
Dept: CARDIOLOGY | Age: 70
End: 2020-12-21

## 2020-12-21 NOTE — TELEPHONE ENCOUNTER
----- Message from Cliff Selby MD sent at 12/20/2020 11:43 AM EST -----  Let Ms. Speaker know their test result was ok. Thanks.

## 2020-12-24 RX ORDER — TIOTROPIUM BROMIDE 18 UG/1
CAPSULE ORAL; RESPIRATORY (INHALATION)
Qty: 30 CAPSULE | Refills: 3 | Status: SHIPPED | OUTPATIENT
Start: 2020-12-24 | End: 2021-02-11 | Stop reason: SDUPTHER

## 2020-12-28 RX ORDER — ALBUTEROL SULFATE 90 UG/1
2 AEROSOL, METERED RESPIRATORY (INHALATION) EVERY 6 HOURS PRN
Qty: 3 INHALER | Refills: 3 | Status: SHIPPED | OUTPATIENT
Start: 2020-12-28 | End: 2021-04-26 | Stop reason: SDUPTHER

## 2020-12-29 RX ORDER — ALBUTEROL SULFATE 90 UG/1
2 AEROSOL, METERED RESPIRATORY (INHALATION) EVERY 6 HOURS PRN
Qty: 3 INHALER | Refills: 3 | Status: SHIPPED | OUTPATIENT
Start: 2020-12-29 | End: 2021-02-11 | Stop reason: SDUPTHER

## 2021-02-18 ENCOUNTER — TELEPHONE (OUTPATIENT)
Dept: PULMONOLOGY | Age: 71
End: 2021-02-18

## 2021-02-18 DIAGNOSIS — J44.9 STAGE 2 MODERATE COPD BY GOLD CLASSIFICATION (HCC): Primary | ICD-10-CM

## 2021-02-18 NOTE — TELEPHONE ENCOUNTER
Patient called and reported Yolis Seaman is no longer covered with her [address] plan they indicated [address]. Please advise.

## 2021-04-24 NOTE — PROGRESS NOTES
Subjective:      Patient ID: Uche Carrasco is a 79 y.o. female. HPI  Here for follow-up for COPD. Patient was last seen in the office in November 2020 per Dr. Bao Salcedo. History of Covid November 2020. Patient reports that her breathing is much better, denies any significant shortness of breath or cough. She is a former smoker who quit in the year 2000 with a 40-pack-year history. No longer eligible for CT lung screening due to smoking cessation date. Patient reports that her insurance is no longer covering the [employer] and she was switched over to [employer], continues on Spiriva and albuterol HFA. Asking for refills today. Patient has not obtained her Covid vaccine, hesitant due to history of allergic reactions to other medications in the past.  Patient educated and encouraged to obtain, patient instructed to obtain at a hospital vaccine clinic if she has concern for allergic reaction. Patient not persuaded. Medications:   Wixela 250-50 mcg: Albuterol HFA:  Spiriva HandiHaler:      PRIOR WORKUP:  PFT:  PFT 6/23/2020: Spirometry with a restrictive flow volume loop. FEV1 47% of predicted with a 21% bronchodilator change. FVC 49% of predicted with a 22% bronchodilator change. FEV1/FVC ratio 72. Lung volumes by body box show total lung capacity 148% of predicted, % of predicted consistent with air trapping. Diffusion capacity when corrected for alveolar volume is 76% of predicted airway resistance is increased. Final impression: Combined obstructive and restrictive dysfunction. Moderate in severity with a significant bronchodilator response. CT Imaging:  CT chest 10/29/2018: Negative for focal lung consolidation, pneumothorax or pleural effusion. No suspicious noncalcified lung nodule or mass. CT chest 3/24/2015: Negative for focal consolidation or pleural effusion. Few small scattered parenchymal opacities throughout both lungs which may be infectious or inflammatory in nature.   No bronchiectasis or significant interstitial thickening. Mild underlying centrilobular emphysema with no mediastinal or hilar lymphadenopathy. Sleep Study:  Apnea link 6/28/2018: AHI of 2.7. Lowest desaturation was 90%. Laboratory Evaluation:  Alpha 1 antitrypsin with phenotype 7/30/2020: 125, M2 M2      Immunizations:   Immunization History   Administered Date(s) Administered    Pneumococcal Conjugate 13-valent ([id number]) 06/26/2019    Pneumococcal Polysaccharide ([id number]) 07/27/2020    Tdap (Boostrix, Adacel) 10/29/2020        No flowsheet data found. BP (!) 162/76   Pulse 74   Temp 98.3 °F (36.8 °C)   Resp 16   Ht 5' 3\" (1.6 m)   Wt 133 lb (60.3 kg)   SpO2 99%   BMI 23.56 kg/m²     Past Medical History:   Diagnosis Date    CAD (coronary artery disease) 4/11, 9/12    4 total stents. 2 Stents in RCA, 1 in small OM1.  H/O cardiovascular stress test 5/22/13, 6/25/14    Relatively normal without evidence of significant ischemia or infarction. global LV systolic function was normal w/o regional wall motion abnormalities. No significant EKG evidence of ischemia during monitoring w/o significant associated arrhythmias. Tay score is 5.     H/O cardiovascular stress test 12/28/2017    Larglely normal myocardial perfusion imaging with soft tissue artifact but w/o evidence of significant myocardial ischemia or infarction. results are most consistent with low/intermediate risk for significant CAD    H/O echocardiogram 5/21/13, 6/24/14    EF>55%, mild concentric LVH    History of echocardiogram 11/27/2018    EF of 60%. evidence of moderate diastolic dysfunction is seen.      Hx of cardiac catheterization 08/09/2018    MIld CAD w/o any significant focal stenosis with a normal LV end diastolic pressure LVEDP interestingly the pt developed pretty pronounced ST depression on Lt main engagment with mod chest pressure which resolved o disengagement of the LT main    Hx of heart artery stent Activity    Alcohol use: Yes     Comment: social    Drug use: No    Sexual activity: Not Currently   Lifestyle    Physical activity     Days per week: Not on file     Minutes per session: Not on file    Stress: Not on file   Relationships    Social connections     Talks on phone: Not on file     Gets together: Not on file     Attends Voodoo service: Not on file     Active member of club or organization: Not on file     Attends meetings of clubs or organizations: Not on file     Relationship status: Not on file    Intimate partner violence     Fear of current or ex partner: Not on file     Emotionally abused: Not on file     Physically abused: Not on file     Forced sexual activity: Not on file   Other Topics Concern    Not on file   Social History Narrative    Not on file       Review of Systems   Constitutional: Negative for fatigue and fever. HENT: Negative for postnasal drip, rhinorrhea, sinus pressure and sinus pain. Respiratory: Negative for cough, chest tightness, shortness of breath, wheezing and stridor. Cardiovascular: Negative for chest pain and leg swelling. Gastrointestinal: Negative for constipation, diarrhea, nausea and vomiting. Genitourinary: Negative for dysuria, frequency and urgency. Musculoskeletal: Negative for arthralgias, joint swelling and myalgias. Skin: Negative for rash. Neurological: Negative for dizziness, speech difficulty and headaches. Hematological: Does not bruise/bleed easily. Psychiatric/Behavioral: Negative for agitation, hallucinations, sleep disturbance and suicidal ideas.        Objective:       Physical Exam  General appearance - alert, well appearing, and in no distress, oriented to person, place, and time and normal appearing weight  Mental status - alert, oriented to person, place, and time, normal mood, behavior, speech, dress, motor activity, and thought processes  Eyes - pupils equal and reactive, extraocular eye movements intact  Ears -

## 2021-04-26 ENCOUNTER — OFFICE VISIT (OUTPATIENT)
Dept: PULMONOLOGY | Age: 71
End: 2021-04-26
Payer: MEDICARE

## 2021-04-26 VITALS
WEIGHT: 133 LBS | BODY MASS INDEX: 23.57 KG/M2 | TEMPERATURE: 98.3 F | DIASTOLIC BLOOD PRESSURE: 76 MMHG | HEIGHT: 63 IN | SYSTOLIC BLOOD PRESSURE: 162 MMHG | HEART RATE: 74 BPM | RESPIRATION RATE: 16 BRPM | OXYGEN SATURATION: 99 %

## 2021-04-26 DIAGNOSIS — Z28.21 COVID-19 VIRUS VACCINATION REFUSED: ICD-10-CM

## 2021-04-26 DIAGNOSIS — U07.1 2019 NOVEL CORONAVIRUS DISEASE (COVID-19): Primary | ICD-10-CM

## 2021-04-26 DIAGNOSIS — Z87.891 FORMER HEAVY TOBACCO SMOKER: ICD-10-CM

## 2021-04-26 DIAGNOSIS — J44.9 STAGE 2 MODERATE COPD BY GOLD CLASSIFICATION (HCC): ICD-10-CM

## 2021-04-26 PROCEDURE — 3017F COLORECTAL CA SCREEN DOC REV: CPT | Performed by: NURSE PRACTITIONER

## 2021-04-26 PROCEDURE — G8427 DOCREV CUR MEDS BY ELIG CLIN: HCPCS | Performed by: NURSE PRACTITIONER

## 2021-04-26 PROCEDURE — G8420 CALC BMI NORM PARAMETERS: HCPCS | Performed by: NURSE PRACTITIONER

## 2021-04-26 PROCEDURE — 99214 OFFICE O/P EST MOD 30 MIN: CPT | Performed by: NURSE PRACTITIONER

## 2021-04-26 PROCEDURE — 1036F TOBACCO NON-USER: CPT | Performed by: NURSE PRACTITIONER

## 2021-04-26 PROCEDURE — 99213 OFFICE O/P EST LOW 20 MIN: CPT | Performed by: NURSE PRACTITIONER

## 2021-04-26 PROCEDURE — 1123F ACP DISCUSS/DSCN MKR DOCD: CPT | Performed by: NURSE PRACTITIONER

## 2021-04-26 PROCEDURE — G8399 PT W/DXA RESULTS DOCUMENT: HCPCS | Performed by: NURSE PRACTITIONER

## 2021-04-26 PROCEDURE — 1090F PRES/ABSN URINE INCON ASSESS: CPT | Performed by: NURSE PRACTITIONER

## 2021-04-26 PROCEDURE — 3023F SPIROM DOC REV: CPT | Performed by: NURSE PRACTITIONER

## 2021-04-26 PROCEDURE — 4040F PNEUMOC VAC/ADMIN/RCVD: CPT | Performed by: NURSE PRACTITIONER

## 2021-04-26 PROCEDURE — G8926 SPIRO NO PERF OR DOC: HCPCS | Performed by: NURSE PRACTITIONER

## 2021-04-26 RX ORDER — ALBUTEROL SULFATE 90 UG/1
2 AEROSOL, METERED RESPIRATORY (INHALATION) EVERY 6 HOURS PRN
Qty: 3 INHALER | Refills: 3 | Status: ON HOLD | OUTPATIENT
Start: 2021-04-26 | End: 2021-08-05

## 2021-04-26 RX ORDER — TIOTROPIUM BROMIDE 18 UG/1
CAPSULE ORAL; RESPIRATORY (INHALATION)
Qty: 90 CAPSULE | Refills: 3 | Status: SHIPPED | OUTPATIENT
Start: 2021-04-26

## 2021-04-26 ASSESSMENT — ENCOUNTER SYMPTOMS
CONSTIPATION: 0
SHORTNESS OF BREATH: 0
COUGH: 0
RHINORRHEA: 0
NAUSEA: 0
VOMITING: 0
SINUS PAIN: 0
WHEEZING: 0
DIARRHEA: 0
SINUS PRESSURE: 0
CHEST TIGHTNESS: 0
STRIDOR: 0

## 2021-04-26 NOTE — PATIENT INSTRUCTIONS
SURVEY:    You may be receiving a survey from [employer] regarding your visit today. Please complete the survey to enable us to provide the highest quality of care to you and your family. If you cannot score us a very good on any question, please call the office to discuss how we could have made your experience a very good one. Thank you. Please recheck your blood pressure when you go home and make sure you take your prescribed medication if applicable . Please follow up with your PCP or ER if needed.

## 2021-04-28 ENCOUNTER — HOSPITAL ENCOUNTER (OUTPATIENT)
Age: 71
Discharge: HOME | End: 2021-04-28
Payer: MEDICARE

## 2021-04-28 LAB
ABSOLUTE EOS #: 0.13 K/UL (ref 0–0.44)
ABSOLUTE IMMATURE GRANULOCYTE: <0.03 K/UL (ref 0–0.3)
ABSOLUTE LYMPH #: 1.29 K/UL (ref 1.1–3.7)
ABSOLUTE MONO #: 0.86 K/UL (ref 0.1–1.2)
ANION GAP SERPL CALCULATED.3IONS-SCNC: 11 MMOL/L (ref 9–17)
BASOPHILS # BLD: 1 % (ref 0–2)
BASOPHILS ABSOLUTE: 0.03 K/UL (ref 0–0.2)
BUN BLDV-MCNC: 15 MG/DL (ref 8–23)
BUN/CREAT BLD: 33 (ref 9–20)
C-REACTIVE PROTEIN: 65.1 MG/L (ref 0–5)
CALCIUM SERPL-MCNC: 9.9 MG/DL (ref 8.6–10.4)
CHLORIDE BLD-SCNC: 101 MMOL/L (ref 98–107)
CO2: 24 MMOL/L (ref 20–31)
CREAT SERPL-MCNC: 0.46 MG/DL (ref 0.5–0.9)
DIFFERENTIAL TYPE: ABNORMAL
EOSINOPHILS RELATIVE PERCENT: 2 % (ref 1–4)
GFR AFRICAN AMERICAN: >60 ML/MIN
GFR NON-AFRICAN AMERICAN: >60 ML/MIN
GFR SERPL CREATININE-BSD FRML MDRD: ABNORMAL ML/MIN/{1.73_M2}
GFR SERPL CREATININE-BSD FRML MDRD: ABNORMAL ML/MIN/{1.73_M2}
GLUCOSE BLD-MCNC: 87 MG/DL (ref 70–99)
HCT VFR BLD CALC: 38 % (ref 36.3–47.1)
HEMOGLOBIN: 12.2 G/DL (ref 11.9–15.1)
IMMATURE GRANULOCYTES: 0 %
LYMPHOCYTES # BLD: 24 % (ref 24–43)
MCH RBC QN AUTO: 31.3 PG (ref 25.2–33.5)
MCHC RBC AUTO-ENTMCNC: 32.1 G/DL (ref 28.4–34.8)
MCV RBC AUTO: 97.4 FL (ref 82.6–102.9)
MONOCYTES # BLD: 16 % (ref 3–12)
NRBC AUTOMATED: 0 PER 100 WBC
PDW BLD-RTO: 12.6 % (ref 11.8–14.4)
PLATELET # BLD: 260 K/UL (ref 138–453)
PLATELET ESTIMATE: ABNORMAL
PMV BLD AUTO: 8.6 FL (ref 8.1–13.5)
POTASSIUM SERPL-SCNC: 4.2 MMOL/L (ref 3.7–5.3)
RBC # BLD: 3.9 M/UL (ref 3.95–5.11)
RBC # BLD: ABNORMAL 10*6/UL
SEDIMENTATION RATE, ERYTHROCYTE: 74 MM (ref 0–20)
SEG NEUTROPHILS: 57 % (ref 36–65)
SEGMENTED NEUTROPHILS ABSOLUTE COUNT: 3.15 K/UL (ref 1.5–8.1)
SODIUM BLD-SCNC: 136 MMOL/L (ref 135–144)
WBC # BLD: 5.5 K/UL (ref 3.5–11.3)
WBC # BLD: ABNORMAL 10*3/UL

## 2021-04-28 PROCEDURE — 36415 COLL VENOUS BLD VENIPUNCTURE: CPT

## 2021-04-28 PROCEDURE — 85025 COMPLETE CBC W/AUTO DIFF WBC: CPT

## 2021-04-28 PROCEDURE — 80048 BASIC METABOLIC PNL TOTAL CA: CPT

## 2021-04-28 PROCEDURE — 86038 ANTINUCLEAR ANTIBODIES: CPT

## 2021-04-28 PROCEDURE — 86431 RHEUMATOID FACTOR QUANT: CPT

## 2021-04-28 PROCEDURE — 86140 C-REACTIVE PROTEIN: CPT

## 2021-04-28 PROCEDURE — 85652 RBC SED RATE AUTOMATED: CPT

## 2021-04-29 LAB
ANTI-NUCLEAR ANTIBODY (ANA): NEGATIVE
RHEUMATOID FACTOR: <10 IU/ML

## 2021-06-14 ENCOUNTER — TELEPHONE (OUTPATIENT)
Dept: PULMONOLOGY | Age: 71
End: 2021-06-14

## 2021-06-14 NOTE — TELEPHONE ENCOUNTER
Patients Insurance will only cover [employer] for albuterol inhaler . Please redo.     Done- [place]

## 2021-06-15 RX ORDER — ALBUTEROL SULFATE 90 UG/1
2 AEROSOL, METERED RESPIRATORY (INHALATION) EVERY 6 HOURS PRN
Qty: 1 INHALER | Refills: 3 | Status: SHIPPED | OUTPATIENT
Start: 2021-06-15 | End: 2021-10-25

## 2021-07-01 ENCOUNTER — HOSPITAL ENCOUNTER (OUTPATIENT)
Age: 71
Discharge: HOME | End: 2021-07-01
Payer: MEDICARE

## 2021-07-01 LAB
ABSOLUTE EOS #: 0.04 K/UL (ref 0–0.44)
ABSOLUTE IMMATURE GRANULOCYTE: 0.03 K/UL (ref 0–0.3)
ABSOLUTE LYMPH #: 1.01 K/UL (ref 1.1–3.7)
ABSOLUTE MONO #: 0.41 K/UL (ref 0.1–1.2)
ALT SERPL-CCNC: 22 U/L (ref 5–33)
AST SERPL-CCNC: 22 U/L
BASOPHILS # BLD: 0 % (ref 0–2)
BASOPHILS ABSOLUTE: 0.03 K/UL (ref 0–0.2)
CREAT SERPL-MCNC: 0.45 MG/DL (ref 0.5–0.9)
DIFFERENTIAL TYPE: ABNORMAL
EOSINOPHILS RELATIVE PERCENT: 1 % (ref 1–4)
GFR AFRICAN AMERICAN: >60 ML/MIN
GFR NON-AFRICAN AMERICAN: >60 ML/MIN
GFR SERPL CREATININE-BSD FRML MDRD: ABNORMAL ML/MIN/{1.73_M2}
GFR SERPL CREATININE-BSD FRML MDRD: ABNORMAL ML/MIN/{1.73_M2}
HCT VFR BLD CALC: 44.3 % (ref 36.3–47.1)
HEMOGLOBIN: 13.9 G/DL (ref 11.9–15.1)
IMMATURE GRANULOCYTES: 0 %
LYMPHOCYTES # BLD: 14 % (ref 24–43)
MCH RBC QN AUTO: 31 PG (ref 25.2–33.5)
MCHC RBC AUTO-ENTMCNC: 31.4 G/DL (ref 28.4–34.8)
MCV RBC AUTO: 98.9 FL (ref 82.6–102.9)
MONOCYTES # BLD: 6 % (ref 3–12)
NRBC AUTOMATED: 0 PER 100 WBC
PDW BLD-RTO: 13.7 % (ref 11.8–14.4)
PLATELET # BLD: 256 K/UL (ref 138–453)
PLATELET ESTIMATE: ABNORMAL
PMV BLD AUTO: 9.1 FL (ref 8.1–13.5)
RBC # BLD: 4.48 M/UL (ref 3.95–5.11)
RBC # BLD: ABNORMAL 10*6/UL
SEG NEUTROPHILS: 79 % (ref 36–65)
SEGMENTED NEUTROPHILS ABSOLUTE COUNT: 5.67 K/UL (ref 1.5–8.1)
WBC # BLD: 7.2 K/UL (ref 3.5–11.3)
WBC # BLD: ABNORMAL 10*3/UL

## 2021-07-01 PROCEDURE — 84460 ALANINE AMINO (ALT) (SGPT): CPT

## 2021-07-01 PROCEDURE — 82565 ASSAY OF CREATININE: CPT

## 2021-07-01 PROCEDURE — 36415 COLL VENOUS BLD VENIPUNCTURE: CPT

## 2021-07-01 PROCEDURE — 84450 TRANSFERASE (AST) (SGOT): CPT

## 2021-07-01 PROCEDURE — 85025 COMPLETE CBC W/AUTO DIFF WBC: CPT

## 2021-07-20 ENCOUNTER — HOSPITAL ENCOUNTER (OUTPATIENT)
Dept: WOMENS IMAGING | Age: 71
Discharge: HOME | End: 2021-07-22
Payer: MEDICARE

## 2021-07-20 DIAGNOSIS — Z12.31 BREAST CANCER SCREENING BY MAMMOGRAM: ICD-10-CM

## 2021-07-20 PROBLEM — I20.1 CORONARY VASOSPASM (HCC): Status: RESOLVED | Noted: 2018-11-12 | Resolved: 2021-07-20

## 2021-07-20 PROCEDURE — 77063 BREAST TOMOSYNTHESIS BI: CPT

## 2021-08-05 ENCOUNTER — HOSPITAL ENCOUNTER (OUTPATIENT)
Age: 71
Setting detail: OBSERVATION
Discharge: HOME | End: 2021-08-06
Attending: EMERGENCY MEDICINE | Admitting: INTERNAL MEDICINE
Payer: MEDICARE

## 2021-08-05 ENCOUNTER — APPOINTMENT (OUTPATIENT)
Dept: GENERAL RADIOLOGY | Age: 71
End: 2021-08-05
Payer: MEDICARE

## 2021-08-05 DIAGNOSIS — I20.0 UNSTABLE ANGINA (HCC): Primary | ICD-10-CM

## 2021-08-05 PROBLEM — I24.9 ACS (ACUTE CORONARY SYNDROME) (HCC): Status: ACTIVE | Noted: 2021-08-05

## 2021-08-05 LAB
ABSOLUTE EOS #: 0.15 K/UL (ref 0–0.44)
ABSOLUTE IMMATURE GRANULOCYTE: 0.03 K/UL (ref 0–0.3)
ABSOLUTE LYMPH #: 2.19 K/UL (ref 1.1–3.7)
ABSOLUTE MONO #: 0.64 K/UL (ref 0.1–1.2)
ANION GAP SERPL CALCULATED.3IONS-SCNC: 13 MMOL/L (ref 9–17)
BASOPHILS # BLD: 1 % (ref 0–2)
BASOPHILS ABSOLUTE: 0.04 K/UL (ref 0–0.2)
BUN BLDV-MCNC: 12 MG/DL (ref 8–23)
BUN/CREAT BLD: 24 (ref 9–20)
CALCIUM SERPL-MCNC: 8.9 MG/DL (ref 8.6–10.4)
CHLORIDE BLD-SCNC: 108 MMOL/L (ref 98–107)
CO2: 23 MMOL/L (ref 20–31)
CREAT SERPL-MCNC: 0.5 MG/DL (ref 0.5–0.9)
DIFFERENTIAL TYPE: ABNORMAL
EOSINOPHILS RELATIVE PERCENT: 3 % (ref 1–4)
GFR AFRICAN AMERICAN: >60 ML/MIN
GFR NON-AFRICAN AMERICAN: >60 ML/MIN
GFR SERPL CREATININE-BSD FRML MDRD: ABNORMAL ML/MIN/{1.73_M2}
GFR SERPL CREATININE-BSD FRML MDRD: ABNORMAL ML/MIN/{1.73_M2}
GLUCOSE BLD-MCNC: 81 MG/DL (ref 70–99)
HCT VFR BLD CALC: 43 % (ref 36.3–47.1)
HEMOGLOBIN: 13.8 G/DL (ref 11.9–15.1)
IMMATURE GRANULOCYTES: 1 %
LYMPHOCYTES # BLD: 39 % (ref 24–43)
MCH RBC QN AUTO: 31.3 PG (ref 25.2–33.5)
MCHC RBC AUTO-ENTMCNC: 32.1 G/DL (ref 28.4–34.8)
MCV RBC AUTO: 97.5 FL (ref 82.6–102.9)
MONOCYTES # BLD: 12 % (ref 3–12)
NRBC AUTOMATED: 0 PER 100 WBC
PARTIAL THROMBOPLASTIN TIME: 20.2 SEC (ref 23.9–33.8)
PDW BLD-RTO: 13.8 % (ref 11.8–14.4)
PLATELET # BLD: 237 K/UL (ref 138–453)
PLATELET ESTIMATE: ABNORMAL
PMV BLD AUTO: 8.8 FL (ref 8.1–13.5)
POTASSIUM SERPL-SCNC: 4.1 MMOL/L (ref 3.7–5.3)
RBC # BLD: 4.41 M/UL (ref 3.95–5.11)
RBC # BLD: ABNORMAL 10*6/UL
SEG NEUTROPHILS: 44 % (ref 36–65)
SEGMENTED NEUTROPHILS ABSOLUTE COUNT: 2.51 K/UL (ref 1.5–8.1)
SODIUM BLD-SCNC: 144 MMOL/L (ref 135–144)
TROPONIN INTERP: NORMAL
TROPONIN INTERP: NORMAL
TROPONIN T: NORMAL NG/ML
TROPONIN T: NORMAL NG/ML
TROPONIN, HIGH SENSITIVITY: 10 NG/L (ref 0–14)
TROPONIN, HIGH SENSITIVITY: 11 NG/L (ref 0–14)
WBC # BLD: 5.6 K/UL (ref 3.5–11.3)
WBC # BLD: ABNORMAL 10*3/UL

## 2021-08-05 PROCEDURE — 96376 TX/PRO/DX INJ SAME DRUG ADON: CPT

## 2021-08-05 PROCEDURE — 84484 ASSAY OF TROPONIN QUANT: CPT

## 2021-08-05 PROCEDURE — 80048 BASIC METABOLIC PNL TOTAL CA: CPT

## 2021-08-05 PROCEDURE — 93005 ELECTROCARDIOGRAM TRACING: CPT | Performed by: EMERGENCY MEDICINE

## 2021-08-05 PROCEDURE — 71045 X-RAY EXAM CHEST 1 VIEW: CPT

## 2021-08-05 PROCEDURE — 6360000002 HC RX W HCPCS: Performed by: EMERGENCY MEDICINE

## 2021-08-05 PROCEDURE — 96375 TX/PRO/DX INJ NEW DRUG ADDON: CPT

## 2021-08-05 PROCEDURE — 6370000000 HC RX 637 (ALT 250 FOR IP): Performed by: EMERGENCY MEDICINE

## 2021-08-05 PROCEDURE — 2580000003 HC RX 258: Performed by: FAMILY MEDICINE

## 2021-08-05 PROCEDURE — 96372 THER/PROPH/DIAG INJ SC/IM: CPT

## 2021-08-05 PROCEDURE — 94761 N-INVAS EAR/PLS OXIMETRY MLT: CPT

## 2021-08-05 PROCEDURE — G0378 HOSPITAL OBSERVATION PER HR: HCPCS

## 2021-08-05 PROCEDURE — 6370000000 HC RX 637 (ALT 250 FOR IP): Performed by: NURSE PRACTITIONER

## 2021-08-05 PROCEDURE — 36415 COLL VENOUS BLD VENIPUNCTURE: CPT

## 2021-08-05 PROCEDURE — 2580000003 HC RX 258: Performed by: NURSE PRACTITIONER

## 2021-08-05 PROCEDURE — 99284 EMERGENCY DEPT VISIT MOD MDM: CPT

## 2021-08-05 PROCEDURE — 6360000002 HC RX W HCPCS: Performed by: NURSE PRACTITIONER

## 2021-08-05 PROCEDURE — 2500000003 HC RX 250 WO HCPCS: Performed by: EMERGENCY MEDICINE

## 2021-08-05 PROCEDURE — 94664 DEMO&/EVAL PT USE INHALER: CPT

## 2021-08-05 PROCEDURE — 85025 COMPLETE CBC W/AUTO DIFF WBC: CPT

## 2021-08-05 PROCEDURE — 93005 ELECTROCARDIOGRAM TRACING: CPT | Performed by: FAMILY MEDICINE

## 2021-08-05 PROCEDURE — 94640 AIRWAY INHALATION TREATMENT: CPT

## 2021-08-05 PROCEDURE — 96374 THER/PROPH/DIAG INJ IV PUSH: CPT

## 2021-08-05 PROCEDURE — 85730 THROMBOPLASTIN TIME PARTIAL: CPT

## 2021-08-05 RX ORDER — ACYCLOVIR 200 MG/1
400 CAPSULE ORAL EVERY 8 HOURS SCHEDULED
Status: DISCONTINUED | OUTPATIENT
Start: 2021-08-05 | End: 2021-08-06 | Stop reason: HOSPADM

## 2021-08-05 RX ORDER — ACETAMINOPHEN 500 MG
1000 TABLET ORAL ONCE
Status: COMPLETED | OUTPATIENT
Start: 2021-08-05 | End: 2021-08-05

## 2021-08-05 RX ORDER — HEPARIN SODIUM 10000 [USP'U]/100ML
1000 INJECTION, SOLUTION INTRAVENOUS CONTINUOUS
Status: DISCONTINUED | OUTPATIENT
Start: 2021-08-05 | End: 2021-08-05

## 2021-08-05 RX ORDER — SODIUM CHLORIDE 9 MG/ML
INJECTION, SOLUTION INTRAVENOUS CONTINUOUS
Status: DISCONTINUED | OUTPATIENT
Start: 2021-08-05 | End: 2021-08-06 | Stop reason: HOSPADM

## 2021-08-05 RX ORDER — MORPHINE SULFATE 4 MG/ML
4 INJECTION, SOLUTION INTRAMUSCULAR; INTRAVENOUS ONCE
Status: COMPLETED | OUTPATIENT
Start: 2021-08-05 | End: 2021-08-05

## 2021-08-05 RX ORDER — ACETAMINOPHEN 325 MG/1
650 TABLET ORAL EVERY 6 HOURS PRN
Status: DISCONTINUED | OUTPATIENT
Start: 2021-08-05 | End: 2021-08-06 | Stop reason: HOSPADM

## 2021-08-05 RX ORDER — ONDANSETRON 2 MG/ML
4 INJECTION INTRAMUSCULAR; INTRAVENOUS EVERY 6 HOURS PRN
Status: DISCONTINUED | OUTPATIENT
Start: 2021-08-05 | End: 2021-08-06 | Stop reason: HOSPADM

## 2021-08-05 RX ORDER — LABETALOL HYDROCHLORIDE 5 MG/ML
10 INJECTION, SOLUTION INTRAVENOUS ONCE
Status: COMPLETED | OUTPATIENT
Start: 2021-08-05 | End: 2021-08-05

## 2021-08-05 RX ORDER — LATANOPROST 50 UG/ML
1 SOLUTION/ DROPS OPHTHALMIC NIGHTLY
Status: DISCONTINUED | OUTPATIENT
Start: 2021-08-05 | End: 2021-08-06 | Stop reason: HOSPADM

## 2021-08-05 RX ORDER — ATORVASTATIN CALCIUM 40 MG/1
40 TABLET, FILM COATED ORAL DAILY
Status: DISCONTINUED | OUTPATIENT
Start: 2021-08-05 | End: 2021-08-06 | Stop reason: HOSPADM

## 2021-08-05 RX ORDER — NITROGLYCERIN 0.4 MG/1
0.4 TABLET SUBLINGUAL EVERY 5 MIN PRN
Status: DISCONTINUED | OUTPATIENT
Start: 2021-08-05 | End: 2021-08-06 | Stop reason: HOSPADM

## 2021-08-05 RX ORDER — BUDESONIDE AND FORMOTEROL FUMARATE DIHYDRATE 160; 4.5 UG/1; UG/1
2 AEROSOL RESPIRATORY (INHALATION) 2 TIMES DAILY
Status: DISCONTINUED | OUTPATIENT
Start: 2021-08-05 | End: 2021-08-06 | Stop reason: HOSPADM

## 2021-08-05 RX ORDER — ROPINIROLE 1 MG/1
1 TABLET, FILM COATED ORAL DAILY
Status: DISCONTINUED | OUTPATIENT
Start: 2021-08-05 | End: 2021-08-06 | Stop reason: HOSPADM

## 2021-08-05 RX ORDER — TIMOLOL MALEATE 5 MG/ML
1 SOLUTION/ DROPS OPHTHALMIC 2 TIMES DAILY
Status: DISCONTINUED | OUTPATIENT
Start: 2021-08-05 | End: 2021-08-06 | Stop reason: HOSPADM

## 2021-08-05 RX ORDER — SODIUM CHLORIDE 0.9 % (FLUSH) 0.9 %
5-40 SYRINGE (ML) INJECTION EVERY 12 HOURS SCHEDULED
Status: DISCONTINUED | OUTPATIENT
Start: 2021-08-05 | End: 2021-08-06 | Stop reason: HOSPADM

## 2021-08-05 RX ORDER — ONDANSETRON 2 MG/ML
4 INJECTION INTRAMUSCULAR; INTRAVENOUS ONCE
Status: COMPLETED | OUTPATIENT
Start: 2021-08-05 | End: 2021-08-05

## 2021-08-05 RX ORDER — POLYVINYL ALCOHOL 14 MG/ML
1 SOLUTION/ DROPS OPHTHALMIC 2 TIMES DAILY
Status: DISCONTINUED | OUTPATIENT
Start: 2021-08-05 | End: 2021-08-06 | Stop reason: HOSPADM

## 2021-08-05 RX ORDER — ALBUTEROL SULFATE 90 UG/1
2 AEROSOL, METERED RESPIRATORY (INHALATION) EVERY 6 HOURS PRN
Status: DISCONTINUED | OUTPATIENT
Start: 2021-08-05 | End: 2021-08-06 | Stop reason: HOSPADM

## 2021-08-05 RX ORDER — ACETAMINOPHEN 650 MG/1
650 SUPPOSITORY RECTAL EVERY 6 HOURS PRN
Status: DISCONTINUED | OUTPATIENT
Start: 2021-08-05 | End: 2021-08-06 | Stop reason: HOSPADM

## 2021-08-05 RX ORDER — PROMETHAZINE HYDROCHLORIDE 25 MG/ML
12.5 INJECTION, SOLUTION INTRAMUSCULAR; INTRAVENOUS ONCE
Status: COMPLETED | OUTPATIENT
Start: 2021-08-05 | End: 2021-08-05

## 2021-08-05 RX ORDER — POLYETHYLENE GLYCOL 3350 17 G/17G
17 POWDER, FOR SOLUTION ORAL DAILY PRN
Status: DISCONTINUED | OUTPATIENT
Start: 2021-08-05 | End: 2021-08-06 | Stop reason: HOSPADM

## 2021-08-05 RX ORDER — SODIUM CHLORIDE 9 MG/ML
25 INJECTION, SOLUTION INTRAVENOUS PRN
Status: DISCONTINUED | OUTPATIENT
Start: 2021-08-05 | End: 2021-08-06 | Stop reason: HOSPADM

## 2021-08-05 RX ORDER — PANTOPRAZOLE SODIUM 20 MG/1
20 TABLET, DELAYED RELEASE ORAL DAILY
Status: DISCONTINUED | OUTPATIENT
Start: 2021-08-05 | End: 2021-08-06 | Stop reason: HOSPADM

## 2021-08-05 RX ORDER — ASPIRIN 81 MG/1
324 TABLET, CHEWABLE ORAL ONCE
Status: COMPLETED | OUTPATIENT
Start: 2021-08-05 | End: 2021-08-05

## 2021-08-05 RX ORDER — ONDANSETRON 4 MG/1
4 TABLET, ORALLY DISINTEGRATING ORAL EVERY 8 HOURS PRN
Status: DISCONTINUED | OUTPATIENT
Start: 2021-08-05 | End: 2021-08-06 | Stop reason: HOSPADM

## 2021-08-05 RX ORDER — ASPIRIN 81 MG/1
81 TABLET ORAL DAILY
Status: DISCONTINUED | OUTPATIENT
Start: 2021-08-05 | End: 2021-08-06 | Stop reason: HOSPADM

## 2021-08-05 RX ORDER — SODIUM CHLORIDE 0.9 % (FLUSH) 0.9 %
5-40 SYRINGE (ML) INJECTION PRN
Status: DISCONTINUED | OUTPATIENT
Start: 2021-08-05 | End: 2021-08-06 | Stop reason: HOSPADM

## 2021-08-05 RX ORDER — LEFLUNOMIDE 10 MG/1
20 TABLET ORAL DAILY
Status: DISCONTINUED | OUTPATIENT
Start: 2021-08-05 | End: 2021-08-06 | Stop reason: HOSPADM

## 2021-08-05 RX ORDER — 0.9 % SODIUM CHLORIDE 0.9 %
250 INTRAVENOUS SOLUTION INTRAVENOUS ONCE
Status: COMPLETED | OUTPATIENT
Start: 2021-08-05 | End: 2021-08-05

## 2021-08-05 RX ORDER — HEPARIN SODIUM 5000 [USP'U]/ML
4000 INJECTION, SOLUTION INTRAVENOUS; SUBCUTANEOUS ONCE
Status: COMPLETED | OUTPATIENT
Start: 2021-08-05 | End: 2021-08-05

## 2021-08-05 RX ADMIN — HEPARIN SODIUM AND DEXTROSE 1000 UNITS/HR: 10000; 5 INJECTION INTRAVENOUS at 10:55

## 2021-08-05 RX ADMIN — TICAGRELOR 180 MG: 90 TABLET ORAL at 10:45

## 2021-08-05 RX ADMIN — MORPHINE SULFATE 4 MG: 4 INJECTION, SOLUTION INTRAMUSCULAR; INTRAVENOUS at 10:44

## 2021-08-05 RX ADMIN — ROPINIROLE HYDROCHLORIDE 1 MG: 1 TABLET, FILM COATED ORAL at 21:12

## 2021-08-05 RX ADMIN — ONDANSETRON 4 MG: 2 INJECTION INTRAMUSCULAR; INTRAVENOUS at 10:45

## 2021-08-05 RX ADMIN — LABETALOL HYDROCHLORIDE 10 MG: 5 INJECTION INTRAVENOUS at 16:17

## 2021-08-05 RX ADMIN — ACETAMINOPHEN 1000 MG: 500 TABLET, FILM COATED ORAL at 10:44

## 2021-08-05 RX ADMIN — SODIUM CHLORIDE 250 ML: 9 INJECTION, SOLUTION INTRAVENOUS at 20:25

## 2021-08-05 RX ADMIN — ENOXAPARIN SODIUM 60 MG: 60 INJECTION SUBCUTANEOUS at 18:59

## 2021-08-05 RX ADMIN — ALBUTEROL SULFATE 2 PUFF: 90 AEROSOL, METERED RESPIRATORY (INHALATION) at 20:34

## 2021-08-05 RX ADMIN — ONDANSETRON 4 MG: 2 INJECTION INTRAMUSCULAR; INTRAVENOUS at 13:23

## 2021-08-05 RX ADMIN — SODIUM CHLORIDE: 9 INJECTION, SOLUTION INTRAVENOUS at 23:31

## 2021-08-05 RX ADMIN — ASPIRIN 324 MG: 81 TABLET, CHEWABLE ORAL at 10:44

## 2021-08-05 RX ADMIN — ACYCLOVIR 400 MG: 200 CAPSULE ORAL at 18:59

## 2021-08-05 RX ADMIN — SODIUM CHLORIDE: 9 INJECTION, SOLUTION INTRAVENOUS at 17:38

## 2021-08-05 RX ADMIN — LATANOPROST 1 DROP: 50 SOLUTION OPHTHALMIC at 21:12

## 2021-08-05 RX ADMIN — BUDESONIDE AND FORMOTEROL FUMARATE DIHYDRATE 2 PUFF: 160; 4.5 AEROSOL RESPIRATORY (INHALATION) at 20:36

## 2021-08-05 RX ADMIN — HEPARIN SODIUM 4000 UNITS: 5000 INJECTION INTRAVENOUS; SUBCUTANEOUS at 10:55

## 2021-08-05 RX ADMIN — PANTOPRAZOLE SODIUM 20 MG: 20 TABLET, DELAYED RELEASE ORAL at 18:58

## 2021-08-05 RX ADMIN — MORPHINE SULFATE 4 MG: 4 INJECTION, SOLUTION INTRAMUSCULAR; INTRAVENOUS at 13:23

## 2021-08-05 RX ADMIN — LABETALOL HYDROCHLORIDE 10 MG: 5 INJECTION INTRAVENOUS at 10:54

## 2021-08-05 RX ADMIN — ATORVASTATIN CALCIUM 40 MG: 40 TABLET, FILM COATED ORAL at 18:59

## 2021-08-05 RX ADMIN — POLYVINYL ALCOHOL 1 DROP: 14 SOLUTION/ DROPS OPHTHALMIC at 21:16

## 2021-08-05 RX ADMIN — PROMETHAZINE HYDROCHLORIDE 12.5 MG: 25 INJECTION, SOLUTION INTRAMUSCULAR; INTRAVENOUS at 16:03

## 2021-08-05 RX ADMIN — TIMOLOL MALEATE 1 DROP: 5 SOLUTION OPHTHALMIC at 21:15

## 2021-08-05 RX ADMIN — LEFLUNOMIDE 20 MG: 10 TABLET ORAL at 20:25

## 2021-08-05 ASSESSMENT — PAIN DESCRIPTION - PAIN TYPE
TYPE: ACUTE PAIN

## 2021-08-05 ASSESSMENT — ENCOUNTER SYMPTOMS
CHOKING: 0
BACK PAIN: 0
SHORTNESS OF BREATH: 1
WHEEZING: 0
DIARRHEA: 0
NAUSEA: 1
ABDOMINAL DISTENTION: 0
VOMITING: 0
CONSTIPATION: 0
COUGH: 0
ABDOMINAL PAIN: 0

## 2021-08-05 ASSESSMENT — PAIN SCALES - GENERAL
PAINLEVEL_OUTOF10: 0
PAINLEVEL_OUTOF10: 5
PAINLEVEL_OUTOF10: 4
PAINLEVEL_OUTOF10: 3
PAINLEVEL_OUTOF10: 0
PAINLEVEL_OUTOF10: 0
PAINLEVEL_OUTOF10: 3
PAINLEVEL_OUTOF10: 5
PAINLEVEL_OUTOF10: 3
PAINLEVEL_OUTOF10: 0
PAINLEVEL_OUTOF10: 0
PAINLEVEL_OUTOF10: 5

## 2021-08-05 ASSESSMENT — PAIN DESCRIPTION - LOCATION
LOCATION: CHEST

## 2021-08-05 ASSESSMENT — PAIN DESCRIPTION - ORIENTATION: ORIENTATION: MID;ANTERIOR

## 2021-08-05 ASSESSMENT — PAIN DESCRIPTION - FREQUENCY: FREQUENCY: CONTINUOUS

## 2021-08-05 ASSESSMENT — PAIN DESCRIPTION - DESCRIPTORS: DESCRIPTORS: PRESSURE

## 2021-08-05 NOTE — ED PROVIDER NOTES
[facility] [place] ED  EMERGENCY DEPARTMENT ENCOUNTER      [facility]  MRN: [id number]  Birthdate 1950  Date of evaluation: 8/5/2021  Provider: Cornell Roa MD    CHIEF COMPLAINT     Chief Complaint   Patient presents with    Chest Pain     pt c/o midtsternal chest pain, worse this am. Pt states she took 3 NTG sl PTA without relief         HISTORY OF PRESENT ILLNESS   (Location/Symptom, Timing/Onset, Context/Setting, Quality, Duration, Modifying Factors, Severity)  Note limiting factors. HPI the patient is a 44-year-old female who presents with midsternal chest pain that is a pressure. The pressure is a level 5. She also has some sharp chest pain in the left side of her chest.  The sharp pain is a level 9. She took 3 sublingual nitros without much improvement. She had some shortness of breath but no diaphoresis. She did have some mild nausea. The patient has 4 stents. Nursing Notes were reviewed. REVIEW OF SYSTEMS    (2-9 systems for level 4, 10 or more for level 5)     Review of Systems   Constitutional: Positive for activity change. Negative for diaphoresis and fever. HENT: Negative for congestion. Respiratory: Positive for shortness of breath. Negative for cough, choking and wheezing. Cardiovascular: Positive for chest pain. Negative for palpitations and leg swelling. Gastrointestinal: Positive for nausea. Negative for abdominal distention, abdominal pain, constipation, diarrhea and vomiting. Genitourinary: Negative for dysuria. Musculoskeletal: Negative for back pain and neck pain. Skin: Negative. Neurological: Negative for dizziness, light-headedness and headaches. Psychiatric/Behavioral: Negative for confusion, decreased concentration and dysphoric mood. MEDICAL HISTORY     Past Medical History:   Diagnosis Date    Arthritis     CAD (coronary artery disease) 4/11, 9/12    4 total stents. 2 Stents in RCA, 1 in small OM1.     H/O cardiovascular stress test 5/22/13, 6/25/14    Relatively normal without evidence of significant ischemia or infarction. global LV systolic function was normal w/o regional wall motion abnormalities. No significant EKG evidence of ischemia during monitoring w/o significant associated arrhythmias. Tay score is 5.     H/O cardiovascular stress test 12/28/2017    Larglely normal myocardial perfusion imaging with soft tissue artifact but w/o evidence of significant myocardial ischemia or infarction. results are most consistent with low/intermediate risk for significant CAD    H/O echocardiogram 5/21/13, 6/24/14    EF>55%, mild concentric LVH    History of echocardiogram 11/27/2018    EF of 60%. evidence of moderate diastolic dysfunction is seen.      Hx of cardiac catheterization 08/09/2018    MIld CAD w/o any significant focal stenosis with a normal LV end diastolic pressure LVEDP interestingly the pt developed pretty pronounced ST depression on Lt main engagment with mod chest pressure which resolved o disengagement of the LT main    Hx of heart artery stent 10/06/2011    Lesion of Prox RCA 75% stenosis-Drug Eluting stent to RCA    Hx of percutaneous left heart catheterization 04/07/2011    LMCA, LAD & RAMUS- normal, LCX-lesion on Mid CX 40% stenosis, RCA-Patent stent, EF 60%    Hx of percutaneous left heart catheterization 04/11/2012    Normal renals, Patent RCA stents with mid LCX stenosis IVUS suggest 60-70% stenosis and due to continues ischemia, a LISA was inserted    Hx of percutaneous left heart catheterization 09/27/2012    LMCA & LAD -Normal, LCXMild luminal irregularities, Patent prior stent, RCA-Patent stents in Prox and mid segment    Hyperlipidemia     Normal nuclear stress test 06/06/2012         SURGICAL HISTORY       Past Surgical History:   Procedure Laterality Date    CARDIAC CATHETERIZATION Left 08/09/2018    right radial/ [employer]/ Dr Anthony Garnett significant coronary artery disease. Widely patent RCA stent. However, the patient did have pretty severe ST depression on left main engagement with moderate chest pressure that resolved on disengagement    CARPAL TUNNEL RELEASE      CORONARY ANGIOPLASTY WITH STENT PLACEMENT      NECK SURGERY      disc replaced in neck         CURRENT MEDICATIONS       Previous Medications    ALBUTEROL SULFATE HFA (VENTOLIN HFA) 108 (90 BASE) MCG/ACT INHALER    Inhale 2 puffs into the lungs every 6 hours as needed for Wheezing Dx code: J44.9    ALBUTEROL SULFATE  (90 BASE) MCG/ACT INHALER    Inhale 2 puffs into the lungs every 6 hours as needed for Wheezing    ASPIRIN 81 MG TABLET    Take 81 mg by mouth daily. ATORVASTATIN (LIPITOR) 40 MG TABLET    Take 1 tablet by mouth daily    FAMCICLOVIR (FAMVIR) 250 MG TABLET    Take 250 mg by mouth 2 times daily    FLUTICASONE-SALMETEROL (WIXELA INHUB) 250-50 MCG/DOSE AEPB    Inhale 1 puff into the lungs every 12 hours    LATANOPROST (XALATAN) 0.005 % OPHTHALMIC SOLUTION    1 drop nightly    LEFLUNOMIDE (ARAVA) 20 MG TABLET    Take 20 mg by mouth daily    NITROGLYCERIN (NITROSTAT) 0.4 MG SL TABLET    DISSOLVE 1 TABLET UNDER THE TONGUE EVERY 5 MINUTES AS  NEEDED FOR CHEST PAIN. MAX  OF 3 TABLETS IN 15 MINUTES. CALL 911 IF PAIN PERSISTS.     PANTOPRAZOLE (PROTONIX) 20 MG TABLET    Take 1 tablet by mouth daily TAKE 1 TABLET BY MOUTH  DAILY    PREDNISONE PO    Take by mouth    RESTASIS 0.05 % OPHTHALMIC EMULSION    Place 1 drop into both eyes 2 times daily     ROPINIROLE (REQUIP) 0.5 MG TABLET    Take 2 tablets by mouth daily    TIMOLOL (BETIMOL) 0.5 % OPHTHALMIC SOLUTION    Place 1 drop into the right eye 2 times daily    TIOTROPIUM (SPIRIVA HANDIHALER) 18 MCG INHALATION CAPSULE    INHALE THE CONTENTS OF 1  CAPSULE BY MOUTH VIA  HANDIHALER DAILY       ALLERGIES     Benadryl [diphenhydramine], Iv dye [iodides], and Pcn [penicillins]    FAMILY HISTORY       Family History   Problem Relation Age of Onset    Cancer Mother         breast cancer    Cancer Brother           SOCIAL HISTORY       Social History     Socioeconomic History    Marital status:      Spouse name: None    Number of children: None    Years of education: None    Highest education level: None   Occupational History    None   Tobacco Use    Smoking status: Former Smoker     Packs/day: 2.00     Years: 20.00     Pack years: 40.00     Types: Cigarettes     Quit date: 2000     Years since quittin.2    Smokeless tobacco: Never Used   Vaping Use    Vaping Use: Never used   Substance and Sexual Activity    Alcohol use: Yes     Comment: social    Drug use: No    Sexual activity: Not Currently   Other Topics Concern    None   Social History Narrative    None     Social Determinants of Health     Financial Resource Strain: Low Risk     Difficulty of Paying Living Expenses: Not hard at all   Food Insecurity: No Food Insecurity    Worried About Running Out of Food in the Last Year: Never true    Jaqueline of Food in the Last Year: Never true   Transportation Needs: No Transportation Needs    Lack of Transportation (Medical): No    Lack of Transportation (Non-Medical):  No   Physical Activity: Insufficiently Active    Days of Exercise per Week: 3 days    Minutes of Exercise per Session: 30 min   Stress:     Feeling of Stress :    Social Connections:     Frequency of Communication with Friends and Family:     Frequency of Social Gatherings with Friends and Family:     Attends Sabianism Services:     Active Member of Clubs or Organizations:     Attends Club or Organization Meetings:     Marital Status:    Intimate Partner Violence:     Fear of Current or Ex-Partner:     Emotionally Abused:     Physically Abused:     Sexually Abused:        SCREENINGS             PHYSICAL EXAM  (up to 7 for level 4, 8 or more for level 5)     ED Triage Vitals [21 1014]   BP Temp Temp Source Pulse Resp SpO2 Height Weight   (!) 257/109 98.3 °F (36.8 °C) Tympanic 88 20 97 % -- 135 lb (61.2 kg)       Physical Exam  Constitutional:       General: She is not in acute distress. Appearance: Normal appearance. She is normal weight. HENT:      Head: Normocephalic and atraumatic. Mouth/Throat:      Mouth: Mucous membranes are moist.      Pharynx: Oropharynx is clear. Eyes:      Extraocular Movements: Extraocular movements intact. Conjunctiva/sclera: Conjunctivae normal.      Pupils: Pupils are equal, round, and reactive to light. Cardiovascular:      Rate and Rhythm: Normal rate and regular rhythm. Pulses: Normal pulses. Heart sounds: Normal heart sounds. Pulmonary:      Effort: Pulmonary effort is normal.      Breath sounds: Normal breath sounds. Abdominal:      General: Abdomen is flat. Bowel sounds are normal.      Palpations: Abdomen is soft. Musculoskeletal:         General: No swelling. Normal range of motion. Cervical back: Normal range of motion and neck supple. Skin:     General: Skin is warm and dry. Neurological:      General: No focal deficit present. Mental Status: She is alert and oriented to person, place, and time. Mental status is at baseline. Psychiatric:         Mood and Affect: Mood normal.         Behavior: Behavior normal.         Thought Content: Thought content normal.         Judgment: Judgment normal.         DIAGNOSTIC RESULTS     EKG: All EKG's are interpreted by the Emergency Department Physician whoeither signs or Co-signs this chart in the absence of a cardiologist.  Initial EKG shows a normal sinus rhythm at a rate of 87. Normal intervals. Axis of 10. Normal QRS and normal ST-T waves. Repeat EKG shows a normal sinus rhythm with premature atrial complexes at a rate of 92. Normal intervals. Axis is 13. Normal QRS. Mild nonspecific ST-T wave changes primarily flattening in aVL of the T wave in lead III.     RADIOLOGY:   Non-plain film images such as CT, Ultrasound and MRI are read by the radiologist. Tamara Ortiz radiographic images are visualized and preliminarily interpreted by the emergency physician     Interpretation per the Radiologist below, if available at the time of this note:    XR CHEST PORTABLE   Final Result   Unchanged appearance of the chest without acute airspace disease identified. ED BEDSIDE ULTRASOUND:   Performed by ED Physician - none    LABS:  Labs Reviewed   BASIC METABOLIC PANEL - Abnormal; Notable for the following components:       Result Value    Bun/Cre Ratio 24 (*)     Chloride 108 (*)     All other components within normal limits   CBC WITH AUTO DIFFERENTIAL - Abnormal; Notable for the following components:    Immature Granulocytes 1 (*)     All other components within normal limits   APTT - Abnormal; Notable for the following components:    PTT 20.2 (*)     All other components within normal limits   TROPONIN   TROPONIN       EMERGENCY DEPARTMENT COURSE and DIFFERENTIAL DIAGNOSIS/MDM:   Vitals:    Vitals:    08/05/21 1230 08/05/21 1245 08/05/21 1300 08/05/21 1315   BP: (!) 164/72 (!) 176/79 (!) 193/92 (!) 194/95   Pulse: 78 78 81 89   Resp: 15 18 20 16   Temp:       TempSrc:       SpO2: 97% 97% 97% 98%   Weight:               MDM the patient's initial heart score is a 6. The patient will be admitted for further cardiac work-up. CONSULTS:  IP CONSULT TO CARDIOLOGY  IP CONSULT TO CASE MANAGEMENT    PROCEDURES:  Unless otherwise noted below, none     Procedures    FINAL IMPRESSION      1. Unstable angina [facility])          DISPOSITION/PLAN   DISPOSITION Admitted 08/05/2021 03:42:14 PM      PATIENT REFERRED TO:  No follow-up provider specified.     DISCHARGE MEDICATIONS:  New Prescriptions    No medications on file              (Please note that portions ofthis note were completed with a voice recognition program.  Efforts were made to edit the dictations but occasionally words are mis-transcribed.)      Brenda Byrd MD (electronically signed)  Attending Emergency Physician          Kaitlyn Kan MD  08/05/21 1456

## 2021-08-05 NOTE — ED NOTES
Left message for Dr. Fermin Bro to return call to Dr. Archana Reynolds.      Nilo BLACK Reser  08/05/21 4545

## 2021-08-05 NOTE — ED NOTES
Dr. Kayden Miller made aware of Pt's BP of 213/177.   He gives verbal order for 10 mg [address] Clement[address] Byrd, [facility]  08/05/21 3821

## 2021-08-06 ENCOUNTER — APPOINTMENT (OUTPATIENT)
Dept: NON INVASIVE DIAGNOSTICS | Age: 71
End: 2021-08-06
Payer: MEDICARE

## 2021-08-06 ENCOUNTER — APPOINTMENT (OUTPATIENT)
Dept: CT IMAGING | Age: 71
End: 2021-08-06
Payer: MEDICARE

## 2021-08-06 VITALS
BODY MASS INDEX: 24.59 KG/M2 | SYSTOLIC BLOOD PRESSURE: 163 MMHG | RESPIRATION RATE: 16 BRPM | TEMPERATURE: 96.8 F | HEART RATE: 88 BPM | WEIGHT: 138.8 LBS | OXYGEN SATURATION: 97 % | HEIGHT: 63 IN | DIASTOLIC BLOOD PRESSURE: 80 MMHG

## 2021-08-06 PROBLEM — R07.9 CHEST PAIN IN ADULT: Status: ACTIVE | Noted: 2021-08-06

## 2021-08-06 LAB
CHOLESTEROL/HDL RATIO: 3.5
CHOLESTEROL: 182 MG/DL
EKG ATRIAL RATE: 80 BPM
EKG ATRIAL RATE: 87 BPM
EKG ATRIAL RATE: 91 BPM
EKG ATRIAL RATE: 92 BPM
EKG P AXIS: 62 DEGREES
EKG P AXIS: 65 DEGREES
EKG P AXIS: 67 DEGREES
EKG P AXIS: 73 DEGREES
EKG P-R INTERVAL: 138 MS
EKG P-R INTERVAL: 150 MS
EKG P-R INTERVAL: 152 MS
EKG P-R INTERVAL: 156 MS
EKG Q-T INTERVAL: 354 MS
EKG Q-T INTERVAL: 370 MS
EKG Q-T INTERVAL: 388 MS
EKG Q-T INTERVAL: 392 MS
EKG QRS DURATION: 74 MS
EKG QTC CALCULATION (BAZETT): 425 MS
EKG QTC CALCULATION (BAZETT): 452 MS
EKG QTC CALCULATION (BAZETT): 457 MS
EKG QTC CALCULATION (BAZETT): 477 MS
EKG R AXIS: 10 DEGREES
EKG R AXIS: 13 DEGREES
EKG R AXIS: 7 DEGREES
EKG R AXIS: 8 DEGREES
EKG T AXIS: 16 DEGREES
EKG T AXIS: 25 DEGREES
EKG T AXIS: 44 DEGREES
EKG T AXIS: 47 DEGREES
EKG VENTRICULAR RATE: 80 BPM
EKG VENTRICULAR RATE: 87 BPM
EKG VENTRICULAR RATE: 91 BPM
EKG VENTRICULAR RATE: 92 BPM
HCT VFR BLD CALC: 37.9 % (ref 36.3–47.1)
HDLC SERPL-MCNC: 52 MG/DL
HEMOGLOBIN: 12.2 G/DL (ref 11.9–15.1)
LDL CHOLESTEROL: 85 MG/DL (ref 0–130)
LV EF: 55 %
LVEF MODALITY: NORMAL
MCH RBC QN AUTO: 31.4 PG (ref 25.2–33.5)
MCHC RBC AUTO-ENTMCNC: 32.2 G/DL (ref 28.4–34.8)
MCV RBC AUTO: 97.7 FL (ref 82.6–102.9)
NRBC AUTOMATED: 0 PER 100 WBC
PDW BLD-RTO: 14 % (ref 11.8–14.4)
PLATELET # BLD: 180 K/UL (ref 138–453)
PMV BLD AUTO: 8.9 FL (ref 8.1–13.5)
RBC # BLD: 3.88 M/UL (ref 3.95–5.11)
TRIGL SERPL-MCNC: 227 MG/DL
TROPONIN INTERP: NORMAL
TROPONIN T: NORMAL NG/ML
TROPONIN, HIGH SENSITIVITY: 9 NG/L (ref 0–14)
VLDLC SERPL CALC-MCNC: ABNORMAL MG/DL (ref 1–30)
WBC # BLD: 4.6 K/UL (ref 3.5–11.3)

## 2021-08-06 PROCEDURE — 96375 TX/PRO/DX INJ NEW DRUG ADDON: CPT

## 2021-08-06 PROCEDURE — 93306 TTE W/DOPPLER COMPLETE: CPT

## 2021-08-06 PROCEDURE — 78452 HT MUSCLE IMAGE SPECT MULT: CPT

## 2021-08-06 PROCEDURE — 99215 OFFICE O/P EST HI 40 MIN: CPT | Performed by: FAMILY MEDICINE

## 2021-08-06 PROCEDURE — 6360000002 HC RX W HCPCS: Performed by: NURSE PRACTITIONER

## 2021-08-06 PROCEDURE — 85027 COMPLETE CBC AUTOMATED: CPT

## 2021-08-06 PROCEDURE — 93010 ELECTROCARDIOGRAM REPORT: CPT | Performed by: INTERNAL MEDICINE

## 2021-08-06 PROCEDURE — 84484 ASSAY OF TROPONIN QUANT: CPT

## 2021-08-06 PROCEDURE — 3430000000 HC RX DIAGNOSTIC RADIOPHARMACEUTICAL: Performed by: NURSE PRACTITIONER

## 2021-08-06 PROCEDURE — 80061 LIPID PANEL: CPT

## 2021-08-06 PROCEDURE — 6360000004 HC RX CONTRAST MEDICATION: Performed by: NURSE PRACTITIONER

## 2021-08-06 PROCEDURE — G0378 HOSPITAL OBSERVATION PER HR: HCPCS

## 2021-08-06 PROCEDURE — 71275 CT ANGIOGRAPHY CHEST: CPT

## 2021-08-06 PROCEDURE — 93017 CV STRESS TEST TRACING ONLY: CPT

## 2021-08-06 PROCEDURE — 36415 COLL VENOUS BLD VENIPUNCTURE: CPT

## 2021-08-06 PROCEDURE — 94761 N-INVAS EAR/PLS OXIMETRY MLT: CPT

## 2021-08-06 PROCEDURE — 93005 ELECTROCARDIOGRAM TRACING: CPT | Performed by: NURSE PRACTITIONER

## 2021-08-06 PROCEDURE — A9500 TC99M SESTAMIBI: HCPCS | Performed by: NURSE PRACTITIONER

## 2021-08-06 RX ORDER — RANOLAZINE 500 MG/1
500 TABLET, EXTENDED RELEASE ORAL 2 TIMES DAILY
Qty: 60 TABLET | Refills: 3 | Status: SHIPPED | OUTPATIENT
Start: 2021-08-06 | End: 2021-08-25

## 2021-08-06 RX ORDER — METHYLPREDNISOLONE SODIUM SUCCINATE 125 MG/2ML
125 INJECTION, POWDER, LYOPHILIZED, FOR SOLUTION INTRAMUSCULAR; INTRAVENOUS ONCE
Status: COMPLETED | OUTPATIENT
Start: 2021-08-06 | End: 2021-08-06

## 2021-08-06 RX ORDER — RANOLAZINE 500 MG/1
500 TABLET, EXTENDED RELEASE ORAL 2 TIMES DAILY
Status: DISCONTINUED | OUTPATIENT
Start: 2021-08-06 | End: 2021-08-06 | Stop reason: HOSPADM

## 2021-08-06 RX ADMIN — Medication 30 MILLICURIE: at 08:59

## 2021-08-06 RX ADMIN — METHYLPREDNISOLONE SODIUM SUCCINATE 125 MG: 125 INJECTION, POWDER, FOR SOLUTION INTRAMUSCULAR; INTRAVENOUS at 15:57

## 2021-08-06 RX ADMIN — IOPAMIDOL 75 ML: 755 INJECTION, SOLUTION INTRAVENOUS at 16:42

## 2021-08-06 RX ADMIN — Medication 10 MILLICURIE: at 07:30

## 2021-08-06 ASSESSMENT — PAIN SCALES - GENERAL
PAINLEVEL_OUTOF10: 0
PAINLEVEL_OUTOF10: 0
PAINLEVEL_OUTOF10: 5
PAINLEVEL_OUTOF10: 0
PAINLEVEL_OUTOF10: 0

## 2021-08-06 ASSESSMENT — PAIN DESCRIPTION - DESCRIPTORS: DESCRIPTORS: SHARP;STABBING

## 2021-08-06 ASSESSMENT — PAIN DESCRIPTION - LOCATION: LOCATION: CHEST

## 2021-08-06 ASSESSMENT — PAIN DESCRIPTION - PAIN TYPE: TYPE: ACUTE PAIN

## 2021-08-06 ASSESSMENT — PAIN DESCRIPTION - ORIENTATION: ORIENTATION: ANTERIOR

## 2021-08-06 NOTE — PROGRESS NOTES
Discussed discharge plans with the patient and . Patient is a 79year old female here with Unstable angina. She is alert, oriented, pleasant and cooperative during our conversation. Patient is  and lives at home with her . She uses no medical equipment. Both her and her  share in the cooking and the house keeping. She is independent with her ADL's. Patient manages her medications and drives. She has no outside services in the home. Her PCP is ESTELLE Alonzo CNP. She has medical insurance that helps with medication cost.    The discharge plan is home with no services. She has advance directives and they are on file. LSW to monitor and assist with any concerns as they arise.     ROBERTO Yao

## 2021-08-06 NOTE — DISCHARGE SUMMARY
Discharge Summary    Zuri Ramsey  :  1950  MRN:  [id number]    Admit date:  2021      Discharge date: 2021     Admitting Physician:  Radha Ledezma MD    Discharge Diagnoses:    Principal Problem:    Chest pain in adult  Active Problems:    Coronary vasospasm (HCC)    ASHD (arteriosclerotic heart disease)    Unstable angina ([address]  Resolved Problems:    * No resolved hospital problems. *      Hospital Course:   Zuri Ramsey is a 79 y.o. female admitted with chest pain in adult. She resented to the emergency room with complaints of chest pain that was worse in the evening at several times. Patient stated she took nitroglycerin without much relief. Patient does have a history of cardiac stents in the past.  She also complained of some shortness of breath but denied any diaphoresis. She denied any recent illness including fever chills nausea vomiting or diarrhea. Patient was evaluated in the ER and admitted. Troponins were negative EKGs were negative. Cardiology was consulted and a stress test was completed. Stress test was negative. Recommendation was to do a scan to evaluate and rule out any aortic dissection scan was completed and that was negative. Patient was started on Ranexa. She will be discharged home today and she will follow-up with cardiology as an outpatient. Consultants:  Dr. Madeleine Albright, cardiology    Procedures: Stress Test    Complications: none    Discharge Condition: good    Exam:  GEN:  alert and oriented to person, place and time, well-developed and well-nourished, in no acute distress  EYES: No gross abnormalities. , PERRL and EOMI  NECK: normal, supple, no lymphadenopathy,  no carotid bruits  PULM: clear to auscultation bilaterally- no wheezes, rales or rhonchi, normal air movement, no respiratory distress  COR: regular rate & rhythm, no murmurs, no gallops, S1 normal and S2 normal  ABD:  soft, non-tender, non-distended, normal bowel sounds, no masses or organomegaly  EXT:   no cyanosis, clubbing or edema present    NEURO: follows commands, BOB, no deficits  SKIN:  no rashes or significant lesions    Significant Diagnostic Studies:   Lab Results   Component Value Date    WBC 4.6 08/06/2021    HGB 12.2 08/06/2021     08/06/2021       Lab Results   Component Value Date    BUN 12 08/05/2021    CREATININE 0.50 08/05/2021     08/05/2021    K 4.1 08/05/2021    CALCIUM 8.9 08/05/2021     (H) 08/05/2021    CO2 23 08/05/2021    LABGLOM >60 08/05/2021       Lab Results   Component Value Date    WBCUA 0 TO 2 06/26/2019    RBCUA 0 TO 2 06/26/2019    EPITHUA 0 TO 2 06/26/2019    LEUKOCYTESUR NEGATIVE 06/26/2019    SPECGRAV 1.015 06/26/2019    GLUCOSEU NEGATIVE 06/26/2019    KETUA NEGATIVE 06/26/2019    PROTEINU NEGATIVE 06/26/2019    HGBUR TRACE (A) 06/26/2019    CASTUA NOT REPORTED 06/26/2019    CRYSTUA NOT REPORTED 06/26/2019    BACTERIA TRACE (A) 06/26/2019    YEAST NOT REPORTED 06/26/2019       ECHO-2D-M-MODE COMPLETE    Result Date: 8/6/2021  [facility] Transthoracic Echocardiography Report (TTE)  Patient Name SPEAKER     Date of Study               08/06/2021               Brian Tiffanie   Date of      1950  Gender                      Female  Birth   Age          79 year(s)  Race                           Room Number  6559        Height:                     63 inch, 160.02 cm   Corporate ID [id number]    Weight:                     138 pounds, 62.6 kg  #   Patient Acct [de-identified]   BSA:          1.65 m^2      BMI:      24.45  #                                                              kg/m^2   MR #         [id number]      Sonographer                 IshaanRenetta   Accession #  [id number]  Interpreting Physician      Dayanara Ramírez   Fellow                   Referring Nurse                           Practitioner   Interpreting             Referring Physician         Dayanara Ramírez  Fellow  Type of Study   TTE procedure:2D Echocardiogram, M-Mode, Doppler, Color Doppler. Procedure Date Date: 08/06/2021 Start: 09:15 AM Study Location: [facility] Indications:Chest pain. History / Tech. Comments: Chest pain PMHX: CAD, Stent Patient Status: Inpatient Height: 63 inches Weight: 138 pounds BSA: 1.65 m^2 BMI: 24.45 kg/m^2 BP: 154/99 mmHg Allergies   - Penicillin. - Contrast.   - *Unlisted:(BENADRYL). CONCLUSIONS Summary Global left ventricular systolic function appears preserved with an estimated ejection fraction of >55%. The left ventricular cavity size is within normal limits and the left ventricular wall thickness is mildly increased. No definite specific wall motion abnormalities were identified. Aortic valve is sclerotic but opens well. Mild to moderate aortic regurgitation. Evidence of mild (grade I) diastolic dysfunction is seen. An anterior echo free space is seen suggestive of a small effusion or fat pad. Compared to the previous study of 12/18/2020, no significant change was seen. Signature ----------------------------------------------------------------------------  Electronically signed by Renetta Quiros(Sonographer) on 08/06/2021 11:03  AM ---------------------------------------------------------------------------- ----------------------------------------------------------------------------  Electronically signed by Dayanara Ramírez(Interpreting physician) on 08/06/2021  02:40 PM ---------------------------------------------------------------------------- FINDINGS Left Atrium Left atrium is normal in size. Left Ventricle Global left ventricular systolic function appears preserved with an estimated ejection fraction of >55%. The left ventricular cavity size is within normal limits and the left ventricular wall thickness is mildly increased. No definite specific wall motion abnormalities were identified. Right Atrium Right atrium is normal in size. Right Ventricle Normal right ventricular size and function.  Mitral Valve Normal mitral valve structure and function. Aortic Valve Aortic valve is sclerotic but opens well. Mild to moderate aortic regurgitation. Tricuspid Valve Normal tricuspid valve structure and function. Pulmonic Valve The pulmonic valve is normal in structure. Pericardial Effusion An anterior echo free space is seen suggestive of a small effusion or fat pad. Miscellaneous Evidence of mild (grade I) diastolic dysfunction is seen. Normal aortic root dimension. M-mode / 2D Measurements & Calculations:   LVIDd:3.56 cm(3.7 - 5.6 cm)      Diastolic FAEEYO:59.58 ml  LVIDs:2.69 cm(2.2 - 4.0 cm)      Systolic CQLVTS:65.97 ml  IVSd:1.01 cm(0.6 - 1.1 cm)       Aortic Root:2.49 cm(2.0 - 3.7 cm)  LVPWd:1.05 cm(0.6 - 1.1 cm)      LA Dimension: 3.88 cm(1.9 - 4.0 cm)  Fractional Shortenin.44 %    LA volume/Index: 24.3 ml /15m^2  Calculated LVEF (%): 56.62 %     AV Cusp Separation: 1.78 cm   Mitral:                                 Aortic   Valve Area (P1/2-Time): 3.35 cm^2       Peak Velocity: 1.86 m/s  Peak E-Wave: 0.70 m/s                   Mean Velocity: 1.25 m/s  Peak A-Wave: 0.93 m/s                   Peak Gradient: 13.81 mmHg  E/A Ratio: 0.75                         Mean Gradient: 7.3 mmHg  Peak Gradient: 1.95 mmHg                                          Acceleration Time: 66.3 msec  P1/2t: 65.69 msec                                          AV VTI: 40.04 cm  Diastology / Tissue Doppler Lateral Wall E' velocity:0.08 m/s Lateral Wall E/E':9.55    XR CHEST PORTABLE    Result Date: 2021  EXAMINATION: ONE XRAY VIEW OF THE CHEST 2021 10:09 am COMPARISON: 2020, 2020 HISTORY: ORDERING SYSTEM PROVIDED HISTORY: cp TECHNOLOGIST PROVIDED HISTORY: cp FINDINGS: The cardiomediastinal silhouette is unchanged in appearance. There is no consolidation, pneumothorax, or evidence of edema. No effusion is appreciated. The osseous structures are unchanged in appearance.      Unchanged appearance of the chest without acute airspace disease identified. CTA CHEST W CONTRAST    Result Date: 8/6/2021  EXAMINATION: CTA OF THE CHEST 8/6/2021 4:41 pm TECHNIQUE: CTA of the chest was performed after the administration of intravenous contrast.  Multiplanar reformatted images are provided for review. MIP images are provided for review. Dose modulation, iterative reconstruction, and/or weight based adjustment of the mA/kV was utilized to reduce the radiation dose to as low as reasonably achievable. COMPARISON: Chest CT 10/29/2018 HISTORY: Acute chest pain FINDINGS: Pulmonary Arteries: Pulmonary arteries are adequately opacified for evaluation. No evidence of intraluminal filling defect to suggest pulmonary embolism. Main pulmonary artery is normal in caliber, 2.3 cm. Mediastinum: No evidence of mediastinal lymphadenopathy. A few scattered coronary artery calcifications are demonstrated. The heart and pericardium demonstrate no acute abnormality. There is no acute abnormality of the thoracic aorta. Ascending thoracic aorta 3 cm and descending thoracic aorta 2.4 cm.  60-70% atherosclerotic stenosis proximal left subclavian artery. Lungs/pleura: The lungs are without acute process. Mild hyperexpansion predominates upper lungs, typical of COPD. No focal consolidation or pulmonary edema. No evidence of pleural effusion or pneumothorax. Upper Abdomen: Limited images of the upper abdomen are unremarkable. Soft Tissues/Bones: No acute bone or soft tissue abnormality. 3D images: 3D reconstructed images were performed on a separate workstation and provided for review. These images were reviewed. 1. No pulmonary embolism. 2. No acute pulmonary disease. 3. 60-70% atherosclerotic stenosis proximal left subclavian artery. 4. Mild calcific atherosclerosis coronary arteries. 5. No aorta aneurysm or dissection. 6. Pulmonary sequela typical of COPD.        Assessment and Plan:  Patient Active Problem List    Diagnosis Date Noted    Non-cardiac chest pain 12/14/2020    Coronary vasospasm (HCC) 11/12/2018    Abnormal cardiovascular stress test 08/09/2018    Chest pain in adult 08/06/2021    Stage 2 moderate COPD by GOLD classification ([facility] 05/27/2020    Essential hypertension 10/14/2019    Shortness of breath on exertion 12/18/2017    Superficial bruising of lower leg 08/23/2016    Reactive airway disease that is not asthma 01/13/2016    Acute reaction to stress 02/23/2015    Chest pain, atypical 07/08/2014    GERD (gastroesophageal reflux disease) 07/08/2014    Unstable angina ([facility] 08/19/2013    Hyperlipidemia with target LDL less than 70 06/18/2013    ASHD (arteriosclerotic heart disease)         Discharge Medications:       Sharon Salcedo \"Patience\"   Home Medication Instructions GZL:[id number]    Printed on:08/06/21 1750   Medication Information                      albuterol sulfate HFA (VENTOLIN HFA) 108 (90 Base) MCG/ACT inhaler  Inhale 2 puffs into the lungs every 6 hours as needed for Wheezing Dx code: J44.9             aspirin 81 MG tablet  Take 81 mg by mouth daily. atorvastatin (LIPITOR) 40 MG tablet  Take 1 tablet by mouth daily             famciclovir (FAMVIR) 250 MG tablet  Take 250 mg by mouth 2 times daily             fluticasone-salmeterol (WIXELA INHUB) 250-50 MCG/DOSE AEPB  Inhale 1 puff into the lungs every 12 hours             latanoprost (XALATAN) 0.005 % ophthalmic solution  Place 1 drop into both eyes nightly              leflunomide (ARAVA) 20 MG tablet  Take 20 mg by mouth daily             nitroGLYCERIN (NITROSTAT) 0.4 MG SL tablet  DISSOLVE 1 TABLET UNDER THE TONGUE EVERY 5 MINUTES AS  NEEDED FOR CHEST PAIN. MAX  OF 3 TABLETS IN 15 MINUTES. CALL 911 IF PAIN PERSISTS.              pantoprazole (PROTONIX) 20 MG tablet  Take 1 tablet by mouth daily TAKE 1 TABLET BY MOUTH  DAILY             PREDNISONE PO  Take by mouth daily              ranolazine (RANEXA) 500 MG extended release tablet  Take 1 tablet by mouth 2 times daily             RESTASIS 0.05 % ophthalmic emulsion  Place 1 drop into both eyes 2 times daily as needed              rOPINIRole (REQUIP) 0.5 MG tablet  Take 2 tablets by mouth daily             timolol (BETIMOL) 0.5 % ophthalmic solution  Place 1 drop into the right eye 2 times daily             tiotropium (SPIRIVA HANDIHALER) 18 MCG inhalation capsule  INHALE THE CONTENTS OF 1  CAPSULE BY MOUTH VIA  HANDIHALER DAILY                 Patient Instructions:    Activity: activity as tolerated  Diet: cardiac diet  Wound Care: none needed  Other: None    Disposition:   Discharge to Home    Follow up:  Patient will be followed by ESTELLE Hayes CNP in 1-2 weeks; Dr. Miquel Wilson on Discharge (if applicable)  ACE/ARB in CHF: NA  Statin in MI: NA  ASA in MI: NA  Statin in CVA: NA  Antiplatelet in CVA: NA    Total time spent on discharge services: 40 minutes    Including the following activities:  Evaluation and Management of patient  Discussion with patient and/or surrogate about current care plan  Coordination with Case Management and/or   Coordination of care with Consultants (if applicable)   Coordination of care with Receiving Facility Physician (if applicable)  Completion of DME forms (if applicable)  Preparation of Discharge Summary  Preparation of Medication Reconciliation  Preparation of Discharge Prescriptions    Signed:  ESTELLE Jones CNP, ESTELLE, NP-C  8/6/2021, 5:50 PM

## 2021-08-06 NOTE — PROCEDURES
[address] [facility]                              CARDIAC STRESS TEST    PATIENT NAME: Bárbara Raphael                 :        1950  MED REC NO:   [id number]                              ROOM:       6058  ACCOUNT NO:   [de-identified]                           ADMIT DATE: 2021  PROVIDER:     [place] [facility]    DATE OF STUDY:  2021    ORDERING PROVIDER:  Cody Arguello. JOAN Davis    PRIMARY CARE PROVIDER:  JOAN King    INTERPRETING PHYSICIAN:  Kiya Torres MD    EXERCISE MYOCARDIAL PERFUSION STRESS TEST REPORT    Rest/stress single-isotope SPECT imaging with exercise stress and gated  SPECT imaging. INDICATION:  Assessment of recent chest pain and/or discomfort. CLINICAL HISTORY:  The patient is a 49-year-old woman with known  coronary artery disease. Previous cardiac history includes:  Stress test, cardiac  catheterization, percutaneous transluminal coronary angioplasty. Other previous history includes:  Chest pain, palpitations, fatigue,  dyspnea, lightheadedness. Symptoms just prior to testing included:  Chest discomfort (3/10). Relevant medications:  None. PROCEDURE:  The patient performed treadmill exercise using a Joey  protocol, completing 8:23 minutes and completing an estimated workload  of 77.35 metabolic equivalents (METS). The test was terminated due to fatigue, shortness of breath and chest  pain (unchanged 3/10). The heart rate was 88 beats per minute at baseline and increased to 127  beats per minute at peak exercise, which was 85% of the maximum  predicted heart rate. The rest blood pressure was 122/64 mmHg and  increased to 156/60 mmHg, which is a normal response.  During the  procedure, the patient developed fatigue, shortness of breath, leg  fatigue and chest pain (1/3), but denied any chest discomfort. Myocardial perfusion imaging: Imaging was performed at rest 30-45  minutes following the injection of 10 mCi of sestamibi. At peak  exercise, the patient was injected with 30 mCi of sestamibi and exercise  was continued for 1 minute. Gating post-stress tomographic imaging was  performed 30-45 minutes after stress. STRESS ECG RESULTS:  The resting electrocardiogram demonstrated normal  sinus rhythm without definitive ST-segment abnormalities suggestive of  myocardial ischemia. At peak exercise and during recovery, the patient developed:    Upsloping ST segment changes in leads II, III, aVF, V4, V5 and V6, which  did meet diagnostic criteria for myocardial ischemia with occasional  premature atrial contractions (PACs) and no premature ventricular  contractions (PVCs). NUCLEAR IMAGING RESULTS:  The overall quality of the study is fair. No  significant attenuation artifact was seen. There is no evidence of  abnormal lung uptake. Additionally, the right ventricle appears normal.  The left ventricular cavity is noted to be normal in size on the stress  images. There is no evidence of transient ischemic dilatation (TID) of  the left ventricle. Gated SPECT imaging reveals normal myocardial thickening and wall motion  with a calculated left ventricular ejection fraction of 75%. The rest images demonstrated a small perfusion abnormality of mild  intensity in the anteroseptal region, which is most likely due to  artifact. On stress imaging, a small perfusion abnormality of mild intensity was  noted in the anteroseptal region, which is most likely due to artifact. IMPRESSION:  1. Largely normal myocardial perfusion imaging with soft tissue  artifact, but without significant evidence of myocardial ischemia or  infarction. 2.  Global left ventricular systolic function was normal with an EF of  75% without regional wall motion abnormalities.     3.  Significant electrocardiographic evidence of myocardial ischemia  during EKG monitoring without significant associated arrhythmias. Overall, these results are most consistent with a low risk for  significant ischemia. Depending on the patient symptoms and level of clinical suspicion,  aggressive medical management vs. additional testing by coronary  angiography may be indicated.     Natalie Trinidad    D: 08/06/2021 15:07:45       T: 08/06/2021 15:10:29     BERNA/EBER_EDGARD  Job#: [id number]     Doc#: Unknown    CC:  JOAN Pierson APRN - CNP

## 2021-08-06 NOTE — PROGRESS NOTES
Unsuccessful attempt x2 to place new IV per radiology request. Patient actually screamed out and was crying with second attempt. Writer let radiology aware that new IV would need placed by supervisor if needed.

## 2021-08-06 NOTE — PLAN OF CARE
Problem: Cardiac:  Goal: Ability to maintain vital signs within normal range will improve  Description: Ability to maintain vital signs within normal range will improve  Outcome: Completed  Goal: Cardiovascular alteration will improve  Description: Cardiovascular alteration will improve  Outcome: Completed     Problem: Health Behavior:  Goal: Will modify at least one risk factor affecting health status  Description: Will modify at least one risk factor affecting health status  Outcome: Completed  Goal: Identification of resources available to assist in meeting health care needs will improve  Description: Identification of resources available to assist in meeting health care needs will improve  Outcome: Completed     Problem: Physical Regulation:  Goal: Complications related to the disease process, condition or treatment will be avoided or minimized  Description: Complications related to the disease process, condition or treatment will be avoided or minimized  Outcome: Completed     Problem: Pain:  Goal: Pain level will decrease  Description: Pain level will decrease  Outcome: Completed  Goal: Control of acute pain  Description: Control of acute pain  Outcome: Completed  Goal: Control of chronic pain  Description: Control of chronic pain  Outcome: Completed     Problem: Nutrition  Goal: Optimal nutrition therapy  8/6/2021 1850 by Saul Conner RN  Outcome: Completed  8/6/2021 1017 by Alexandria Grajeda RD, LD  Note: Nutrition Problem #1: Inadequate oral intake  Intervention: Food and/or Nutrient Delivery: Continue NPO  Nutritional Goals: PO > 75% of meals when oral intakes resumed

## 2021-08-06 NOTE — PLAN OF CARE
Problem: Cardiac:  Goal: Ability to maintain vital signs within normal range will improve  Description: Ability to maintain vital signs within normal range will improve  Outcome: Ongoing  Note: VS monitored. Pt was hypotensive and received 250 ml bolus. BP improved since. Problem: Cardiac:  Goal: Cardiovascular alteration will improve  Description: Cardiovascular alteration will improve  Outcome: Ongoing     Problem: Physical Regulation:  Goal: Complications related to the disease process, condition or treatment will be avoided or minimized  Description: Complications related to the disease process, condition or treatment will be avoided or minimized  Outcome: Ongoing     Problem: Pain:  Goal: Control of acute pain  Description: Control of acute pain  Outcome: Ongoing  Note: Pt has denied any chest pain thus far this shift.

## 2021-08-06 NOTE — PROGRESS NOTES
Pt reassessed and VS rechecked at this time. Pt continues to be A&Ox4. BP improved to 103/61. Pt continues to deny chest pain or any other pain. Pt has been resting in bed watching television, denies any other needs at this time. Call light remains within reach.

## 2021-08-06 NOTE — CONSULTS
concentric LVH    History of echocardiogram 11/27/2018    EF of 60%. evidence of moderate diastolic dysfunction is seen.  Hx of cardiac catheterization 08/09/2018    MIld CAD w/o any significant focal stenosis with a normal LV end diastolic pressure LVEDP interestingly the pt developed pretty pronounced ST depression on Lt main engagment with mod chest pressure which resolved o disengagement of the LT main    Hx of heart artery stent 10/06/2011    Lesion of Prox RCA 75% stenosis-Drug Eluting stent to RCA    Hx of percutaneous left heart catheterization 04/07/2011    LMCA, LAD & RAMUS- normal, LCX-lesion on Mid CX 40% stenosis, RCA-Patent stent, EF 60%    Hx of percutaneous left heart catheterization 04/11/2012    Normal renals, Patent RCA stents with mid LCX stenosis IVUS suggest 60-70% stenosis and due to continues ischemia, a LISA was inserted    Hx of percutaneous left heart catheterization 09/27/2012    LMCA & LAD -Normal, LCXMild luminal irregularities, Patent prior stent, RCA-Patent stents in Prox and mid segment    Hyperlipidemia     Normal nuclear stress test 06/06/2012       CURRENT ALLERGIES: Benadryl [diphenhydramine], Iv dye [iodides], and Pcn [penicillins] REVIEW OF SYSTEMS: 14 systems were reviewed. Pertinent positives and negatives as above, all else negative. Past Surgical History:   Procedure Laterality Date    CARDIAC CATHETERIZATION Left 08/09/2018    right radial/ [facility]/ Dr Brenna Goncalves significant coronary artery disease. Widely patent RCA stent.  However, the patient did have pretty severe ST depression on left main engagement with moderate chest pressure that resolved on disengagement   [address]      disc replaced in neck    Social History:  Social History     Tobacco Use    Smoking status: Former Smoker     Packs/day: 2.00     Years: 20.00     Pack years: 40.00     Types: Cigarettes Quit date: 2000     Years since quittin.2    Smokeless tobacco: Never Used   Vaping Use    Vaping Use: Never used   Substance Use Topics    Alcohol use: Yes     Comment: social    Drug use: No        OUTPATIENT MEDICATIONS:  Outpatient Medications Marked as Taking for the 21 encounter [facility]OSP[facility] Encounter)   Medication Sig Dispense Refill    PREDNISONE PO Take by mouth daily       leflunomide (ARAVA) 20 MG tablet Take 20 mg by mouth daily      latanoprost (XALATAN) 0.005 % ophthalmic solution Place 1 drop into both eyes nightly       albuterol sulfate HFA (VENTOLIN HFA) 108 (90 Base) MCG/ACT inhaler Inhale 2 puffs into the lungs every 6 hours as needed for Wheezing Dx code: J44.9 1 Inhaler 3    tiotropium (SPIRIVA HANDIHALER) 18 MCG inhalation capsule INHALE THE CONTENTS OF 1  CAPSULE BY MOUTH VIA  HANDIHALER DAILY 90 capsule 3    atorvastatin (LIPITOR) 40 MG tablet Take 1 tablet by mouth daily 90 tablet 1    pantoprazole (PROTONIX) 20 MG tablet Take 1 tablet by mouth daily TAKE 1 TABLET BY MOUTH  DAILY 90 tablet 1    rOPINIRole (REQUIP) 0.5 MG tablet Take 2 tablets by mouth daily 180 tablet 1    nitroGLYCERIN (NITROSTAT) 0.4 MG SL tablet DISSOLVE 1 TABLET UNDER THE TONGUE EVERY 5 MINUTES AS  NEEDED FOR CHEST PAIN. MAX  OF 3 TABLETS IN 15 MINUTES. CALL 911 IF PAIN PERSISTS. 75 tablet 4    famciclovir (FAMVIR) 250 MG tablet Take 250 mg by mouth 2 times daily      timolol (BETIMOL) 0.5 % ophthalmic solution Place 1 drop into the right eye 2 times daily      RESTASIS 0.05 % ophthalmic emulsion Place 1 drop into both eyes 2 times daily as needed       aspirin 81 MG tablet Take 81 mg by mouth daily.          tiotropium (SPIRIVA RESPIMAT) 2.5 MCG/ACT inhaler 2 puff, Daily  albuterol sulfate  (90 Base) MCG/ACT inhaler 2 puff, Q6H PRN  aspirin EC tablet 81 mg, Daily  atorvastatin (LIPITOR) tablet 40 mg, Daily  acyclovir (ZOVIRAX) capsule 400 mg, 3 times per day  budesonide-formoterol (SYMBICORT) 160-4.5 MCG/ACT inhaler 2 puff, BID  latanoprost (XALATAN) 0.005 % ophthalmic solution 1 drop, Nightly  leflunomide (ARAVA) tablet 20 mg, Daily  pantoprazole (PROTONIX) tablet 20 mg, Daily  polyvinyl alcohol (LIQUIFILM TEARS) 1.4 % ophthalmic solution 1 drop, BID  rOPINIRole (REQUIP) tablet 1 mg, Daily  timolol (TIMOPTIC) 0.5 % ophthalmic solution 1 drop, BID  0.9 % sodium chloride infusion, Continuous  sodium chloride flush 0.9 % injection 5-40 mL, 2 times per day  sodium chloride flush 0.9 % injection 5-40 mL, PRN  0.9 % sodium chloride infusion, PRN  ondansetron (ZOFRAN-ODT) disintegrating tablet 4 mg, Q8H PRN   Or  ondansetron (ZOFRAN) injection 4 mg, Q6H PRN  acetaminophen (TYLENOL) tablet 650 mg, Q6H PRN   Or  acetaminophen (TYLENOL) suppository 650 mg, Q6H PRN  polyethylene glycol (GLYCOLAX) packet 17 g, Daily PRN  enoxaparin (LOVENOX) injection 60 mg, BID  nitroGLYCERIN (NITROSTAT) SL tablet 0.4 mg, Q5 Min PRN        FAMILY HISTORY: family history includes Cancer in her brother and mother. PHYSICAL EXAM:   BP (!) 154/99   Pulse 85   Temp 97.7 °F (36.5 °C) (Temporal)   Resp 16   Ht 5' 3\" (1.6 m)   Wt 138 lb 12.8 oz (63 kg)   LMP  (LMP Unknown)   SpO2 97%   BMI 24.59 kg/m²     [ INSTRUCTIONS:  \"[x]\" Indicates a positive item  \"[]\" Indicates a negative item   Vital Signs: (As obtained by patient/caregiver or practitioner observation)  No flowsheet data found. Constitutional: [x] Appears well-developed and well-nourished [x] No apparent distress      [] Abnormal-   Mental status  [x] Alert and awake  [x] Oriented to person/place/time [x]Able to follow commands      Eyes:  EOM    [x]  Normal  [] Abnormal-  Sclera  [x]  Normal  [] Abnormal -         Discharge [x]  None visible  [] Abnormal -    HENT:   [x] Normocephalic, atraumatic.   [] Abnormal   [] Mouth/Throat: Mucous membranes are moist.     External Ears [x] Normal  [] Abnormal-     Neck: [x] No visualized mass     Pulmonary/Chest: [x] Respiratory effort normal.  [x] No visualized signs of difficulty breathing or respiratory distress        [] Abnormal-     Neurological:        [x] No Facial Asymmetry (Cranial nerve 7 motor function) (limited exam to video visit)          [] No gaze palsy        [] Abnormal-         Skin:        [x] No significant exanthematous lesions or discoloration noted on facial skin         [] Abnormal-            Psychiatric:       [x] Normal Affect [] No Hallucinations        [] Abnormal-     Other pertinent observable physical exam findings: None            MOST RECENT LABS ON RECORD:   Lab Results   Component Value Date    WBC 4.6 08/06/2021    HGB 12.2 08/06/2021    HCT 37.9 08/06/2021     08/06/2021    CHOL 158 08/11/2015    TRIG 88 08/11/2015    HDL 64 08/25/2020    ALT 22 07/01/2021    AST 22 07/01/2021     08/05/2021    K 4.1 08/05/2021     (H) 08/05/2021    CREATININE 0.50 08/05/2021    BUN 12 08/05/2021    CO2 23 08/05/2021    TSH 2.86 08/25/2020    INR 0.9 07/14/2019       ASSESSMENT:  Patient Active Problem List    Diagnosis Date Noted    Non-cardiac chest pain 12/14/2020    Abnormal cardiovascular stress test 08/09/2018    ACS (acute coronary syndrome) ([facility] 08/05/2021    Stage 2 moderate COPD by GOLD classification ([facility] 05/27/2020    Essential hypertension 10/14/2019    Shortness of breath on exertion 12/18/2017    Superficial bruising of lower leg 08/23/2016    Reactive airway disease that is not asthma 01/13/2016    Acute reaction to stress 02/23/2015    Chest pain, atypical 07/08/2014    GERD (gastroesophageal reflux disease) 07/08/2014    Unstable angina ([address] 08/19/2013    Hyperlipidemia with target LDL less than 70 06/18/2013    ASHD (arteriosclerotic heart disease)         PLAN:  · Atypical chest pain but still suspicious for possible myocardial ischemia: I also had a long discussion with her about a rare but serious condition called an aortic dissection.  Although I think this is unlikely, given the unclear nature of her symptoms, I do think it would be wise to try and rule this out with a CTA of the chest. Ms. Naida Arango was in agreement with this plan. Assuming this was unremarkable I think she could safely be discharged. · Medical Management for known history of coronary artery disease   Antiplatelet Agent: Continue Aspirin 81 mg daily.  Beta Blocker: Contraindicated due to history of symptomatic bradycardia, intolerance and/or allergy.  Anti-anginal medications: START ranolazine (Ranexa) 500 mg twice daily. I also discussed the potential side effects of this medication including lightheadedness and dizziness and instructed them to stop the medication of this occurs and call our office if this occurs.  Cholesterol Reduction Therapy: Continue Atorvastatin (Lipitor) 40 mg daily.  Counseling: I advised Ms. Marquez to call our office or go to the emergency room if she develops worsening or persistent chest pain or shortness of breath as this could be life threatening. I discussed this with Olena Hashimoto, CNP working with the [facility] who was also in agreement with the plan. Once again, thank you for allowing me to participate in this patients care. Please do not hesitate to contact me could I be of further assistance. Sincerely,  Lani Hartman. Bud BECKMAN, MS, F.A.C.C. [facility] Cardiology Specialist    [address] [place] [address]  Phone: [phone], Fax: [phone]     I believe that the risk of significant morbidity and mortality related to the patient's current medical conditions are: intermediate-high. >45 minutes were spent during prep work, discussion and exam of the patient, and follow up documentation and all of their questions were answered. The documentation recorded by the scribe, accurately and completely reflects the services I personally performed and the decisions made by me. Shelly Kelly MD, MS, F.A.C.C. August 6, 2021     Marci Haji is a 79 y.o. female being evaluated by a Virtual Visit (video visit) encounter to address concerns as mentioned above. A caregiver was present when appropriate. Due to this being a TeleHealth encounter (During DRRDY-33 public health emergency), evaluation of the following organ systems was limited: Vitals/Constitutional/EENT/Resp/CV/GI//MS/Neuro/Skin/Heme-Lymph-Imm. Pursuant to the emergency declaration under the [address] [place] [address] authority and the [employer] Act, this Virtual Visit was conducted with patient's (and/or legal guardian's) consent, to reduce the patient's risk of exposure to COVID-19 and provide necessary medical care. The patient (and/or legal guardian) has also been advised to contact this office for worsening conditions or problems, and seek emergency medical treatment and/or call 911 if deemed necessary. Services were provided through a video synchronous discussion virtually to substitute for in-person visit. Ms. Chandrakant Chavez was located in the patients hospital room in [address] [place] [address] [place] performed the visit from my home in [address] on 8/6/2021 at 9:02 AM    An electronic signature was used to authenticate this note.

## 2021-08-06 NOTE — H&P
RCA 75% stenosis-Drug Eluting stent to RCA    Hx of percutaneous left heart catheterization 04/07/2011    LMCA, LAD & RAMUS- normal, LCX-lesion on Mid CX 40% stenosis, RCA-Patent stent, EF 60%    Hx of percutaneous left heart catheterization 04/11/2012    Normal renals, Patent RCA stents with mid LCX stenosis IVUS suggest 60-70% stenosis and due to continues ischemia, a LISA was inserted    Hx of percutaneous left heart catheterization 09/27/2012    LMCA & LAD -Normal, LCXMild luminal irregularities, Patent prior stent, RCA-Patent stents in Prox and mid segment    Hyperlipidemia     Normal nuclear stress test 06/06/2012       Past Surgical History:        Procedure Laterality Date    CARDIAC CATHETERIZATION Left 08/09/2018    right radial/ [facility]/ Dr Willian Garnica significant coronary artery disease. Widely patent RCA stent. However, the patient did have pretty severe ST depression on left main engagement with moderate chest pressure that resolved on disengagement   [address]      disc replaced in neck       Family History:       Problem Relation Age of Onset    Cancer Mother         breast cancer    Cancer Brother        Social History:   TOBACCO:   reports that she quit smoking about 21 years ago. Her smoking use included cigarettes. She has a 40.00 pack-year smoking history. She has never used smokeless tobacco.  ETOH:   reports current alcohol use. Allergies:  Benadryl [diphenhydramine], Iv dye [iodides], and Pcn [penicillins]    Medications Prior to Admission:    Prior to Admission medications    Medication Sig Start Date End Date Taking?  Authorizing Provider   PREDNISONE PO Take by mouth daily    Yes Historical Provider, MD   leflunomide (ARAVA) 20 MG tablet Take 20 mg by mouth daily 7/13/21  Yes Historical Provider, MD   latanoprost (XALATAN) 0.005 % ophthalmic solution Place 1 drop into both eyes nightly Yes Historical Provider, MD   albuterol sulfate HFA (VENTOLIN HFA) 108 (90 Base) MCG/ACT inhaler Inhale 2 puffs into the lungs every 6 hours as needed for Wheezing Dx code: J44.9 6/15/21  Yes ESTELLE Capone CNP   tiotropium (SPIRIVA HANDIHALER) 18 MCG inhalation capsule INHALE THE CONTENTS OF 1  CAPSULE BY MOUTH VIA  HANDIHALER DAILY 4/26/21  Yes ESTELLE Capone CNP   atorvastatin (LIPITOR) 40 MG tablet Take 1 tablet by mouth daily 2/11/21  Yes ESTELLE Mendoza CNP   pantoprazole (PROTONIX) 20 MG tablet Take 1 tablet by mouth daily TAKE 1 TABLET BY MOUTH  DAILY 2/11/21  Yes ESTELLE Mendoza CNP   rOPINIRole (REQUIP) 0.5 MG tablet Take 2 tablets by mouth daily 2/11/21  Yes ESTELLE Mendoza CNP   nitroGLYCERIN (NITROSTAT) 0.4 MG SL tablet DISSOLVE 1 TABLET UNDER THE TONGUE EVERY 5 MINUTES AS  NEEDED FOR CHEST PAIN. MAX  OF 3 TABLETS IN 15 MINUTES. CALL 911 IF PAIN PERSISTS. 10/19/20  Yes Vickie Cyr MD   famciclovir (FAMVIR) 250 MG tablet Take 250 mg by mouth 2 times daily   Yes Historical Provider, MD   timolol (BETIMOL) 0.5 % ophthalmic solution Place 1 drop into the right eye 2 times daily   Yes Historical Provider, MD   RESTASIS 0.05 % ophthalmic emulsion Place 1 drop into both eyes 2 times daily as needed  9/29/17  Yes Historical Provider, MD   aspirin 81 MG tablet Take 81 mg by mouth daily. Yes Historical Provider, MD   fluticasone-salmeterol [facility] INHUB) 250-50 MCG/DOSE AEPB Inhale 1 puff into the lungs every 12 hours 4/26/21   ESTELLE Capone CNP       Review of Systems:  Constitutional: Positive for activity change. Negative for diaphoresis and fever. HENT: Negative for congestion. Respiratory: Positive for shortness of breath. Negative for cough, choking and wheezing. Cardiovascular: Positive for chest pain. Negative for palpitations and leg swelling. Gastrointestinal: Positive for nausea.  Negative for abdominal distention, abdominal pain, constipation, diarrhea and vomiting. Genitourinary: Negative for dysuria. Musculoskeletal: Negative for back pain and neck pain. Skin: Negative. Neurological: Negative for dizziness, light-headedness and headaches. Psychiatric/Behavioral: Negative for confusion, decreased concentration and dysphoric mood.           Physical Exam:    Vitals:   Temp: 96.8 °F (36 °C)  BP: (!) 163/80  Resp: 16  Pulse: 88  SpO2: 97 %  24HR INTAKE/OUTPUT:      Intake/Output Summary (Last 24 hours) at 8/6/2021 1614  Last data filed at 8/6/2021 1430  Gross per 24 hour   Intake 1959.21 ml   Output 2500 ml   Net -540.79 ml       Exam:  GEN:  alert & appropriate appearance  EYES: No gross abnormalities. NECK: normal, supple, supple with no lymphadenopathy,  no carotid bruits, no bruits noted  PULM: clear to auscultation bilaterally- no wheezes, rales or rhonchi, normal air movement, no respiratory distress and clear with no rales or rhonchi.  No wheezes  COR: regular rate & rhythm and regular with rate and rhythm  ABD:  soft & NT, No distension  EXT:   no edema noted  NEURO: follows commands, BOB, no deficits  SKIN:  negative  -----------------------------------------------------------------  Diagnostic Data:   Lab Results   Component Value Date    WBC 4.6 08/06/2021    HGB 12.2 08/06/2021     08/06/2021       Lab Results   Component Value Date    BUN 12 08/05/2021    CREATININE 0.50 08/05/2021     08/05/2021    K 4.1 08/05/2021    CALCIUM 8.9 08/05/2021     (H) 08/05/2021    CO2 23 08/05/2021    LABGLOM >60 08/05/2021       Lab Results   Component Value Date    WBCUA 0 TO 2 06/26/2019    RBCUA 0 TO 2 06/26/2019    EPITHUA 0 TO 2 06/26/2019    LEUKOCYTESUR NEGATIVE 06/26/2019    SPECGRAV 1.015 06/26/2019    GLUCOSEU NEGATIVE 06/26/2019    KETUA NEGATIVE 06/26/2019    PROTEINU NEGATIVE 06/26/2019    HGBUR TRACE (A) 06/26/2019    CASTUA NOT REPORTED 06/26/2019    CRYSTUA NOT REPORTED 06/26/2019    BACTERIA TRACE (A) 06/26/2019    YEAST NOT REPORTED 06/26/2019       Lab Results   Component Value Date    MYOGLOBIN <21 (L) 12/11/2014    TROPONINT NOT REPORTED 08/06/2021    CKMB 1.4 08/18/2013       ECHO-2D-M-MODE COMPLETE    Result Date: 8/6/2021  [facility] Transthoracic Echocardiography Report (TTE)  Patient Name ROSIE GUEVARA     Date of Study               08/06/2021               Drake Spireagan   Date of      1950  Gender                      Female  Birth   Age          79 year(s)  Race                           Room Number  3876        Height:                     63 inch, 160.02 cm   Corporate ID [id number]    Weight:                     138 pounds, 62.6 kg  #   Patient Acct [de-identified]   BSA:          1.65 m^2      BMI:      24.45  #                                                              kg/m^2   MR #         [id number]      Sonographer                 Rusty Quirosher   Accession #  [id number]  Interpreting Physician      Dayanara Ramírez   Fellow                   Referring Nurse                           Practitioner   Interpreting             Referring Physician         Dayanara Ramírez  Fellow  Type of Study   TTE procedure:2D Echocardiogram, M-Mode, Doppler, Color Doppler. Procedure Date Date: 08/06/2021 Start: 09:15 AM Study Location: [facility] Indications:Chest pain. History / Tech. Comments: Chest pain PMHX: CAD, Stent Patient Status: Inpatient Height: 63 inches Weight: 138 pounds BSA: 1.65 m^2 BMI: 24.45 kg/m^2 BP: 154/99 mmHg Allergies   - Penicillin. - Contrast.   - *Unlisted:(BENADRYL). CONCLUSIONS Summary Global left ventricular systolic function appears preserved with an estimated ejection fraction of >55%. The left ventricular cavity size is within normal limits and the left ventricular wall thickness is mildly increased. No definite specific wall motion abnormalities were identified. Aortic valve is sclerotic but opens well. Mild to moderate aortic regurgitation.  Evidence of mild (grade I) diastolic dysfunction is seen. An anterior echo free space is seen suggestive of a small effusion or fat pad. Compared to the previous study of 2020, no significant change was seen. Signature ----------------------------------------------------------------------------  Electronically signed by Renetta Quiros(Sonographer) on 2021 11:03  AM ---------------------------------------------------------------------------- ----------------------------------------------------------------------------  Electronically signed by Dayanara Ramírez(Interpreting physician) on 2021  02:40 PM ---------------------------------------------------------------------------- FINDINGS Left Atrium Left atrium is normal in size. Left Ventricle Global left ventricular systolic function appears preserved with an estimated ejection fraction of >55%. The left ventricular cavity size is within normal limits and the left ventricular wall thickness is mildly increased. No definite specific wall motion abnormalities were identified. Right Atrium Right atrium is normal in size. Right Ventricle Normal right ventricular size and function. Mitral Valve Normal mitral valve structure and function. Aortic Valve Aortic valve is sclerotic but opens well. Mild to moderate aortic regurgitation. Tricuspid Valve Normal tricuspid valve structure and function. Pulmonic Valve The pulmonic valve is normal in structure. Pericardial Effusion An anterior echo free space is seen suggestive of a small effusion or fat pad. Miscellaneous Evidence of mild (grade I) diastolic dysfunction is seen. Normal aortic root dimension.  M-mode / 2D Measurements & Calculations:   LVIDd:3.56 cm(3.7 - 5.6 cm)      Diastolic GICAFP:65.43 ml  LVIDs:2.69 cm(2.2 - 4.0 cm)      Systolic RTISHI:76.53 ml  IVSd:1.01 cm(0.6 - 1.1 cm)       Aortic Root:2.49 cm(2.0 - 3.7 cm)  LVPWd:1.05 cm(0.6 - 1.1 cm)      LA Dimension: 3.88 cm(1.9 - 4.0 cm)  Fractional Shortenin.44 %    LA volume/Index: 24.3 ml /15m^2  Calculated LVEF (%): 56.62 %     AV Cusp Separation: 1.78 cm   Mitral:                                 Aortic   Valve Area (P1/2-Time): 3.35 cm^2       Peak Velocity: 1.86 m/s  Peak E-Wave: 0.70 m/s                   Mean Velocity: 1.25 m/s  Peak A-Wave: 0.93 m/s                   Peak Gradient: 13.81 mmHg  E/A Ratio: 0.75                         Mean Gradient: 7.3 mmHg  Peak Gradient: 1.95 mmHg                                          Acceleration Time: 66.3 msec  P1/2t: 65.69 msec                                          AV VTI: 40.04 cm  Diastology / Tissue Doppler Lateral Wall E' velocity:0.08 m/s Lateral Wall E/E':9.55    XR CHEST PORTABLE    Result Date: 8/5/2021  EXAMINATION: ONE XRAY VIEW OF THE CHEST 8/5/2021 10:09 am COMPARISON: 11/23/2020, 06/03/2020 HISTORY: ORDERING SYSTEM PROVIDED HISTORY: cp TECHNOLOGIST PROVIDED HISTORY: cp FINDINGS: The cardiomediastinal silhouette is unchanged in appearance. There is no consolidation, pneumothorax, or evidence of edema. No effusion is appreciated. The osseous structures are unchanged in appearance. Unchanged appearance of the chest without acute airspace disease identified. Assessment:    Principal Problem:    Chest pain in adult  Active Problems:    Coronary vasospasm (HCC)    ASHD (arteriosclerotic heart disease)    Unstable angina (HCC)  Resolved Problems:    * No resolved hospital problems.  *      Patient Active Problem List    Diagnosis Date Noted    Non-cardiac chest pain 12/14/2020    Coronary vasospasm ([address] 11/12/2018    Abnormal cardiovascular stress test 08/09/2018    Chest pain in adult 08/06/2021    Stage 2 moderate COPD by GOLD classification ([address] 05/27/2020    Essential hypertension 10/14/2019    Shortness of breath on exertion 12/18/2017    Superficial bruising of lower leg 08/23/2016    Reactive airway disease that is not asthma 01/13/2016    Acute reaction to stress 02/23/2015    Chest pain, atypical 2014    GERD (gastroesophageal reflux disease) 2014    Unstable angina ([facility] 2013    Hyperlipidemia with target LDL less than 70 2013    ASHD (arteriosclerotic heart disease)        Plan:     ·  observation admission due to coronary disease with a history of chest pain recent  · Factors affecting the medical complexity of this patient include Principal Problem:  ·   Chest pain in adult  · Active Problems:  ·   Coronary vasospasm (HCC)  ·   ASHD (arteriosclerotic heart disease)  ·   Unstable angina ([facility]  · Resolved Problems:  ·   * No resolved hospital problems. *  ·   · Estimated length of stay is 1 days  ·  stress test, risk stratification, likely restart supportive medicines. If stress is negative. · High risk medication monitorin    CORE MEASURES  Core measures including DVT prophylaxis, Code Status, Nutrition, Therapy Options, chart reviewed and advance directives reviewed at this visit.     Tyrone Fung MD, MD  2021, 4:14 PM

## 2021-08-06 NOTE — PROGRESS NOTES
Initial shift assessment done and VS obtained as charted in flow sheet. Pt is A&Ox4, answers questions appropriately. Pt has been denying chest pain. Cardiology consult faxed and called. Pt has had visitors at bedside. Initial BP 86/62 but improved to 93/62 when rechecked. Medications where administered as ordered. Pt telemetry continues to kick out of lead II, but revealed a junctional rhythm with inverted p-waves. EKG obtained to verify if any changes were noted, revealed NSR. EKG patches changed. Pt denies any other needs at this time. Call light and bedside table remain within reach. Will continue to monitor.

## 2021-08-06 NOTE — PROGRESS NOTES
Patient is showing no s/s of allergy reaction to IV dye at this time. Educated on importance of alerting staff to any new s/s of reaction.

## 2021-08-06 NOTE — PROGRESS NOTES
Pt continued to have low BP. Writer had contacted Dr. Asa Arauz to address, order was received for 250 ml bolus which has been administered. Pt noted to have significant difference in right extremity to left extremity. Prior to bolus left upper arm was 83/52 and improved to 93/58. Telemetry continued to intermittently not read leads so new telemetry was placed on patient and able to view lead II showing NSR. Medications were administered for evening as ordered. Pt has denied any other needs. Will continue to monitor.

## 2021-08-06 NOTE — PROGRESS NOTES
Comprehensive Nutrition Assessment    Type and Reason for Visit:  Initial    Nutrition Recommendations/Plan:   1. Continue NPO diet. 2. Progress to cardiac diet when medically appropriate. Nutrition Assessment:  Inadequate oral intakes r/t cardiac dysfunction aeb NPO for stress test. Pt denied any weight or PO changes. States she \"tries\" to follow a heart healthy diet.      Malnutrition Assessment:  Malnutrition Status:  No malnutrition    Context:  Acute Illness     Findings of the 6 clinical characteristics of malnutrition:  Energy Intake:  No significant decrease in energy intake  Weight Loss:  No significant weight loss     Body Fat Loss:  No significant body fat loss     Muscle Mass Loss:  No significant muscle mass loss    Fluid Accumulation:  No significant fluid accumulation     Strength:  Not Performed      Nutrition Related Findings:  appears well nourished       Wounds:  None       Current Nutrition Therapies:    Diet NPO    Anthropometric Measures:  · Height: 5' 3\" (160 cm)  · Current Body Weight: 138 lb 12.8 oz (63 kg)   · Admission Body Weight: 138 lb 12.8 oz (63 kg)    · Usual Body Weight: 129 lb (58.5 kg) (12/28/20)     · Ideal Body Weight: 115 lbs; % Ideal Body Weight 120.7 %   · BMI: 24.6  · BMI Categories: Normal Weight (BMI 22.0 to 24.9) age over 72       Nutrition Diagnosis:   · Inadequate oral intake related to cardiac dysfunction as evidenced by NPO or clear liquid status due to medical condition      Nutrition Interventions:   Food and/or Nutrient Delivery:  Continue NPO  Nutrition Education/Counseling:  Education declined   Coordination of Nutrition Care:  Continue to monitor while inpatient    Goals:  PO > 75% of meals when oral intakes resumed       Recent Labs     08/05/21  1026      K 4.1   *   CO2 23   BUN 12   CREATININE 0.50   GLUCOSE 81   GFR                 Lab Results   Component Value Date    LABALBU 4.4 08/25/2020      Nutrition Monitoring and Evaluation:   Behavioral-Environmental Outcomes:  None Identified   Food/Nutrient Intake Outcomes:  Diet Advancement/Tolerance  Physical Signs/Symptoms Outcomes:  Biochemical Data, Weight     Discharge Planning:     Too soon to determine     Electronically signed by Ash Lee RD, LD on 8/6/21 at 9:44 AM EDT    Contact: [phone]

## 2021-08-25 ENCOUNTER — OFFICE VISIT (OUTPATIENT)
Dept: CARDIOLOGY | Age: 71
End: 2021-08-25
Payer: MEDICARE

## 2021-08-25 VITALS
DIASTOLIC BLOOD PRESSURE: 83 MMHG | OXYGEN SATURATION: 98 % | HEIGHT: 63 IN | HEART RATE: 83 BPM | WEIGHT: 138 LBS | BODY MASS INDEX: 24.45 KG/M2 | RESPIRATION RATE: 16 BRPM | SYSTOLIC BLOOD PRESSURE: 151 MMHG

## 2021-08-25 DIAGNOSIS — I10 ESSENTIAL HYPERTENSION: ICD-10-CM

## 2021-08-25 DIAGNOSIS — I35.1 MODERATE AORTIC REGURGITATION: ICD-10-CM

## 2021-08-25 DIAGNOSIS — I49.3 PVC (PREMATURE VENTRICULAR CONTRACTION): Primary | ICD-10-CM

## 2021-08-25 DIAGNOSIS — K21.00 GASTROESOPHAGEAL REFLUX DISEASE WITH ESOPHAGITIS WITHOUT HEMORRHAGE: ICD-10-CM

## 2021-08-25 DIAGNOSIS — R07.89 CHEST PAIN, ATYPICAL: ICD-10-CM

## 2021-08-25 DIAGNOSIS — I25.10 ASHD (ARTERIOSCLEROTIC HEART DISEASE): ICD-10-CM

## 2021-08-25 DIAGNOSIS — I20.1 CORONARY VASOSPASM (HCC): ICD-10-CM

## 2021-08-25 DIAGNOSIS — E78.2 MIXED HYPERLIPIDEMIA: ICD-10-CM

## 2021-08-25 PROCEDURE — G8399 PT W/DXA RESULTS DOCUMENT: HCPCS | Performed by: FAMILY MEDICINE

## 2021-08-25 PROCEDURE — 1123F ACP DISCUSS/DSCN MKR DOCD: CPT | Performed by: FAMILY MEDICINE

## 2021-08-25 PROCEDURE — G8420 CALC BMI NORM PARAMETERS: HCPCS | Performed by: FAMILY MEDICINE

## 2021-08-25 PROCEDURE — 3017F COLORECTAL CA SCREEN DOC REV: CPT | Performed by: FAMILY MEDICINE

## 2021-08-25 PROCEDURE — 99214 OFFICE O/P EST MOD 30 MIN: CPT | Performed by: FAMILY MEDICINE

## 2021-08-25 PROCEDURE — 4040F PNEUMOC VAC/ADMIN/RCVD: CPT | Performed by: FAMILY MEDICINE

## 2021-08-25 PROCEDURE — 1036F TOBACCO NON-USER: CPT | Performed by: FAMILY MEDICINE

## 2021-08-25 PROCEDURE — 99211 OFF/OP EST MAY X REQ PHY/QHP: CPT | Performed by: FAMILY MEDICINE

## 2021-08-25 PROCEDURE — 1090F PRES/ABSN URINE INCON ASSESS: CPT | Performed by: FAMILY MEDICINE

## 2021-08-25 PROCEDURE — G8428 CUR MEDS NOT DOCUMENT: HCPCS | Performed by: FAMILY MEDICINE

## 2021-08-25 RX ORDER — RANOLAZINE 1000 MG/1
1000 TABLET, EXTENDED RELEASE ORAL 2 TIMES DAILY
Qty: 180 TABLET | Refills: 3 | Status: SHIPPED | OUTPATIENT
Start: 2021-08-25 | End: 2021-11-11

## 2021-08-25 RX ORDER — PANTOPRAZOLE SODIUM 40 MG/1
40 TABLET, DELAYED RELEASE ORAL DAILY
Qty: 30 TABLET | Refills: 5 | Status: CANCELLED | OUTPATIENT
Start: 2021-08-25

## 2021-08-25 RX ORDER — PANTOPRAZOLE SODIUM 40 MG/1
40 TABLET, DELAYED RELEASE ORAL DAILY
Qty: 90 TABLET | Refills: 3 | Status: SHIPPED | OUTPATIENT
Start: 2021-08-25

## 2021-08-25 NOTE — PROGRESS NOTES
Markus Voss am scribing for and in the presence of Jennie Dorado. Bud BECKMAN, MS, F.A.C.C..    Patient: Marci Louise  :   Date of Visit: 2021    REASON FOR VISIT / CONSULTATION: Follow up for CAD, Angina    Dear Cari Mendoza, APRN - CNP,    I had the pleasure of seeing your patient Marci Louise in followup today. As you know, Ms. Marquez is a 79 y.o. female with a history of coronary artery disease with a total of 4 stents, most recently in 2012. In 10/12 she had a repeat heart catheterization with a bit of a complication including severe chest pain after a left ventriculogram but this resolved. Ms. Moni Elizondo underwent a repeat stress test in  which was abnormal and on her heart catheterization done 2018 she developed chest pressure as well as ST changes on left main engagement suspicious for coronary vasospasm. Her echocardiogram done 2018 shows moderate aortic regurgitation. Echo done on 2021 showed ejection fraction of >55%. The left ventricular cavity size is within normal limits and the left ventricular wall thickness is mildly increased. No definite specific wall motion abnormalities were identified. Aortic valve is sclerotic but opens well. Mild to moderate aortic regurgitation. Evidence of mild (grade I) diastolic dysfunction is seen. An anterior echo free space is seen suggestive of a small effusion or fat Pad. Stress test done on 2021 was largely abnormal, most consistent with low risk for ischemia. Ms. Moni Elizondo is here for a follow up. She was in the hospital due to chest pain. She said the pain lasted two hours and she had never felt pain like that before. She has not had any chest pain since that episode. She said the morning she had chest pain without any aggravating or alleviating factors. She said she couldn't lay down and she couldn't stand up, or find any position that was comfortable.  She does note a history of reflux and takes ischemia, a LISA was inserted    Hx of percutaneous left heart catheterization 2012    LMCA & LAD -Normal, LCXMild luminal irregularities, Patent prior stent, RCA-Patent stents in Prox and mid segment    Hyperlipidemia     Normal nuclear stress test 2012       CURRENT ALLERGIES: Benadryl [diphenhydramine], Iv dye [iodides], and Pcn [penicillins] REVIEW OF SYSTEMS: 14 systems were reviewed. Pertinent positives and negatives as above, all else negative. Past Surgical History:   Procedure Laterality Date    [facility]    [facility] significant coronary artery disease. Widely patent RCA stent.  However, the patient did have pretty severe ST depression on left main engagement with moderate chest pressure that resolved on disengagement    CARPAL [address]      disc replaced in neck    Social History:  Social History     Tobacco Use    Smoking status: Former Smoker     Packs/day: 2.00     Years: 20.00     Pack years: 40.00     Types: Cigarettes     Quit date: 2000     Years since quittin.3    Smokeless tobacco: Never Used   Vaping Use    Vaping Use: Never used   Substance Use Topics    Alcohol use: Yes     Comment: social    Drug use: No        CURRENT MEDICATIONS:  Outpatient Medications Marked as Taking for the 21 encounter (Office Visit) with Sushma Pena MD   Medication Sig Dispense Refill    ranolazine (RANEXA) 500 MG extended release tablet Take 1 tablet by mouth 2 times daily 60 tablet 3    PREDNISONE PO Take by mouth daily       leflunomide (ARAVA) 20 MG tablet Take 20 mg by mouth daily      latanoprost (XALATAN) 0.005 % ophthalmic solution Place 1 drop into both eyes nightly       albuterol sulfate HFA (VENTOLIN HFA) 108 (90 Base) MCG/ACT inhaler Inhale 2 puffs into the lungs every 6 hours as needed for Wheezing Dx code: J44.9 1 Inhaler 3    fluticasone-salmeterol (WIXELA INHUB) 250-50 MCG/DOSE AEPB Inhale 1 puff into the lungs every 12 hours 60 each 3    tiotropium (SPIRIVA HANDIHALER) 18 MCG inhalation capsule INHALE THE CONTENTS OF 1  CAPSULE BY MOUTH VIA  HANDIHALER DAILY 90 capsule 3    atorvastatin (LIPITOR) 40 MG tablet Take 1 tablet by mouth daily 90 tablet 1    pantoprazole (PROTONIX) 20 MG tablet Take 1 tablet by mouth daily TAKE 1 TABLET BY MOUTH  DAILY 90 tablet 1    rOPINIRole (REQUIP) 0.5 MG tablet Take 2 tablets by mouth daily 180 tablet 1    nitroGLYCERIN (NITROSTAT) 0.4 MG SL tablet DISSOLVE 1 TABLET UNDER THE TONGUE EVERY 5 MINUTES AS  NEEDED FOR CHEST PAIN. MAX  OF 3 TABLETS IN 15 MINUTES. CALL 911 IF PAIN PERSISTS. 75 tablet 4    famciclovir (FAMVIR) 250 MG tablet Take 250 mg by mouth 2 times daily      timolol (BETIMOL) 0.5 % ophthalmic solution Place 1 drop into the right eye 2 times daily      RESTASIS 0.05 % ophthalmic emulsion Place 1 drop into both eyes 2 times daily as needed       aspirin 81 MG tablet Take 81 mg by mouth daily. FAMILY HISTORY: family history includes Cancer in her brother and mother. PHYSICAL EXAM:   BP (!) 151/83 (Site: Right Upper Arm, Position: Sitting, Cuff Size: Medium Adult)   Pulse 83   Resp 16   Ht 5' 3\" (1.6 m)   Wt 138 lb (62.6 kg)   LMP  (LMP Unknown)   SpO2 98%   BMI 24.45 kg/m²  Body mass index is 24.45 kg/m². Constitutional: She is oriented to person, place, and time. She appears well-developed and well-nourished. In no acute distress. HEENT: Normocephalic and atraumatic. No JVD present. Carotid bruit is not present. No mass and no thyromegaly present. No lymphadenopathy present. Cardiovascular: Normal rate, regular rhythm, normal heart sounds. Exam reveals no gallop and no friction rubs. 1/6 systolic murmur, 5th intercostal space on the LEFT in the mid-clavicular line (cardiac apex).    Pulmonary/Chest: Effort normal and breath sounds normal. No respiratory distress. She has no wheezes, rhonchi or rales. Abdominal: Soft, non-tender. Bowel sounds and aorta are normal. She exhibits no organomegaly, mass or bruit. Extremities: No edema. No cyanosis and no clubbing. Pulses are 2+ radial and carotid pulses. 2+ dorsalis pedis and posterior tibial pulses bilaterally. Neurological: She is alert and oriented to person, place, and time. No evidence of gross cranial nerve deficit. Coordination appeared normal.   Skin: Skin is warm and dry. There is no rash or diaphoresis. Psychiatric: She has a normal mood and affect. Her speech is normal and behavior is normal.      MOST RECENT LABS ON RECORD:   Lab Results   Component Value Date    WBC 4.6 08/06/2021    HGB 12.2 08/06/2021    HCT 37.9 08/06/2021     08/06/2021    CHOL 182 08/06/2021    TRIG 227 (H) 08/06/2021    HDL 52 08/06/2021    LDLCHOLESTEROL 85 08/06/2021    ALT 22 07/01/2021    AST 22 07/01/2021     08/05/2021    K 4.1 08/05/2021     (H) 08/05/2021    CREATININE 0.50 08/05/2021    BUN 12 08/05/2021    CO2 23 08/05/2021    TSH 2.86 08/25/2020    INR 0.9 07/14/2019        ASSESSMENT:  Encounter Diagnoses   Name Primary?  PVC (premature ventricular contraction) Yes    ASHD (arteriosclerotic heart disease)     Coronary vasospasm (HCC)     Essential hypertension     Mixed hyperlipidemia     Moderate aortic regurgitation     Chest pain, atypical     Gastroesophageal reflux disease with esophagitis without hemorrhage       PLAN:  · Atypical chest pain with known history of coronary vasospasm: largely normal stress test on 8/5/2021, also has a long history of GERD and symptoms could be consistent with esophageal spasm due to esophagitis. · Anti-anginal medications: INCREASE to ranolazine (Ranexa) 1000 mg twice daily.  I also discussed the potential side effects of this medication including lightheadedness and dizziness and instructed them to stop the medication of this occurs and call our office if this occurs. · Anti-anginal medications: Continue nitroglycerin 0.4 mg tablets as needed for chest pain. · I did advise her that her most recent episode is mostly likely due to her acid reflux. I want her to Increase her Protonix to 40 mg daily for one month and then she can go back down to her 20 mg daily. · Atherosclerotic Heart Disease: Hx: Stents in 2012  Antiplatelet Agent: Continue Aspirin 81 mg daily. Beta Blocker: Unable to tolerate   Calcium Channel Blocker: Not indicated at this time. · Statin Therapy: Continue atorvastatin (Lipitor) 40 mg nightly. · Coronary Vasospasm: Evidence on Heart Catheterization 8/2018. largely normal stress test on 8/5/2021  · Calcium Channel Blocker: Not indicated  · Anti-anginal medications: INCREASE to ranolazine (Ranexa) 1000 mg twice daily. I also discussed the potential side effects of this medication including lightheadedness and dizziness and instructed them to stop the medication of this occurs and call our office if this occurs. Essential Hypertension: Controlled   · Beta Blocker: Unable to tolerate   · ACE Inibitor/ARB: Not indicated at this time. · Diuretics: Not indicated at this time. · Calcium Channel Blocker: Not indicated at this time. · Additional Testing List: None     · Hyperlipidemia: Mixed LDL: 85 on 8/6/2021  · Statin Therapy: Continue atorvastatin (Lipitor) 40 mg nightly. · Moderate aortic regurgitation shown on echocardiogram done on 8/5/2021    In the meantime, I encouraged her to continue all of her current medications and follow up with you as previously scheduled. FOLLOW UP:   I told Ms. Marquez to call my office if she had any problems, but otherwise told her to Return in about 6 months (around 2/25/2022). However, I would be happy to see her sooner should the need arise. Once again, thank you for allowing me to participate in this patients care.  Please do not hesitate to contact me could I be of further assistance. Sincerely,  Juan Carlos Farrell MD, MS, F.A.C.C. [facility], [address], [address]  Phone: [phone], Fax: [phone]     I believe that the risk of significant morbidity and mortality related to the patient's current medical conditions are: Intermediate. The documentation recorded by the scribe, accurately and completely reflects the services I personally performed and the decisions made by me. Kiara Vallejo MD, MS, F.A.C.C.  August 25, 2021

## 2021-08-25 NOTE — PATIENT INSTRUCTIONS
SURVEY:    You may be receiving a survey from [employer] regarding your visit today. Please complete the survey to enable us to provide the highest quality of care to you and your family. If you cannot score us a very good on any question, please call the office to discuss how we could have made your experience a very good one. Thank you.

## 2021-09-20 ENCOUNTER — HOSPITAL ENCOUNTER (OUTPATIENT)
Dept: GENERAL RADIOLOGY | Age: 71
Discharge: HOME | End: 2021-09-22
Payer: MEDICARE

## 2021-09-20 ENCOUNTER — OFFICE VISIT (OUTPATIENT)
Dept: NEUROLOGY | Age: 71
End: 2021-09-20
Payer: MEDICARE

## 2021-09-20 ENCOUNTER — HOSPITAL ENCOUNTER (OUTPATIENT)
Age: 71
Discharge: HOME | End: 2021-09-22
Payer: MEDICARE

## 2021-09-20 VITALS
WEIGHT: 138.1 LBS | SYSTOLIC BLOOD PRESSURE: 130 MMHG | DIASTOLIC BLOOD PRESSURE: 63 MMHG | BODY MASS INDEX: 24.47 KG/M2 | TEMPERATURE: 97.5 F | RESPIRATION RATE: 18 BRPM | HEART RATE: 85 BPM | HEIGHT: 63 IN

## 2021-09-20 DIAGNOSIS — M79.605 PAIN IN BOTH LOWER EXTREMITIES: ICD-10-CM

## 2021-09-20 DIAGNOSIS — M79.605 PAIN IN BOTH LOWER EXTREMITIES: Primary | ICD-10-CM

## 2021-09-20 DIAGNOSIS — M79.604 PAIN IN BOTH LOWER EXTREMITIES: ICD-10-CM

## 2021-09-20 DIAGNOSIS — M79.604 PAIN IN BOTH LOWER EXTREMITIES: Primary | ICD-10-CM

## 2021-09-20 PROCEDURE — 4040F PNEUMOC VAC/ADMIN/RCVD: CPT | Performed by: NEUROMUSCULOSKELETAL MEDICINE, SPORTS MEDICINE

## 2021-09-20 PROCEDURE — G8427 DOCREV CUR MEDS BY ELIG CLIN: HCPCS | Performed by: NEUROMUSCULOSKELETAL MEDICINE, SPORTS MEDICINE

## 2021-09-20 PROCEDURE — G8420 CALC BMI NORM PARAMETERS: HCPCS | Performed by: NEUROMUSCULOSKELETAL MEDICINE, SPORTS MEDICINE

## 2021-09-20 PROCEDURE — 1036F TOBACCO NON-USER: CPT | Performed by: NEUROMUSCULOSKELETAL MEDICINE, SPORTS MEDICINE

## 2021-09-20 PROCEDURE — 1090F PRES/ABSN URINE INCON ASSESS: CPT | Performed by: NEUROMUSCULOSKELETAL MEDICINE, SPORTS MEDICINE

## 2021-09-20 PROCEDURE — 3017F COLORECTAL CA SCREEN DOC REV: CPT | Performed by: NEUROMUSCULOSKELETAL MEDICINE, SPORTS MEDICINE

## 2021-09-20 PROCEDURE — 72100 X-RAY EXAM L-S SPINE 2/3 VWS: CPT

## 2021-09-20 PROCEDURE — 1123F ACP DISCUSS/DSCN MKR DOCD: CPT | Performed by: NEUROMUSCULOSKELETAL MEDICINE, SPORTS MEDICINE

## 2021-09-20 PROCEDURE — 99204 OFFICE O/P NEW MOD 45 MIN: CPT | Performed by: NEUROMUSCULOSKELETAL MEDICINE, SPORTS MEDICINE

## 2021-09-20 PROCEDURE — 99214 OFFICE O/P EST MOD 30 MIN: CPT | Performed by: NEUROMUSCULOSKELETAL MEDICINE, SPORTS MEDICINE

## 2021-09-20 PROCEDURE — G8399 PT W/DXA RESULTS DOCUMENT: HCPCS | Performed by: NEUROMUSCULOSKELETAL MEDICINE, SPORTS MEDICINE

## 2021-09-20 RX ORDER — GABAPENTIN 300 MG/1
300 CAPSULE ORAL 2 TIMES DAILY
Qty: 60 CAPSULE | Refills: 1 | Status: SHIPPED | OUTPATIENT
Start: 2021-09-20 | End: 2021-10-04

## 2021-09-20 NOTE — PROGRESS NOTES
NEUROLOGY CONSULT    Patient Name:  Anthony De Leon  :     Clinic Visit Date: 2021    I saw Ms. Anthony De Leon  in the neurology clinic today for evaluation of pain and paresthesia. 79year-old right-handed lady with a history of COPD, glaucoma, CAD with stents, rheumatoid arthritis, cervical degenerative disease status post surgery many years ago, diagnosis of restless leg syndrome for which she takes Requip, presents to the office today with complaints of persistent pain, numbness and tingling in both the lower extremities over the past 2 months. Symptoms apparently started spontaneously without trauma and are predominantly in the feet, manifesting a sensation of tingling, burning pain and cramp-like pain in both the feet. Denies lower back pain or lumbar radicular symptoms. No bladder symptoms or weakness in the lower extremities. Complains of intermittent left-sided neck pain and a burning sensation across the left shoulder whenever she turns her neck to the left side. No weakness in the upper extremities. Symptoms of restless leg syndrome, a problem which she has had for many years has been under fairly good control with ropinirole. History of bilateral carpal tunnel surgery many years ago. REVIEW OF SYSTEMS    Constitutional Weight changes: absent, change in appetite: absent Fatigue: absent; Fevers : absent, Any recent hospitalizations:  absent   HEENT Ears: normal,  Visual disturbance: absent   Respiratory Shortness of breath: absent, choking:  absent, Cough: absent, Snoring : absent   Cardiovascular Chest pain: absent, Leg swelling :absent, palpitations : absent, fainting : absent   GI Constipation: absent, Diarrhea: absent, Swallowing change: absent    Urinary frequency: absent, Urinary urgency: absent, Urinary incontinence: absent   Musculoskeletal Neck pain: absent, Back pain: absent, Stiffness: absent, Muscle pain: absent, Joint pain: absent, restless leg : present Dermatological Hair loss: absent, Skin changes: absent   Neurological Confusion: absent, Trouble concentrating: absent, Seizures: absent;  Memory loss: absent, balance problem: absent, Dizziness: absent, vertigo: absent, Weakness: absent, Numbness present, Tremor: absent, Spasm: absent, involuntary movement: absent, Speech difficulty: absent, Headache: absent, Light sensitivity: absent   Psychiatric Anxiety: absent, Depression  absent, drug abuse: absent, Hallucination: absent, mood disorder: absent, Suicidal ideations absent   Hematologic Abnormal bleeding: absent, Anemia: absent, Lymph gland changes: absent Clotting disorder: absent     Past Medical History:   Diagnosis Date    Arthritis     CAD (coronary artery disease) 4/11, 9/12    4 total stents. 2 Stents in RCA, 1 in small OM1.  H/O cardiovascular stress test 5/22/13, 6/25/14    Relatively normal without evidence of significant ischemia or infarction. global LV systolic function was normal w/o regional wall motion abnormalities. No significant EKG evidence of ischemia during monitoring w/o significant associated arrhythmias. Tay score is 5.     H/O cardiovascular stress test 12/28/2017    Larglely normal myocardial perfusion imaging with soft tissue artifact but w/o evidence of significant myocardial ischemia or infarction. results are most consistent with low/intermediate risk for significant CAD    H/O echocardiogram 5/21/13, 6/24/14    EF>55%, mild concentric LVH    History of echocardiogram 11/27/2018    EF of 60%. evidence of moderate diastolic dysfunction is seen.      Hx of cardiac catheterization 08/09/2018    MIld CAD w/o any significant focal stenosis with a normal LV end diastolic pressure LVEDP interestingly the pt developed pretty pronounced ST depression on Lt main engagment with mod chest pressure which resolved o disengagement of the LT main    Hx of heart artery stent 10/06/2011    Lesion of Prox RCA 75% stenosis-Drug Eluting stent to RCA    Hx of percutaneous left heart catheterization 2011    LMCA, LAD & RAMUS- normal, LCX-lesion on Mid CX 40% stenosis, RCA-Patent stent, EF 60%    Hx of percutaneous left heart catheterization 2012    Normal renals, Patent RCA stents with mid LCX stenosis IVUS suggest 60-70% stenosis and due to continues ischemia, a LISA was inserted    Hx of percutaneous left heart catheterization 2012    LMCA & LAD -Normal, LCXMild luminal irregularities, Patent prior stent, RCA-Patent stents in Prox and mid segment    Hyperlipidemia     Normal nuclear stress test 2012       Past Surgical History:   Procedure Laterality Date    CARDIAC CATHETERIZATION Left 2018    right radial/ [employer] [facility]/ Dr John Ryder significant coronary artery disease. Widely patent RCA stent.  However, the patient did have pretty severe ST depression on left main engagement with moderate chest pressure that resolved on disengagement   [address]      disc replaced in neck       Social History     Socioeconomic History    Marital status:      Spouse name: Not on file    Number of children: Not on file    Years of education: Not on file    Highest education level: Not on file   Occupational History    Not on file   Tobacco Use    Smoking status: Former Smoker     Packs/day: 2.00     Years: 20.00     Pack years: 40.00     Types: Cigarettes     Quit date: 2000     Years since quittin.3    Smokeless tobacco: Never Used   Vaping Use    Vaping Use: Never used   Substance and Sexual Activity    Alcohol use: Yes     Comment: social    Drug use: No    Sexual activity: Not Currently   Other Topics Concern    Not on file   Social History Narrative    Not on file     Social Determinants of Health     Financial Resource Strain: Low Risk     Difficulty of Paying Living Expenses: Not hard at all   Food Insecurity: No Food Insecurity    Worried About Running Out of Food in the Last Year: Never true    Ran Out of Food in the Last Year: Never true   Transportation Needs: No Transportation Needs    Lack of Transportation (Medical): No    Lack of Transportation (Non-Medical): No   Physical Activity: Insufficiently Active    Days of Exercise per Week: 3 days    Minutes of Exercise per Session: 30 min   Stress:     Feeling of Stress :    Social Connections:     Frequency of Communication with Friends and Family:     Frequency of Social Gatherings with Friends and Family:     Attends [employer] Services:     Active Member of Clubs or Organizations:     Attends Club or Organization Meetings:     Marital Status:    Intimate Partner Violence:     Fear of Current or Ex-Partner:     Emotionally Abused:     Physically Abused:     Sexually Abused:        Family History   Problem Relation Age of Onset    Cancer Mother         breast cancer    Cancer Brother        Current Outpatient Medications   Medication Sig Dispense Refill    gabapentin (NEURONTIN) 300 MG capsule Take 1 capsule by mouth 2 times daily for 30 days.  Intended supply: 90 days 60 capsule 1    ranolazine (RANEXA) 1000 MG extended release tablet Take 1 tablet by mouth 2 times daily 180 tablet 3    pantoprazole (PROTONIX) 40 MG tablet Take 1 tablet by mouth daily TAKE 1 TABLET BY MOUTH  DAILY 90 tablet 3    PREDNISONE PO Take by mouth daily       leflunomide (ARAVA) 20 MG tablet Take 20 mg by mouth daily      latanoprost (XALATAN) 0.005 % ophthalmic solution Place 1 drop into both eyes nightly       albuterol sulfate HFA (VENTOLIN HFA) 108 (90 Base) MCG/ACT inhaler Inhale 2 puffs into the lungs every 6 hours as needed for Wheezing Dx code: J44.9 1 Inhaler 3    fluticasone-salmeterol (WIXELA INHUB) 250-50 MCG/DOSE AEPB Inhale 1 puff into the lungs every 12 hours 60 each 3    tiotropium (SPIRIVA HANDIHALER) 18 MCG inhalation capsule INHALE THE CONTENTS OF 1  CAPSULE BY MOUTH VIA  HANDIHALER DAILY 90 capsule 3    atorvastatin (LIPITOR) 40 MG tablet Take 1 tablet by mouth daily 90 tablet 1    rOPINIRole (REQUIP) 0.5 MG tablet Take 2 tablets by mouth daily 180 tablet 1    nitroGLYCERIN (NITROSTAT) 0.4 MG SL tablet DISSOLVE 1 TABLET UNDER THE TONGUE EVERY 5 MINUTES AS  NEEDED FOR CHEST PAIN. MAX  OF 3 TABLETS IN 15 MINUTES. CALL 911 IF PAIN PERSISTS. 75 tablet 4    famciclovir (FAMVIR) 250 MG tablet Take 250 mg by mouth 2 times daily      timolol (BETIMOL) 0.5 % ophthalmic solution Place 1 drop into the right eye 2 times daily      RESTASIS 0.05 % ophthalmic emulsion Place 1 drop into both eyes 2 times daily as needed       aspirin 81 MG tablet Take 81 mg by mouth daily. No current facility-administered medications for this visit. DATA:  Lab Results   Component Value Date    WBC 4.6 08/06/2021    HGB 12.2 08/06/2021     08/06/2021    CHOL 182 08/06/2021    TRIG 227 (H) 08/06/2021    HDL 52 08/06/2021    ALT 22 07/01/2021    AST 22 07/01/2021     08/05/2021    K 4.1 08/05/2021     (H) 08/05/2021    CREATININE 0.50 08/05/2021    BUN 12 08/05/2021    CO2 23 08/05/2021    TSH 2.86 08/25/2020    INR 0.9 07/14/2019       /63 (Site: Right Upper Arm, Position: Sitting, Cuff Size: Medium Adult)   Pulse 85   Temp 97.5 °F (36.4 °C) (Temporal)   Resp 18   Ht 5' 3\" (1.6 m)   Wt 138 lb 1.6 oz (62.6 kg)   LMP  (LMP Unknown)   BMI 24.46 kg/m²     NEUROLOGICAL EXAMINATION:     MENTAL STATUS: Patient is alert and oriented. There is no confusion or aphasia. Memory is normal.     CRANIAL NERVES: Pupils are equal and reactive. EOMS are equal in all directions. There is no nystagmus or any other abnormal eye movements. Facial sensation is normal.  There is no facial weakness. There is no loss of hearing. Palate and tongue movements are normal.     MOTOR EXAMINATION: Muscle tone is normal in all the limbs. There is no focal weakness. Muscle strength is 5/5 in both upper and lower limbs. There are no abnormal limb movements. SENSORY EXAMINATION: Glove and stocking stocking decrease in sensation in the lower extremities below the mid leg level. Vibration sense is impaired in both the feet. Vibration  sensation is normal.  No sensory loss in upper extremities    STRETCH REFLEXES: 1+ and symmetrical in both the upper and lower limbs, except for diminished left ankle reflex. GAIT: Normal.    IMPRESSION:    1. Pain and paresthesia in both the lower extremities. The etiology is not clear but could be related to underlying degenerative disease of the lumbar spine, given her history of a similar problem in the cervical spine. 2.  Restless leg syndrome  3. Rheumatoid arthritis   4. GERD. 5.  Hyperlipidemia  6. COPD  7. CAD    PLAN:    1. Lumbar spine x-rays to determine if she has underlying degenerative joint disease to explain some of her symptoms in the lower extremities. 2.  Gabapentin 300 mg  twice daily  3. Continue ropinirole at current doses for RLS symptoms  4. Follow-up in 3 weeks for a reevaluation of her symptoms, and response to gabapentin    NOTE: This neurology evaluation is part of outpatient coverage at [facility]  1-2 days per week. Patients requiring frequent evaluations or uncomfortable with potential 3-4 day turnaround on questions or calls  may be better served by a neurologist in the area full time. [facility]. Josefa is available for outpatient visits and procedures including EMG/NCS. Non-[facility] neurologists also practice in [facility] (Dr. Irving Deng) and [facility] (Geena Morgan).        Renato Singh MD   9/20/2021  2:17 PM

## 2021-09-21 ENCOUNTER — HOSPITAL ENCOUNTER (OUTPATIENT)
Dept: MRI IMAGING | Age: 71
Discharge: HOME | End: 2021-09-23
Payer: MEDICARE

## 2021-09-21 DIAGNOSIS — M79.642 LEFT HAND PAIN: ICD-10-CM

## 2021-09-21 PROCEDURE — 73218 MRI UPPER EXTREMITY W/O DYE: CPT

## 2021-09-27 ENCOUNTER — TELEPHONE (OUTPATIENT)
Dept: NEUROLOGY | Age: 71
End: 2021-09-27

## 2021-09-27 NOTE — TELEPHONE ENCOUNTER
Patient called to ask for results of lumbar x-ray. States gabapentin is not helping at all. Does it take longer than a week to work?   Please advise

## 2021-09-30 NOTE — TELEPHONE ENCOUNTER
Patient called back to report after speaking with Dr. Bhavani Martínez on Monday she is not sure if med 3 x daily is helping. Still lots of pain. Will continue 3x daily and call on Monday.

## 2021-10-04 ENCOUNTER — TELEPHONE (OUTPATIENT)
Dept: NEUROLOGY | Age: 71
End: 2021-10-04

## 2021-10-04 DIAGNOSIS — M79.604 PAIN IN BOTH LOWER EXTREMITIES: Primary | ICD-10-CM

## 2021-10-04 DIAGNOSIS — M79.605 PAIN IN BOTH LOWER EXTREMITIES: Primary | ICD-10-CM

## 2021-10-04 RX ORDER — GABAPENTIN 300 MG/1
300 CAPSULE ORAL 3 TIMES DAILY
Qty: 90 CAPSULE | Refills: 2 | Status: SHIPPED | OUTPATIENT
Start: 2021-10-04 | End: 2022-10-26

## 2021-10-04 RX ORDER — TRAMADOL HYDROCHLORIDE 50 MG/1
50 TABLET ORAL 2 TIMES DAILY
Qty: 20 TABLET | Refills: 0 | Status: SHIPPED | OUTPATIENT
Start: 2021-10-04 | End: 2021-10-14

## 2021-10-04 NOTE — TELEPHONE ENCOUNTER
Writer called patient to inform her doctor was sending script for tramodol and refill for gabapentin. Changed appointment   to Oct 18.  Patient states understanding

## 2021-10-06 ENCOUNTER — HOSPITAL ENCOUNTER (OUTPATIENT)
Dept: MRI IMAGING | Age: 71
Discharge: HOME | End: 2021-10-08
Payer: MEDICARE

## 2021-10-06 DIAGNOSIS — M54.2 CERVICAL SPINE PAIN: ICD-10-CM

## 2021-10-06 PROCEDURE — 72141 MRI NECK SPINE W/O DYE: CPT

## 2021-10-18 ENCOUNTER — OFFICE VISIT (OUTPATIENT)
Dept: NEUROLOGY | Age: 71
End: 2021-10-18
Payer: MEDICARE

## 2021-10-18 VITALS
SYSTOLIC BLOOD PRESSURE: 158 MMHG | BODY MASS INDEX: 24.93 KG/M2 | DIASTOLIC BLOOD PRESSURE: 86 MMHG | WEIGHT: 140.7 LBS | RESPIRATION RATE: 18 BRPM | HEIGHT: 63 IN | HEART RATE: 86 BPM | TEMPERATURE: 98 F

## 2021-10-18 DIAGNOSIS — M79.605 PAIN IN BOTH LOWER EXTREMITIES: Primary | ICD-10-CM

## 2021-10-18 DIAGNOSIS — M79.604 PAIN IN BOTH LOWER EXTREMITIES: Primary | ICD-10-CM

## 2021-10-18 PROCEDURE — 1036F TOBACCO NON-USER: CPT | Performed by: NEUROMUSCULOSKELETAL MEDICINE, SPORTS MEDICINE

## 2021-10-18 PROCEDURE — 99213 OFFICE O/P EST LOW 20 MIN: CPT | Performed by: NEUROMUSCULOSKELETAL MEDICINE, SPORTS MEDICINE

## 2021-10-18 PROCEDURE — 3017F COLORECTAL CA SCREEN DOC REV: CPT | Performed by: NEUROMUSCULOSKELETAL MEDICINE, SPORTS MEDICINE

## 2021-10-18 PROCEDURE — 1123F ACP DISCUSS/DSCN MKR DOCD: CPT | Performed by: NEUROMUSCULOSKELETAL MEDICINE, SPORTS MEDICINE

## 2021-10-18 PROCEDURE — G8427 DOCREV CUR MEDS BY ELIG CLIN: HCPCS | Performed by: NEUROMUSCULOSKELETAL MEDICINE, SPORTS MEDICINE

## 2021-10-18 PROCEDURE — 1090F PRES/ABSN URINE INCON ASSESS: CPT | Performed by: NEUROMUSCULOSKELETAL MEDICINE, SPORTS MEDICINE

## 2021-10-18 PROCEDURE — G8420 CALC BMI NORM PARAMETERS: HCPCS | Performed by: NEUROMUSCULOSKELETAL MEDICINE, SPORTS MEDICINE

## 2021-10-18 PROCEDURE — 99214 OFFICE O/P EST MOD 30 MIN: CPT

## 2021-10-18 PROCEDURE — G8399 PT W/DXA RESULTS DOCUMENT: HCPCS | Performed by: NEUROMUSCULOSKELETAL MEDICINE, SPORTS MEDICINE

## 2021-10-18 PROCEDURE — G8484 FLU IMMUNIZE NO ADMIN: HCPCS | Performed by: NEUROMUSCULOSKELETAL MEDICINE, SPORTS MEDICINE

## 2021-10-18 PROCEDURE — 4040F PNEUMOC VAC/ADMIN/RCVD: CPT | Performed by: NEUROMUSCULOSKELETAL MEDICINE, SPORTS MEDICINE

## 2021-10-18 RX ORDER — DULOXETIN HYDROCHLORIDE 30 MG/1
30 CAPSULE, DELAYED RELEASE ORAL DAILY
Qty: 30 CAPSULE | Refills: 1 | Status: SHIPPED | OUTPATIENT
Start: 2021-10-18 | End: 2022-01-13 | Stop reason: ALTCHOICE

## 2021-10-18 NOTE — PROGRESS NOTES
NEUROLOGY Follow up    Patient Name:  Michael Aviles  :     Clinic Visit Date: 10/18/2021    I saw Ms. Michael Aviles  in the neurology clinic today for persistent pain in both lower extremities. No improvement with gabapentin 300 mg 3 times daily. She saw her pain management physician recently who ordered an EMG and nerve conduction of the lower extremities as well as an MRI of the lumbar spine, scheduled for tomorrow. The pain is persistent and constant and \"unbearable \"at times. She 6. Intermittent neck pain and ower back pain. No other significant neurological symptoms of concern at this time. Is concerned that  the medications she is taking for arthritis is causing a peripheral neuropathy. REVIEW OF SYSTEMS    Constitutional Weight changes: absent, change in appetite: absent Fatigue: absent; Fevers : absent, Any recent hospitalizations:  absent   HEENT Ears: normal,  Visual disturbance: absent   Respiratory Shortness of breath: absent, choking:  absent, Cough: absent, Snoring : absent   Cardiovascular Chest pain: absent, Leg swelling :absent, palpitations : absent, fainting : absent   GI Constipation: absent, Diarrhea: absent, Swallowing change: absent    Urinary frequency: absent, Urinary urgency: absent, Urinary incontinence: absent   Musculoskeletal Neck pain: absent, Back pain: absent, Stiffness: absent, Muscle pain: absent, Joint pain: absent, restless leg : absent   Dermatological Hair loss: absent, Skin changes: absent   Neurological Confusion: absent, Trouble concentrating: absent, Seizures: absent;  Memory loss: absent, balance problem: absent, Dizziness: absent, vertigo: absent, Weakness: present, Numbness present, Tremor: absent, Spasm: absent, involuntary movement: absent, Speech difficulty: absent, Headache: absent, Light sensitivity: absent   Psychiatric Anxiety: absent, Depression  absent, drug abuse: absent, Hallucination: absent, mood disorder: absent, Suicidal ideations absent   Hematologic Abnormal bleeding: absent, Anemia: absent, Lymph gland changes: absent Clotting disorder: absent     Past Medical History:   Diagnosis Date    Arthritis     CAD (coronary artery disease) 4/11, 9/12    4 total stents. 2 Stents in RCA, 1 in small OM1.  H/O cardiovascular stress test 5/22/13, 6/25/14    Relatively normal without evidence of significant ischemia or infarction. global LV systolic function was normal w/o regional wall motion abnormalities. No significant EKG evidence of ischemia during monitoring w/o significant associated arrhythmias. Tay score is 5.     H/O cardiovascular stress test 12/28/2017    Larglely normal myocardial perfusion imaging with soft tissue artifact but w/o evidence of significant myocardial ischemia or infarction. results are most consistent with low/intermediate risk for significant CAD    H/O echocardiogram 5/21/13, 6/24/14    EF>55%, mild concentric LVH    History of echocardiogram 11/27/2018    EF of 60%. evidence of moderate diastolic dysfunction is seen.      Hx of cardiac catheterization 08/09/2018    MIld CAD w/o any significant focal stenosis with a normal LV end diastolic pressure LVEDP interestingly the pt developed pretty pronounced ST depression on Lt main engagment with mod chest pressure which resolved o disengagement of the LT main    Hx of heart artery stent 10/06/2011    Lesion of Prox RCA 75% stenosis-Drug Eluting stent to RCA    Hx of percutaneous left heart catheterization 04/07/2011    LMCA, LAD & RAMUS- normal, LCX-lesion on Mid CX 40% stenosis, RCA-Patent stent, EF 60%    Hx of percutaneous left heart catheterization 04/11/2012    Normal renals, Patent RCA stents with mid LCX stenosis IVUS suggest 60-70% stenosis and due to continues ischemia, a LISA was inserted    Hx of percutaneous left heart catheterization 09/27/2012    LMCA & LAD -Normal, LCXMild luminal irregularities, Patent prior stent, RCA-Patent stents in Prox and mid segment    Hyperlipidemia     Normal nuclear stress test 2012       Past Surgical History:   Procedure Laterality Date    CARDIAC CATHETERIZATION Left 2018    right radial/ [facility]/ Dr Marc Smith significant coronary artery disease. Widely patent RCA stent. However, the patient did have pretty severe ST depression on left main engagement with moderate chest pressure that resolved on disengagement   [address]      disc replaced in neck       Social History     Socioeconomic History    Marital status:      Spouse name: Not on file    Number of children: Not on file    Years of education: Not on file    Highest education level: Not on file   Occupational History    Not on file   Tobacco Use    Smoking status: Former Smoker     Packs/day: 2.00     Years: 20.00     Pack years: 40.00     Types: Cigarettes     Quit date: 2000     Years since quittin.4    Smokeless tobacco: Never Used   Vaping Use    Vaping Use: Never used   Substance and Sexual Activity    Alcohol use: Yes     Comment: social    Drug use: No    Sexual activity: Not Currently   Other Topics Concern    Not on file   Social History Narrative    Not on file     Social Determinants of Health     Financial Resource Strain: Low Risk     Difficulty of Paying Living Expenses: Not hard at all   Food Insecurity: No Food Insecurity    Worried About [address] in the Last Year: Never true    [facility] in the Last Year: Never true   Transportation Needs: No Transportation Needs    Lack of Transportation (Medical): No    Lack of Transportation (Non-Medical):  No   Physical Activity: Insufficiently Active    Days of Exercise per Week: 3 days    Minutes of Exercise per Session: 30 min   Stress:     Feeling of Stress :    Social Connections:     Frequency of Communication with Friends and Family:     Frequency of Social Gatherings with Friends and Family:     Attends Religion Services:     Active Member of Clubs or Organizations:     Attends Club or Organization Meetings:     Marital Status:    Intimate Partner Violence:     Fear of Current or Ex-Partner:     Emotionally Abused:     Physically Abused:     Sexually Abused:        Family History   Problem Relation Age of Onset    Cancer Mother         breast cancer    Cancer Brother        Current Outpatient Medications   Medication Sig Dispense Refill    DULoxetine (CYMBALTA) 30 MG extended release capsule Take 1 capsule by mouth daily 30 capsule 1    gabapentin (NEURONTIN) 300 MG capsule Take 1 capsule by mouth 3 times daily for 30 days.  Intended supply: 90 days 90 capsule 2    rOPINIRole (REQUIP) 0.5 MG tablet TAKE 2 TABLETS EVERY  tablet 1    atorvastatin (LIPITOR) 40 MG tablet TAKE 1 TABLET EVERY DAY 90 tablet 1    ranolazine (RANEXA) 1000 MG extended release tablet Take 1 tablet by mouth 2 times daily 180 tablet 3    pantoprazole (PROTONIX) 40 MG tablet Take 1 tablet by mouth daily TAKE 1 TABLET BY MOUTH  DAILY 90 tablet 3    latanoprost (XALATAN) 0.005 % ophthalmic solution Place 1 drop into both eyes nightly       albuterol sulfate HFA (VENTOLIN HFA) 108 (90 Base) MCG/ACT inhaler Inhale 2 puffs into the lungs every 6 hours as needed for Wheezing Dx code: J44.9 1 Inhaler 3    fluticasone-salmeterol (WIXELA INHUB) 250-50 MCG/DOSE AEPB Inhale 1 puff into the lungs every 12 hours 60 each 3    tiotropium (SPIRIVA HANDIHALER) 18 MCG inhalation capsule INHALE THE CONTENTS OF 1  CAPSULE BY MOUTH VIA  HANDIHALER DAILY 90 capsule 3    famciclovir (FAMVIR) 250 MG tablet Take 250 mg by mouth 2 times daily      timolol (BETIMOL) 0.5 % ophthalmic solution Place 1 drop into the right eye 2 times daily      RESTASIS 0.05 % ophthalmic emulsion Place 1 drop into both eyes 2 times daily as needed       aspirin 81 MG tablet Take 81 mg by mouth daily.      pantoprazole (PROTONIX) 20 MG tablet TAKE 1 TABLET EVERY DAY (Patient not taking: Reported on 10/18/2021) 90 tablet 1    PREDNISONE PO Take by mouth daily  (Patient not taking: Reported on 10/18/2021)      leflunomide (ARAVA) 20 MG tablet Take 20 mg by mouth daily (Patient not taking: Reported on 10/18/2021)      nitroGLYCERIN (NITROSTAT) 0.4 MG SL tablet DISSOLVE 1 TABLET UNDER THE TONGUE EVERY 5 MINUTES AS  NEEDED FOR CHEST PAIN. MAX  OF 3 TABLETS IN 15 MINUTES. CALL 911 IF PAIN PERSISTS. (Patient not taking: Reported on 10/18/2021) 75 tablet 4     No current facility-administered medications for this visit. DATA:  Lab Results   Component Value Date    WBC 4.6 08/06/2021    HGB 12.2 08/06/2021     08/06/2021    CHOL 182 08/06/2021    TRIG 227 (H) 08/06/2021    HDL 52 08/06/2021    ALT 22 07/01/2021    AST 22 07/01/2021     08/05/2021    K 4.1 08/05/2021     (H) 08/05/2021    CREATININE 0.50 08/05/2021    BUN 12 08/05/2021    CO2 23 08/05/2021    TSH 2.86 08/25/2020    INR 0.9 07/14/2019       BP (!) 158/86 (Site: Right Upper Arm, Position: Sitting, Cuff Size: Medium Adult)   Pulse 86   Temp 98 °F (36.7 °C) (Temporal)   Resp 18   Ht 5' 3\" (1.6 m)   Wt 140 lb 11.2 oz (63.8 kg)   LMP  (LMP Unknown)   BMI 24.92 kg/m²     NEUROLOGICAL EXAMINATION:     MENTAL STATUS: Patient is alert and oriented. There is no confusion or aphasia. Memory is normal.     CRANIAL NERVES:III-XII are normal.    MOTOR EXAMINATION: Muscle tone is normal in all the limbs. There is no focal extremity weakness. Muscle strength is 5/5 in both upper and lower limbs. There are no abnormal limb movements. SENSORY EXAMINATION: Normal.     STRETCH REFLEXES: 1+ and symmetrical in both the upper and lower limbs. GAIT: Normal      IMPRESSION: Chronic pain in both lower extremities. Tremor radiculopathy or neuropathy. Work-up is in progress.   MRI of the lumbar spine and EMG/nerve conduction studies    PLAN:    1. Continue gabapentin 300 mg 3 times daily. 2.  Start Cymbalta 30 mg daily at bedtime   3. Follow-up in 1 month    NOTE: This neurology evaluation is part of outpatient coverage at [facility]  1-2 days per week. Patients requiring frequent evaluations or uncomfortable with potential 3-4 day turnaround on questions or calls  may be better served by a neurologist in the area full time. [facility]. Josefa is available for outpatient visits and procedures including EMG/NCS. Non-[facility] neurologists also practice in [facility] (Dr. Clara Guardado) and [facility] (Geena Austin).        Rush Key MD   10/18/2021  5:44 PM

## 2021-10-18 NOTE — PATIENT INSTRUCTIONS
SURVEY:    You may be receiving a survey from [employer] regarding your visit today. Please complete the survey to enable us to provide the highest quality of care to you and your family. If you cannot score us a very good on any question, please call the office to discuss how we could have made your experience a very good one. Thank you.

## 2021-10-19 ENCOUNTER — HOSPITAL ENCOUNTER (OUTPATIENT)
Dept: MRI IMAGING | Age: 71
Discharge: HOME | End: 2021-10-21
Payer: MEDICARE

## 2021-10-19 DIAGNOSIS — M51.36 DISC DEGENERATION, LUMBAR: ICD-10-CM

## 2021-10-19 PROCEDURE — 72148 MRI LUMBAR SPINE W/O DYE: CPT

## 2021-10-25 ENCOUNTER — TELEPHONE (OUTPATIENT)
Dept: NEUROLOGY | Age: 71
End: 2021-10-25

## 2021-10-25 NOTE — TELEPHONE ENCOUNTER
Patient called to state pain is no better and after nerve tests doctor suggested she call Dr. Noa Watt and increase gabapentin dosage. Writer called patient back to inform her doctor wont be here for 2 weeks. Suggested she call PCP if doctor was needed before.   States understanding

## 2021-10-25 NOTE — TELEPHONE ENCOUNTER
We received a refill request from . [facility] order for Ventolin. Please advise.

## 2021-10-29 ENCOUNTER — TELEPHONE (OUTPATIENT)
Dept: CARDIOLOGY | Age: 71
End: 2021-10-29

## 2021-10-29 NOTE — TELEPHONE ENCOUNTER
Pt is having a hard time taking her Ranexa 1000 mg tablet due to it being too big for her to swallow. She has tried to cut them in half and even quarters but still can not swallow them so she finds herself not even taking them. She would like to know if she can go back on 500 mg where the pill is smaller and easier to swallow? br

## 2021-11-11 ENCOUNTER — TELEPHONE (OUTPATIENT)
Dept: CARDIOLOGY | Age: 71
End: 2021-11-11

## 2021-11-11 RX ORDER — RANOLAZINE 500 MG/1
1000 TABLET, EXTENDED RELEASE ORAL 2 TIMES DAILY
Qty: 180 TABLET | Refills: 3 | Status: SHIPPED | OUTPATIENT
Start: 2021-11-11 | End: 2022-03-17

## 2021-11-17 ENCOUNTER — HOSPITAL ENCOUNTER (OUTPATIENT)
Age: 71
Discharge: HOME | End: 2021-11-17
Payer: MEDICARE

## 2021-11-17 LAB
ABSOLUTE EOS #: 0.36 K/UL (ref 0–0.44)
ABSOLUTE IMMATURE GRANULOCYTE: <0.03 K/UL (ref 0–0.3)
ABSOLUTE LYMPH #: 1.73 K/UL (ref 1.1–3.7)
ABSOLUTE MONO #: 0.73 K/UL (ref 0.1–1.2)
ALT SERPL-CCNC: 20 U/L (ref 5–33)
AST SERPL-CCNC: 20 U/L
BASOPHILS # BLD: 1 % (ref 0–2)
BASOPHILS ABSOLUTE: 0.05 K/UL (ref 0–0.2)
CREAT SERPL-MCNC: 0.5 MG/DL (ref 0.5–0.9)
DIFFERENTIAL TYPE: ABNORMAL
EOSINOPHILS RELATIVE PERCENT: 5 % (ref 1–4)
GFR AFRICAN AMERICAN: >60 ML/MIN
GFR NON-AFRICAN AMERICAN: >60 ML/MIN
GFR SERPL CREATININE-BSD FRML MDRD: NORMAL ML/MIN/{1.73_M2}
GFR SERPL CREATININE-BSD FRML MDRD: NORMAL ML/MIN/{1.73_M2}
HCT VFR BLD CALC: 39.7 % (ref 36.3–47.1)
HEMOGLOBIN: 12.7 G/DL (ref 11.9–15.1)
IMMATURE GRANULOCYTES: 0 %
LYMPHOCYTES # BLD: 26 % (ref 24–43)
MCH RBC QN AUTO: 32.2 PG (ref 25.2–33.5)
MCHC RBC AUTO-ENTMCNC: 32 G/DL (ref 28.4–34.8)
MCV RBC AUTO: 100.5 FL (ref 82.6–102.9)
MONOCYTES # BLD: 11 % (ref 3–12)
NRBC AUTOMATED: 0 PER 100 WBC
PDW BLD-RTO: 12.3 % (ref 11.8–14.4)
PLATELET # BLD: 254 K/UL (ref 138–453)
PLATELET ESTIMATE: ABNORMAL
PMV BLD AUTO: 9.3 FL (ref 8.1–13.5)
RBC # BLD: 3.95 M/UL (ref 3.95–5.11)
RBC # BLD: ABNORMAL 10*6/UL
SEG NEUTROPHILS: 57 % (ref 36–65)
SEGMENTED NEUTROPHILS ABSOLUTE COUNT: 3.88 K/UL (ref 1.5–8.1)
WBC # BLD: 6.8 K/UL (ref 3.5–11.3)
WBC # BLD: ABNORMAL 10*3/UL

## 2021-11-17 PROCEDURE — 36415 COLL VENOUS BLD VENIPUNCTURE: CPT

## 2021-11-17 PROCEDURE — 84450 TRANSFERASE (AST) (SGOT): CPT

## 2021-11-17 PROCEDURE — 84460 ALANINE AMINO (ALT) (SGPT): CPT

## 2021-11-17 PROCEDURE — 82565 ASSAY OF CREATININE: CPT

## 2021-11-17 PROCEDURE — 85025 COMPLETE CBC W/AUTO DIFF WBC: CPT

## 2021-12-15 PROBLEM — S81.811A NONINFECTED SKIN TEAR OF RIGHT LEG: Status: ACTIVE | Noted: 2021-12-15

## 2022-01-14 ENCOUNTER — HOSPITAL ENCOUNTER (OUTPATIENT)
Age: 72
Discharge: HOME | End: 2022-01-14
Payer: MEDICARE

## 2022-01-14 DIAGNOSIS — E78.5 HYPERLIPIDEMIA WITH TARGET LDL LESS THAN 70: ICD-10-CM

## 2022-01-14 DIAGNOSIS — R79.9 ABNORMAL FINDING OF BLOOD CHEMISTRY, UNSPECIFIED: ICD-10-CM

## 2022-01-14 LAB
ESTIMATED AVERAGE GLUCOSE: 108 MG/DL
HBA1C MFR BLD: 5.4 % (ref 4–6)
HCT VFR BLD CALC: 39.3 % (ref 36.3–47.1)
HEMOGLOBIN: 12.2 G/DL (ref 11.9–15.1)
MCH RBC QN AUTO: 31.8 PG (ref 25.2–33.5)
MCHC RBC AUTO-ENTMCNC: 31 G/DL (ref 28.4–34.8)
MCV RBC AUTO: 102.3 FL (ref 82.6–102.9)
NRBC AUTOMATED: 0 PER 100 WBC
PDW BLD-RTO: 13.5 % (ref 11.8–14.4)
PLATELET # BLD: 227 K/UL (ref 138–453)
PMV BLD AUTO: 9.4 FL (ref 8.1–13.5)
RBC # BLD: 3.84 M/UL (ref 3.95–5.11)
WBC # BLD: 3.6 K/UL (ref 3.5–11.3)

## 2022-01-14 PROCEDURE — 36415 COLL VENOUS BLD VENIPUNCTURE: CPT

## 2022-01-14 PROCEDURE — 85027 COMPLETE CBC AUTOMATED: CPT

## 2022-01-14 PROCEDURE — 83036 HEMOGLOBIN GLYCOSYLATED A1C: CPT

## 2022-03-17 RX ORDER — RANOLAZINE 500 MG/1
TABLET, EXTENDED RELEASE ORAL
Qty: 360 TABLET | Refills: 3 | Status: SHIPPED | OUTPATIENT
Start: 2022-03-17

## 2022-04-07 NOTE — PROGRESS NOTES
Subjective:      Patient ID: Eliana Vasquez is a 70 y.o. female. HPI  Patient here for follow-up for COPD and history of COVID in November 2020. Patient was last seen in the office in April 2021 for me and in November 2020 for Dr. Karlee Mcallister    Patient notes that weather can be a factor in her breathing comfort. Notes shortness of breath with humidity. Can be associated with a cough as well. Patient is currently on Wixela and Spiriva HandiHaler. Spiriva HandiHaler is expensive, patient looking for alternative inhaler that would be less expensive. Discussed Trelegy Ellipta and patient is interested in [address] however has a backlog of Wixela inhalers that she would like to use up first.  New order for Spiriva Respimat was sent to pharmacy today. Patient instructed to speak with her pharmacist to see what would be the cheapest alternative in that drug class. It appears that [employer] may also be covered however it does not give a cost to patient on my end. Patient is 22 years smoking cessation. Medications:   Wixela 250-50 mcg: Albuterol HFA:  Spiriva HandiHaler:        PRIOR WORKUP:  PFT:  PFT 6/23/2020: Spirometry with a restrictive flow volume loop. FEV1 47% of predicted with a 21% bronchodilator change. FVC 49% of predicted with a 22% bronchodilator change. FEV1/FVC ratio 72. Lung volumes by body box show total lung capacity 148% of predicted, % of predicted consistent with air trapping. Diffusion capacity when corrected for alveolar volume is 76% of predicted airway resistance is increased. Final impression: Combined obstructive and restrictive dysfunction. Moderate in severity with a significant bronchodilator response.     CT Imaging:  CTA chest 8/6/2021: Negative for pulmonary embolism. No evidence of mediastinal adenopathy. Few scattered coronary artery calcifications. Lungs are without acute process. Mild hyperexpansion predominating upper lungs typical of COPD.   No focal consolidation or pulmonary edema. No pleural effusion or pneumothorax. CT chest 10/29/2018: Negative for focal lung consolidation, pneumothorax or pleural effusion. No suspicious noncalcified lung nodule or mass.     CT chest 3/24/2015: Negative for focal consolidation or pleural effusion. Few small scattered parenchymal opacities throughout both lungs which may be infectious or inflammatory in nature. No bronchiectasis or significant interstitial thickening. Mild underlying centrilobular emphysema with no mediastinal or hilar lymphadenopathy.     Sleep Study:  Apnea link 6/28/2018: AHI of 2.7. Lowest desaturation was 90%.    Laboratory Evaluation:  Alpha 1 antitrypsin with phenotype 7/30/2020: 125, M2 M2       Immunizations:   Immunization History   Administered Date(s) Administered    COVID-19, Pfizer Purple top, DILUTE for use, 12+ yrs, 30mcg/0.3mL dose 07/21/2021, 08/11/2021    Pneumococcal Conjugate 13-valent ([id number]) 06/26/2019    Pneumococcal Polysaccharide ([id number]) 07/27/2020    Tdap (Boostrix, Adacel) 10/29/2020        No flowsheet data found. /72 (Site: Left Upper Arm, Position: Sitting, Cuff Size: Medium Adult)   Pulse 75   Temp 97.8 °F (36.6 °C) (Temporal)   Resp 18   Ht 5' 3\" (1.6 m)   Wt 135 lb 4.8 oz (61.4 kg)   LMP  (LMP Unknown)   SpO2 98%   BMI 23.97 kg/m²     Past Medical History:   Diagnosis Date    Arthritis     CAD (coronary artery disease) 4/11, 9/12    4 total stents. 2 Stents in RCA, 1 in small OM1.  H/O cardiovascular stress test 5/22/13, 6/25/14    Relatively normal without evidence of significant ischemia or infarction. global LV systolic function was normal w/o regional wall motion abnormalities. No significant EKG evidence of ischemia during monitoring w/o significant associated arrhythmias.  Tay score is 5.     H/O cardiovascular stress test 12/28/2017    Larglely normal myocardial perfusion imaging with soft tissue artifact but w/o evidence of significant myocardial ischemia or infarction. results are most consistent with low/intermediate risk for significant CAD    H/O echocardiogram 5/21/13, 6/24/14    EF>55%, mild concentric LVH    History of echocardiogram 11/27/2018    EF of 60%. evidence of moderate diastolic dysfunction is seen.  Hx of cardiac catheterization 08/09/2018    MIld CAD w/o any significant focal stenosis with a normal LV end diastolic pressure LVEDP interestingly the pt developed pretty pronounced ST depression on Lt main engagment with mod chest pressure which resolved o disengagement of the LT main    Hx of heart artery stent 10/06/2011    Lesion of Prox RCA 75% stenosis-Drug Eluting stent to RCA    Hx of percutaneous left heart catheterization 04/07/2011    LMCA, LAD & RAMUS- normal, LCX-lesion on Mid CX 40% stenosis, RCA-Patent stent, EF 60%    Hx of percutaneous left heart catheterization 04/11/2012    Normal renals, Patent RCA stents with mid LCX stenosis IVUS suggest 60-70% stenosis and due to continues ischemia, a LISA was inserted    Hx of percutaneous left heart catheterization 09/27/2012    LMCA & LAD -Normal, LCXMild luminal irregularities, Patent prior stent, RCA-Patent stents in Prox and mid segment    Hyperlipidemia     Idiopathic peripheral neuropathy     [place] [facility] and [facility]. Batista [facility] for neurology    Normal nuclear stress test 06/06/2012    Rheumatoid arthritis involving multiple sites with positive rheumatoid factor ([address]     Dr. Jerson Umana [facility]     Past Surgical History:   Procedure Laterality Date    CARDIAC CATHETERIZATION Left 08/09/2018    right radial/ [facility]/ Dr Elvis Funes significant coronary artery disease. Widely patent RCA stent.  However, the patient did have pretty severe ST depression on left main engagement with moderate chest pressure that resolved on disengagement   [address] disc replaced in neck     Family History   Problem Relation Age of Onset    Cancer Mother         breast cancer    Cancer Brother        Social History     Socioeconomic History    Marital status:      Spouse name: Not on file    Number of children: Not on file    Years of education: Not on file    Highest education level: Not on file   Occupational History    Not on file   Tobacco Use    Smoking status: Former Smoker     Packs/day: 2.00     Years: 20.00     Pack years: 40.00     Types: Cigarettes     Quit date: 2000     Years since quittin.9    Smokeless tobacco: Never Used   Vaping Use    Vaping Use: Never used   Substance and Sexual Activity    Alcohol use: Yes     Comment: social    Drug use: No    Sexual activity: Not Currently   Other Topics Concern    Not on file   Social History Narrative    Not on file     Social Determinants of Health     Financial Resource Strain: Low Risk     Difficulty of Paying Living Expenses: Not hard at all   Food Insecurity: No Food Insecurity    Worried About [address] in the Last Year: Never true    [facility] in the Last Year: Never true   Transportation Needs: No Transportation Needs    Lack of Transportation (Medical): No    Lack of Transportation (Non-Medical): No   Physical Activity: Insufficiently Active    Days of Exercise per Week: 3 days    Minutes of Exercise per Session: 30 min   Stress: No Stress Concern Present    Feeling of Stress : Not at all   Social Connections:  Moderately Integrated    Frequency of Communication with Friends and Family: Twice a week    Frequency of Social Gatherings with Friends and Family: Once a week    Attends Voodoo Services: 1 to 4 times per year    Active Member of [address] [place] [address] or Organizations: No    Attends Club or Organization Meetings: Never    Marital Status:    Intimate Partner Violence: Not At Risk    Fear of Current or Ex-Partner: No    Emotionally Abused: No    Physically Abused: No    Sexually Abused: No   Housing Stability:     Unable to Pay for Housing in the Last Year: Not on file    Number of Places Lived in the Last Year: Not on file    Unstable Housing in the Last Year: Not on file       Review of Systems   Constitutional: Negative for fatigue and fever. HENT: Negative for postnasal drip, rhinorrhea, sinus pressure and sinus pain. Respiratory: Negative for cough, chest tightness, shortness of breath, wheezing and stridor. Cardiovascular: Negative for chest pain and leg swelling. Gastrointestinal: Negative for constipation, diarrhea, nausea and vomiting. Genitourinary: Negative for dysuria, frequency and urgency. Musculoskeletal: Negative for arthralgias, joint swelling and myalgias. Skin: Negative for rash. Neurological: Negative for dizziness, speech difficulty and headaches. Hematological: Does not bruise/bleed easily. Psychiatric/Behavioral: Negative for agitation, hallucinations, sleep disturbance and suicidal ideas.        Objective:       Physical Exam  General appearance - alert, well appearing, and in no distress, oriented to person, place, and time and normal appearing weight  Mental status - alert, oriented to person, place, and time  Eyes - pupils equal and reactive, extraocular eye movements intact  Ears - not examined  Nose - normal and patent, no erythema, discharge or polyps  Mouth - mucous membranes moist, pharynx normal without lesions  Neck - supple, no significant adenopathy  Chest - clear to auscultation, no wheezes, rales or rhonchi, symmetric air entry  Heart -normal rate, regular rhythm, normal S1, S2, no murmurs, rubs, clicks or gallops  Abdomen - soft, nontender, nondistended, no masses or organomegaly  Neuro- alert, oriented, normal speech, no focal findings or movement disorder noted}  Extremities - peripheral pulses normal, no pedal edema, no clubbing or cyanosis  Skin - normal coloration and turgor, no rashes, no suspicious skin lesions noted     Wt Readings from Last 3 Encounters:   04/25/22 135 lb 4.8 oz (61.4 kg)   04/13/22 138 lb 6.4 oz (62.8 kg)   01/13/22 136 lb (61.7 kg)       Results for orders placed or performed during the hospital encounter of 01/14/22   CBC   Result Value Ref Range    WBC 3.6 3.5 - 11.3 k/uL    RBC 3.84 (L) 3.95 - 5.11 m/uL    Hemoglobin 12.2 11.9 - 15.1 g/dL    Hematocrit 39.3 36.3 - 47.1 %    .3 82.6 - 102.9 fL    MCH 31.8 25.2 - 33.5 pg    MCHC 31.0 28.4 - 34.8 g/dL    RDW 13.5 11.8 - 14.4 %    Platelets 090 664 - 087 k/uL    MPV 9.4 8.1 - 13.5 fL    NRBC Automated 0.0 0.0 per 100 WBC   Hemoglobin A1C   Result Value Ref Range    Hemoglobin A1C 5.4 4.0 - 6.0 %    Estimated Avg Glucose 108 mg/dL       No results found. Assessment:      1. History of COVID-19    2. Former heavy tobacco smoker    3. Stage 2 moderate COPD by GOLD classification ([address]          Plan:      1. Medications reviewed, continue as ordered. Spiriva Respimat was sent in rather than Spiriva HandiHaler due to cost to patient. Patient is also interested in [address] [place] [address] however would like to use up her [employer] first  2. Educated and clarified the medication use. 3. Recommend flu vaccination in the fall annually. 4. Patient is up-to-date with pneumococcal vaccinations from pulmonary perspective. 5. Patient has received COVID-vaccine and booster. Discussed strategies to protect against Covid 19.   6. Maintain an active lifestyle. 7. Patient's questions were answered to her satisfaction. 8. Pulmonary function tests were reviewed  9. CT scan of the chest was reviewed  10.  We'll see the patient back in 6 months          Electronically signed by ESTELLE Addison CNP on 4/25/2022 at 1:19 PM

## 2022-04-13 ENCOUNTER — OFFICE VISIT (OUTPATIENT)
Dept: CARDIOLOGY | Age: 72
End: 2022-04-13
Payer: MEDICARE

## 2022-04-13 VITALS
BODY MASS INDEX: 24.52 KG/M2 | WEIGHT: 138.4 LBS | HEART RATE: 80 BPM | OXYGEN SATURATION: 95 % | SYSTOLIC BLOOD PRESSURE: 93 MMHG | HEIGHT: 63 IN | RESPIRATION RATE: 18 BRPM | DIASTOLIC BLOOD PRESSURE: 61 MMHG

## 2022-04-13 DIAGNOSIS — I35.1 MODERATE AORTIC REGURGITATION: ICD-10-CM

## 2022-04-13 DIAGNOSIS — E78.2 MIXED HYPERLIPIDEMIA: ICD-10-CM

## 2022-04-13 DIAGNOSIS — I25.10 ASHD (ARTERIOSCLEROTIC HEART DISEASE): Primary | ICD-10-CM

## 2022-04-13 DIAGNOSIS — I10 ESSENTIAL HYPERTENSION: ICD-10-CM

## 2022-04-13 DIAGNOSIS — I20.1 CORONARY VASOSPASM (HCC): ICD-10-CM

## 2022-04-13 PROCEDURE — 1036F TOBACCO NON-USER: CPT | Performed by: FAMILY MEDICINE

## 2022-04-13 PROCEDURE — G8399 PT W/DXA RESULTS DOCUMENT: HCPCS | Performed by: FAMILY MEDICINE

## 2022-04-13 PROCEDURE — 99213 OFFICE O/P EST LOW 20 MIN: CPT | Performed by: FAMILY MEDICINE

## 2022-04-13 PROCEDURE — 1090F PRES/ABSN URINE INCON ASSESS: CPT | Performed by: FAMILY MEDICINE

## 2022-04-13 PROCEDURE — 4040F PNEUMOC VAC/ADMIN/RCVD: CPT | Performed by: FAMILY MEDICINE

## 2022-04-13 PROCEDURE — 1123F ACP DISCUSS/DSCN MKR DOCD: CPT | Performed by: FAMILY MEDICINE

## 2022-04-13 PROCEDURE — G8427 DOCREV CUR MEDS BY ELIG CLIN: HCPCS | Performed by: FAMILY MEDICINE

## 2022-04-13 PROCEDURE — 99211 OFF/OP EST MAY X REQ PHY/QHP: CPT | Performed by: FAMILY MEDICINE

## 2022-04-13 PROCEDURE — G8420 CALC BMI NORM PARAMETERS: HCPCS | Performed by: FAMILY MEDICINE

## 2022-04-13 PROCEDURE — 3017F COLORECTAL CA SCREEN DOC REV: CPT | Performed by: FAMILY MEDICINE

## 2022-04-13 RX ORDER — NITROGLYCERIN 0.4 MG/1
TABLET SUBLINGUAL
Qty: 75 TABLET | Refills: 4 | Status: SHIPPED | OUTPATIENT
Start: 2022-04-13

## 2022-04-13 NOTE — PATIENT INSTRUCTIONS
SURVEY:    You may be receiving a survey from [employer] regarding your visit today. Please complete the survey to enable us to provide the highest quality of care to you and your family. If you cannot score us a very good on any question, please call the office to discuss how we could have made your experience a very good one. Thank you.

## 2022-04-13 NOTE — PROGRESS NOTES
Khadijah Quintanilla am scribing for and in the presence of Vani Mark. Bud BECKMAN, MS, F.A.C.C..    Patient: Venice Vasques  :   Date of Visit: 2022    REASON FOR VISIT / CONSULTATION: Follow up for CAD, Angina    Dear Florinda Soulier, APRN - CNP,    I had the pleasure of seeing your patient Venice Vasques in followup today. As you know, Ms. Marquez is a 70 y.o. female with a history of coronary artery disease with a total of 4 stents, most recently in 2012. In 10/12 she had a repeat heart catheterization with a bit of a complication including severe chest pain after a left ventriculogram but this resolved. Ms. Chuy Castellanos underwent a repeat stress test in  which was abnormal and on her heart catheterization done 2018 she developed chest pressure as well as ST changes on left main engagement suspicious for coronary vasospasm. Her echocardiogram done 2018 shows moderate aortic regurgitation. Echo done on 2021 showed ejection fraction of >55%. The left ventricular cavity size is within normal limits and the left ventricular wall thickness is mildly increased. No definite specific wall motion abnormalities were identified. Aortic valve is sclerotic but opens well. Mild to moderate aortic regurgitation. Evidence of mild (grade I) diastolic dysfunction is seen. An anterior echo free space is seen suggestive of a small effusion or fat Pad. Stress test done on 2021 was largely abnormal, most consistent with low risk for ischemia. Ms. Chuy Castellanos had recent heart cath on 2022 at [facility] EF 55-60% No significant CAD. Patent right coronary artery and cx stents. Ms. Chuy Castellanos is here for a six month follow up and hospital follow up. She was in the hospital due to chest pain on 2022 at [facility] which led to having heart cath done on 2022. She was giving Nitro in hospital that did help symptoms. She has not had any chest pain since that episode.  She said while a [employer] game she started to see black spots and her daughter took her to ER, on the way she started having chest tightness. She had elevated troponin and blood pressure when she arrived. She does have some lightheaded/dizziness, She denied any current chest pain, abdominal pain, bleeding problems, problems with her medications or any other concerns at this time. She denies any palpitations. Past Medical History:   Diagnosis Date    Arthritis     CAD (coronary artery disease) 4/11, 9/12    4 total stents. 2 Stents in RCA, 1 in small OM1.  H/O cardiovascular stress test 5/22/13, 6/25/14    Relatively normal without evidence of significant ischemia or infarction. global LV systolic function was normal w/o regional wall motion abnormalities. No significant EKG evidence of ischemia during monitoring w/o significant associated arrhythmias. Tay score is 5.     H/O cardiovascular stress test 12/28/2017    Larglely normal myocardial perfusion imaging with soft tissue artifact but w/o evidence of significant myocardial ischemia or infarction. results are most consistent with low/intermediate risk for significant CAD    H/O echocardiogram 5/21/13, 6/24/14    EF>55%, mild concentric LVH    History of echocardiogram 11/27/2018    EF of 60%. evidence of moderate diastolic dysfunction is seen.      Hx of cardiac catheterization 08/09/2018    MIld CAD w/o any significant focal stenosis with a normal LV end diastolic pressure LVEDP interestingly the pt developed pretty pronounced ST depression on Lt main engagment with mod chest pressure which resolved o disengagement of the LT main    Hx of heart artery stent 10/06/2011    Lesion of Prox RCA 75% stenosis-Drug Eluting stent to RCA    Hx of percutaneous left heart catheterization 04/07/2011    LMCA, LAD & RAMUS- normal, LCX-lesion on Mid CX 40% stenosis, RCA-Patent stent, EF 60%    Hx of percutaneous left heart catheterization 04/11/2012    Normal renals, Patent RCA stents with mid LCX stenosis IVUS suggest 60-70% stenosis and due to continues ischemia, a LISA was inserted    Hx of percutaneous left heart catheterization 2012    LMCA & LAD -Normal, LCXMild luminal irregularities, Patent prior stent, RCA-Patent stents in Prox and mid segment    Hyperlipidemia     Idiopathic peripheral neuropathy     [place] [facility] and Dr. Radha Del Rio for neurology    Normal nuclear stress test 2012    Rheumatoid arthritis involving multiple sites with positive rheumatoid factor ([facility]     Dr. Татьяна Chakraborty: Benadryl [diphenhydramine], Iv dye [iodides], and Pcn [penicillins] REVIEW OF SYSTEMS: 14 systems were reviewed. Pertinent positives and negatives as above, all else negative. Past Surgical History:   Procedure Laterality Date    [facility]    [facility] Van Roberts significant coronary artery disease. Widely patent RCA stent.  However, the patient did have pretty severe ST depression on left main engagement with moderate chest pressure that resolved on disengagement    CARPAL [address]      disc replaced in neck    Social History:  Social History     Tobacco Use    Smoking status: Former Smoker     Packs/day: 2.00     Years: 20.00     Pack years: 40.00     Types: Cigarettes     Quit date: 2000     Years since quittin.9    Smokeless tobacco: Never Used   Vaping Use    Vaping Use: Never used   Substance Use Topics    Alcohol use: Yes     Comment: social    Drug use: No        CURRENT MEDICATIONS:  Outpatient Medications Marked as Taking for the 22 encounter (Office Visit) with Precious Kay MD   Medication Sig Dispense Refill    adalimumab (HUMIRA) 40 MG/0.8ML injection 40 mg      Fluticasone Furoate-Vilanterol (BREO ELLIPTA IN) Inhale into the lungs      ranolazine (RANEXA) 500 MG extended release tablet TAKE 2 TABLETS TWICE DAILY (Patient taking differently: 1,000 mg ) 360 tablet 3    rOPINIRole (REQUIP) 0.5 MG tablet TAKE 2 TABLETS EVERY DAY (Patient taking differently: 0.5 mg TAKE 2 TABLETS EVERY DAY) 180 tablet 1    atorvastatin (LIPITOR) 40 MG tablet TAKE 1 TABLET EVERY DAY 90 tablet 1    methotrexate (RHEUMATREX) 2.5 MG chemo tablet       VENTOLIN  (90 Base) MCG/ACT inhaler INHALE 2 PUFFS INTO THE LUNGS EVERY 6 HOURS AS NEEDED FOR WHEEZING 18 g 11    pantoprazole (PROTONIX) 40 MG tablet Take 1 tablet by mouth daily TAKE 1 TABLET BY MOUTH  DAILY 90 tablet 3    latanoprost (XALATAN) 0.005 % ophthalmic solution Place 1 drop into both eyes nightly       tiotropium (SPIRIVA HANDIHALER) 18 MCG inhalation capsule INHALE THE CONTENTS OF 1  CAPSULE BY MOUTH VIA  HANDIHALER DAILY 90 capsule 3    nitroGLYCERIN (NITROSTAT) 0.4 MG SL tablet DISSOLVE 1 TABLET UNDER THE TONGUE EVERY 5 MINUTES AS  NEEDED FOR CHEST PAIN. MAX  OF 3 TABLETS IN 15 MINUTES. CALL 911 IF PAIN PERSISTS. 75 tablet 4    famciclovir (FAMVIR) 250 MG tablet Take 250 mg by mouth 2 times daily      timolol (BETIMOL) 0.5 % ophthalmic solution Place 1 drop into the right eye 2 times daily      RESTASIS 0.05 % ophthalmic emulsion Place 1 drop into both eyes 2 times daily as needed       aspirin 81 MG tablet Take 81 mg by mouth daily. FAMILY HISTORY: family history includes Cancer in her brother and mother. PHYSICAL EXAM:   BP 93/61 (Site: Left Upper Arm, Position: Sitting, Cuff Size: Medium Adult)   Pulse 80   Resp 18   Ht 5' 3\" (1.6 m)   Wt 138 lb 6.4 oz (62.8 kg)   LMP  (LMP Unknown)   SpO2 95%   BMI 24.52 kg/m²  Body mass index is 24.52 kg/m². Constitutional: She is oriented to person, place, and time. She appears well-developed and well-nourished. In no acute distress. HEENT: Normocephalic and atraumatic. No JVD present. Carotid bruit is not present. No mass and no thyromegaly present.  No lymphadenopathy present. Cardiovascular: Normal rate, regular rhythm, normal heart sounds. Exam reveals no gallop and no friction rubs. 2/6 systolic murmur, 5th intercostal space on the LEFT in the mid-clavicular line (cardiac apex). Pulmonary/Chest: Effort normal and breath sounds normal. No respiratory distress. She has no wheezes, rhonchi or rales. Abdominal: Soft, non-tender. Bowel sounds and aorta are normal. She exhibits no organomegaly, mass or bruit. Extremities: No edema. No cyanosis and no clubbing. Pulses are 2+ radial and carotid pulses. 2+ dorsalis pedis and posterior tibial pulses bilaterally. Neurological: She is alert and oriented to person, place, and time. No evidence of gross cranial nerve deficit. Coordination appeared normal.   Skin: Skin is warm and dry. There is no rash or diaphoresis. Psychiatric: She has a normal mood and affect. Her speech is normal and behavior is normal.      MOST RECENT LABS ON RECORD:   Lab Results   Component Value Date    WBC 3.6 01/14/2022    HGB 12.2 01/14/2022    HCT 39.3 01/14/2022     01/14/2022    CHOL 182 08/06/2021    TRIG 227 (H) 08/06/2021    HDL 52 08/06/2021    LDLCHOLESTEROL 85 08/06/2021    ALT 20 11/17/2021    AST 20 11/17/2021     08/05/2021    K 4.1 08/05/2021     (H) 08/05/2021    CREATININE 0.50 11/17/2021    BUN 12 08/05/2021    CO2 23 08/05/2021    TSH 2.86 08/25/2020    INR 0.9 07/14/2019    LABA1C 5.4 01/14/2022        ASSESSMENT:  Encounter Diagnoses   Name Primary?  ASHD (arteriosclerotic heart disease) Yes    Coronary vasospasm (HCC)     Essential hypertension     Mixed hyperlipidemia     Moderate aortic regurgitation       PLAN:  · ASHD:  with most likely history of coronary vasospasm: largely normal stress test on 8/5/2021, also has a long history of GERD and symptoms could be consistent with esophageal spasm due to esophagitis. Normal heart cath 4/12/2022.   · Anti-anginal medications: Continue ranolazine (Ranexa) 1000 mg twice daily. · Anti-anginal medications: Continue nitroglycerin 0.4 mg tablets as needed for chest pain. · Atherosclerotic Heart Disease: Hx: Stents in 2012  Antiplatelet Agent: Continue Aspirin 81 mg daily. Beta Blocker: Unable to tolerate   Calcium Channel Blocker: Not indicated at this time. · Statin Therapy: Continue atorvastatin (Lipitor) 40 mg nightly. · Coronary Vasospasm: Evidence on Heart Catheterization 8/2018. largely normal stress test on 8/5/2021  · Calcium Channel Blocker: Not indicated  · Anti-anginal medications: Continue ranolazine (Ranexa) 1000 mg twice daily. Essential Hypertension: Controlled   · Beta Blocker: Unable to tolerate   · ACE Inibitor/ARB: Not indicated at this time. · Diuretics: Not indicated at this time. · Calcium Channel Blocker: Not indicated at this time. · Additional Testing List: None     · Hyperlipidemia: Mixed LDL: 85 on 8/6/2021  · Statin Therapy: Continue atorvastatin (Lipitor) 40 mg nightly. · Moderate aortic regurgitation shown on echocardiogram done on 8/5/2021    In the meantime, I encouraged her to continue all of her current medications and follow up with you as previously scheduled. FOLLOW UP:   I told Ms. Marquez to call my office if she had any problems, but otherwise told her to Return in about 6 months (around 10/13/2022). However, I would be happy to see her sooner should the need arise. Once again, thank you for allowing me to participate in this patients care. Please do not hesitate to contact me could I be of further assistance. Sincerely,  Tyler Farrell MD, MS, F.A.C.C. [address] [place] [address] Cardiology Specialist, [address] [place] [address]  Phone: [phone], Fax: [phone]     I believe that the risk of significant morbidity and mortality related to the patient's current medical conditions are: Intermediate.       The documentation recorded by the scribe, accurately and completely reflects the services I personally performed and the decisions made by me. Dalton Davis MD, MS, F.A.C.C.  April 13, 2022

## 2022-04-25 ENCOUNTER — OFFICE VISIT (OUTPATIENT)
Dept: PULMONOLOGY | Age: 72
End: 2022-04-25
Payer: MEDICARE

## 2022-04-25 VITALS
RESPIRATION RATE: 18 BRPM | SYSTOLIC BLOOD PRESSURE: 113 MMHG | BODY MASS INDEX: 23.97 KG/M2 | TEMPERATURE: 97.8 F | HEIGHT: 63 IN | HEART RATE: 75 BPM | DIASTOLIC BLOOD PRESSURE: 72 MMHG | OXYGEN SATURATION: 98 % | WEIGHT: 135.3 LBS

## 2022-04-25 DIAGNOSIS — Z87.891 FORMER HEAVY TOBACCO SMOKER: ICD-10-CM

## 2022-04-25 DIAGNOSIS — Z86.16 HISTORY OF COVID-19: Primary | ICD-10-CM

## 2022-04-25 DIAGNOSIS — J44.9 STAGE 2 MODERATE COPD BY GOLD CLASSIFICATION (HCC): ICD-10-CM

## 2022-04-25 PROCEDURE — 1036F TOBACCO NON-USER: CPT | Performed by: NURSE PRACTITIONER

## 2022-04-25 PROCEDURE — G8420 CALC BMI NORM PARAMETERS: HCPCS | Performed by: NURSE PRACTITIONER

## 2022-04-25 PROCEDURE — 3023F SPIROM DOC REV: CPT | Performed by: NURSE PRACTITIONER

## 2022-04-25 PROCEDURE — 1090F PRES/ABSN URINE INCON ASSESS: CPT | Performed by: NURSE PRACTITIONER

## 2022-04-25 PROCEDURE — G8427 DOCREV CUR MEDS BY ELIG CLIN: HCPCS | Performed by: NURSE PRACTITIONER

## 2022-04-25 PROCEDURE — 99214 OFFICE O/P EST MOD 30 MIN: CPT | Performed by: NURSE PRACTITIONER

## 2022-04-25 PROCEDURE — 1123F ACP DISCUSS/DSCN MKR DOCD: CPT | Performed by: NURSE PRACTITIONER

## 2022-04-25 PROCEDURE — 99214 OFFICE O/P EST MOD 30 MIN: CPT

## 2022-04-25 PROCEDURE — 4040F PNEUMOC VAC/ADMIN/RCVD: CPT | Performed by: NURSE PRACTITIONER

## 2022-04-25 PROCEDURE — 3017F COLORECTAL CA SCREEN DOC REV: CPT | Performed by: NURSE PRACTITIONER

## 2022-04-25 PROCEDURE — G8399 PT W/DXA RESULTS DOCUMENT: HCPCS | Performed by: NURSE PRACTITIONER

## 2022-04-25 RX ORDER — FOLIC ACID 1 MG/1
1 TABLET ORAL DAILY
COMMUNITY

## 2022-04-25 ASSESSMENT — ENCOUNTER SYMPTOMS
SINUS PAIN: 0
RHINORRHEA: 0
STRIDOR: 0
COUGH: 0
NAUSEA: 0
VOMITING: 0
CONSTIPATION: 0
WHEEZING: 0
SHORTNESS OF BREATH: 0
CHEST TIGHTNESS: 0
DIARRHEA: 0
SINUS PRESSURE: 0

## 2022-04-25 NOTE — PATIENT INSTRUCTIONS
SURVEY:    You may be receiving a survey from [facility] regarding your visit today. Please complete the survey to enable us to provide the highest quality of care to you and your family. If you cannot score us a very good on any question, please call the office to discuss how we could have made your experience a very good one. Thank you.

## 2022-06-29 ENCOUNTER — HOSPITAL ENCOUNTER (OUTPATIENT)
Dept: LAB | Age: 72
Setting detail: SPECIMEN
Discharge: HOME | End: 2022-06-29
Payer: MEDICARE

## 2022-06-29 ENCOUNTER — HOSPITAL ENCOUNTER (OUTPATIENT)
Age: 72
Discharge: HOME | End: 2022-07-01
Payer: MEDICARE

## 2022-06-29 ENCOUNTER — HOSPITAL ENCOUNTER (OUTPATIENT)
Age: 72
Discharge: HOME | End: 2022-06-29
Payer: MEDICARE

## 2022-06-29 ENCOUNTER — HOSPITAL ENCOUNTER (OUTPATIENT)
Dept: GENERAL RADIOLOGY | Age: 72
Discharge: HOME | End: 2022-07-01
Payer: MEDICARE

## 2022-06-29 DIAGNOSIS — J06.9 UPPER RESPIRATORY TRACT INFECTION, UNSPECIFIED TYPE: ICD-10-CM

## 2022-06-29 LAB
FLU A ANTIGEN: NEGATIVE
FLU B ANTIGEN: NEGATIVE

## 2022-06-29 PROCEDURE — 71046 X-RAY EXAM CHEST 2 VIEWS: CPT

## 2022-06-29 PROCEDURE — U0003 INFECTIOUS AGENT DETECTION BY NUCLEIC ACID (DNA OR RNA); SEVERE ACUTE RESPIRATORY SYNDROME CORONAVIRUS 2 (SARS-COV-2) (CORONAVIRUS DISEASE [COVID-19]), AMPLIFIED PROBE TECHNIQUE, MAKING USE OF HIGH THROUGHPUT TECHNOLOGIES AS DESCRIBED BY CMS-2020-01-R: HCPCS

## 2022-06-29 PROCEDURE — U0005 INFEC AGEN DETEC AMPLI PROBE: HCPCS

## 2022-06-29 PROCEDURE — 87804 INFLUENZA ASSAY W/OPTIC: CPT

## 2022-06-29 PROCEDURE — C9803 HOPD COVID-19 SPEC COLLECT: HCPCS

## 2022-06-30 LAB
SARS-COV-2: ABNORMAL
SARS-COV-2: DETECTED
SOURCE: ABNORMAL

## 2022-07-14 ENCOUNTER — HOSPITAL ENCOUNTER (OUTPATIENT)
Age: 72
Discharge: HOME | End: 2022-07-14
Payer: MEDICARE

## 2022-07-14 DIAGNOSIS — E78.5 HYPERLIPIDEMIA WITH TARGET LDL LESS THAN 70: ICD-10-CM

## 2022-07-14 DIAGNOSIS — R79.9 ABNORMAL FINDING OF BLOOD CHEMISTRY, UNSPECIFIED: ICD-10-CM

## 2022-07-14 DIAGNOSIS — R53.83 FATIGUE, UNSPECIFIED TYPE: ICD-10-CM

## 2022-07-14 DIAGNOSIS — M94.9 DISORDER OF CARTILAGE, UNSPECIFIED: ICD-10-CM

## 2022-07-14 LAB
ALBUMIN SERPL-MCNC: 4.6 G/DL (ref 3.5–5.2)
ALBUMIN/GLOBULIN RATIO: 1.4 (ref 1–2.5)
ALP BLD-CCNC: 71 U/L (ref 35–104)
ALT SERPL-CCNC: 15 U/L (ref 5–33)
ANION GAP SERPL CALCULATED.3IONS-SCNC: 11 MMOL/L (ref 9–17)
AST SERPL-CCNC: 19 U/L
BILIRUB SERPL-MCNC: 0.47 MG/DL (ref 0.3–1.2)
BUN BLDV-MCNC: 12 MG/DL (ref 8–23)
BUN/CREAT BLD: 23 (ref 9–20)
CALCIUM SERPL-MCNC: 9.7 MG/DL (ref 8.6–10.4)
CHLORIDE BLD-SCNC: 100 MMOL/L (ref 98–107)
CHOLESTEROL/HDL RATIO: 3.5
CHOLESTEROL: 212 MG/DL
CO2: 24 MMOL/L (ref 20–31)
CREAT SERPL-MCNC: 0.53 MG/DL (ref 0.5–0.9)
GFR AFRICAN AMERICAN: >60 ML/MIN
GFR NON-AFRICAN AMERICAN: >60 ML/MIN
GFR SERPL CREATININE-BSD FRML MDRD: ABNORMAL ML/MIN/{1.73_M2}
GFR SERPL CREATININE-BSD FRML MDRD: ABNORMAL ML/MIN/{1.73_M2}
GLUCOSE BLD-MCNC: 85 MG/DL (ref 70–99)
HCT VFR BLD CALC: 37.6 % (ref 36.3–47.1)
HDLC SERPL-MCNC: 61 MG/DL
HEMOGLOBIN: 12.1 G/DL (ref 11.9–15.1)
LDL CHOLESTEROL: 128 MG/DL (ref 0–130)
MCH RBC QN AUTO: 33.1 PG (ref 25.2–33.5)
MCHC RBC AUTO-ENTMCNC: 32.2 G/DL (ref 28.4–34.8)
MCV RBC AUTO: 102.7 FL (ref 82.6–102.9)
NRBC AUTOMATED: 0 PER 100 WBC
PDW BLD-RTO: 12.6 % (ref 11.8–14.4)
PLATELET # BLD: 232 K/UL (ref 138–453)
PMV BLD AUTO: 9.2 FL (ref 8.1–13.5)
POTASSIUM SERPL-SCNC: 4.1 MMOL/L (ref 3.7–5.3)
RBC # BLD: 3.66 M/UL (ref 3.95–5.11)
SODIUM BLD-SCNC: 135 MMOL/L (ref 135–144)
TOTAL PROTEIN: 7.9 G/DL (ref 6.4–8.3)
TRIGL SERPL-MCNC: 114 MG/DL
TSH SERPL DL<=0.05 MIU/L-ACNC: 1.7 UIU/ML (ref 0.3–5)
VITAMIN D 25-HYDROXY: 31.4 NG/ML
WBC # BLD: 4.9 K/UL (ref 3.5–11.3)

## 2022-07-14 PROCEDURE — 85027 COMPLETE CBC AUTOMATED: CPT

## 2022-07-14 PROCEDURE — 83036 HEMOGLOBIN GLYCOSYLATED A1C: CPT

## 2022-07-14 PROCEDURE — 82306 VITAMIN D 25 HYDROXY: CPT

## 2022-07-14 PROCEDURE — 36415 COLL VENOUS BLD VENIPUNCTURE: CPT

## 2022-07-14 PROCEDURE — 80061 LIPID PANEL: CPT

## 2022-07-14 PROCEDURE — 84443 ASSAY THYROID STIM HORMONE: CPT

## 2022-07-14 PROCEDURE — 80053 COMPREHEN METABOLIC PANEL: CPT

## 2022-07-15 LAB
ESTIMATED AVERAGE GLUCOSE: 100 MG/DL
HBA1C MFR BLD: 5.1 % (ref 4–6)

## 2022-08-24 ENCOUNTER — HOSPITAL ENCOUNTER (OUTPATIENT)
Dept: WOMENS IMAGING | Age: 72
Discharge: HOME | End: 2022-08-26
Payer: MEDICARE

## 2022-08-24 DIAGNOSIS — Z12.31 BREAST CANCER SCREENING BY MAMMOGRAM: ICD-10-CM

## 2022-08-24 PROCEDURE — 77063 BREAST TOMOSYNTHESIS BI: CPT

## 2022-09-21 ENCOUNTER — HOSPITAL ENCOUNTER (OUTPATIENT)
Dept: SLEEP CENTER | Age: 72
Discharge: HOME | End: 2022-09-21
Payer: MEDICARE

## 2022-09-21 PROCEDURE — 95810 POLYSOM 6/> YRS 4/> PARAM: CPT

## 2022-09-21 ASSESSMENT — SLEEP AND FATIGUE QUESTIONNAIRES
HOW MANY NAPS DO YOU TAKE PER WEEK: 2
HOW LIKELY ARE YOU TO NOD OFF OR FALL ASLEEP WHILE LYING DOWN TO REST IN THE AFTERNOON WHEN CIRCUMSTANCES PERMIT: 3
DO YOU SNORE: YES
HOW LIKELY ARE YOU TO NOD OFF OR FALL ASLEEP WHILE SITTING AND TALKING TO SOMEONE: 0
DOES YOUR SNORING BOTHER OTHERS: NO
HOW LIKELY ARE YOU TO NOD OFF OR FALL ASLEEP WHILE SITTING QUIETLY AFTER LUNCH WITHOUT ALCOHOL: 0
HOW LIKELY ARE YOU TO NOD OFF OR FALL ASLEEP WHILE WATCHING TV: 0
WHAT TIME DO YOU USUALLY WAKE UP: 28800
WHAT TIME DO YOU USUALLY GO TO BED: 81000
NORMAL AMOUNT OF SLEEP PER NIGHT: 8
NUMBER OF TIMES YOU WAKE PER NIGHT: 0
HOW LIKELY ARE YOU TO NOD OFF OR FALL ASLEEP WHILE SITTING INACTIVE IN A PUBLIC PLACE: 0
ESS TOTAL SCORE: 3
HOW LIKELY ARE YOU TO NOD OFF OR FALL ASLEEP WHEN YOU ARE A PASSENGER IN A CAR FOR AN HOUR WITHOUT A BREAK: 0
I SLEEP WELL: NO
HOW LIKELY ARE YOU TO NOD OFF OR FALL ASLEEP IN A CAR, WHILE STOPPED FOR A FEW MINUTES IN TRAFFIC: 0
HOW LIKELY ARE YOU TO NOD OFF OR FALL ASLEEP WHILE SITTING AND READING: 0
USUAL AMOUNT OF TIME TO FALL ASLEEP (MIN): 60

## 2022-09-26 PROBLEM — G25.81 RESTLESS LEGS SYNDROME: Status: ACTIVE | Noted: 2022-09-26

## 2022-09-28 LAB — STATUS: NORMAL

## 2022-09-30 NOTE — RESULT ENCOUNTER NOTE
Please call pt and inform them their sleep study results are normal. Did show some mild RLS. No other tx at this time.

## 2022-10-25 ENCOUNTER — HOSPITAL ENCOUNTER (OUTPATIENT)
Age: 72
Discharge: HOME | End: 2022-10-25
Payer: MEDICARE

## 2022-10-25 LAB
ABSOLUTE EOS #: 0.17 K/UL (ref 0–0.44)
ABSOLUTE IMMATURE GRANULOCYTE: <0.03 K/UL (ref 0–0.3)
ABSOLUTE LYMPH #: 2.61 K/UL (ref 1.1–3.7)
ABSOLUTE MONO #: 0.47 K/UL (ref 0.1–1.2)
ANION GAP SERPL CALCULATED.3IONS-SCNC: 11 MMOL/L (ref 9–17)
BASOPHILS # BLD: 1 % (ref 0–2)
BASOPHILS ABSOLUTE: 0.03 K/UL (ref 0–0.2)
BUN BLDV-MCNC: 15 MG/DL (ref 8–23)
BUN/CREAT BLD: 28 (ref 9–20)
CALCIUM SERPL-MCNC: 9.7 MG/DL (ref 8.6–10.4)
CHLORIDE BLD-SCNC: 102 MMOL/L (ref 98–107)
CO2: 27 MMOL/L (ref 20–31)
CREAT SERPL-MCNC: 0.54 MG/DL (ref 0.5–0.9)
EKG ATRIAL RATE: 78 BPM
EKG P AXIS: 84 DEGREES
EKG P-R INTERVAL: 176 MS
EKG Q-T INTERVAL: 390 MS
EKG QRS DURATION: 82 MS
EKG QTC CALCULATION (BAZETT): 444 MS
EKG R AXIS: 43 DEGREES
EKG T AXIS: 68 DEGREES
EKG VENTRICULAR RATE: 78 BPM
EOSINOPHILS RELATIVE PERCENT: 3 % (ref 1–4)
GFR SERPL CREATININE-BSD FRML MDRD: >60 ML/MIN/1.73M2
GLUCOSE BLD-MCNC: 98 MG/DL (ref 70–99)
HCT VFR BLD CALC: 35.8 % (ref 36.3–47.1)
HEMOGLOBIN: 11.9 G/DL (ref 11.9–15.1)
IMMATURE GRANULOCYTES: 0 %
LYMPHOCYTES # BLD: 48 % (ref 24–43)
MCH RBC QN AUTO: 34.4 PG (ref 25.2–33.5)
MCHC RBC AUTO-ENTMCNC: 33.2 G/DL (ref 28.4–34.8)
MCV RBC AUTO: 103.5 FL (ref 82.6–102.9)
MONOCYTES # BLD: 9 % (ref 3–12)
NRBC AUTOMATED: 0 PER 100 WBC
PDW BLD-RTO: 12.9 % (ref 11.8–14.4)
PLATELET # BLD: 207 K/UL (ref 138–453)
PMV BLD AUTO: 9.5 FL (ref 8.1–13.5)
POTASSIUM SERPL-SCNC: 3.8 MMOL/L (ref 3.7–5.3)
RBC # BLD: 3.46 M/UL (ref 3.95–5.11)
SEG NEUTROPHILS: 39 % (ref 36–65)
SEGMENTED NEUTROPHILS ABSOLUTE COUNT: 2.12 K/UL (ref 1.5–8.1)
SODIUM BLD-SCNC: 140 MMOL/L (ref 135–144)
WBC # BLD: 5.4 K/UL (ref 3.5–11.3)

## 2022-10-25 PROCEDURE — 80048 BASIC METABOLIC PNL TOTAL CA: CPT

## 2022-10-25 PROCEDURE — 93010 ELECTROCARDIOGRAM REPORT: CPT | Performed by: INTERNAL MEDICINE

## 2022-10-25 PROCEDURE — 93005 ELECTROCARDIOGRAM TRACING: CPT | Performed by: ORTHOPAEDIC SURGERY

## 2022-10-25 PROCEDURE — 85025 COMPLETE CBC W/AUTO DIFF WBC: CPT

## 2022-10-25 PROCEDURE — 36415 COLL VENOUS BLD VENIPUNCTURE: CPT

## 2022-10-26 ENCOUNTER — OFFICE VISIT (OUTPATIENT)
Dept: CARDIOLOGY | Age: 72
End: 2022-10-26
Payer: MEDICARE

## 2022-10-26 VITALS
HEIGHT: 63 IN | DIASTOLIC BLOOD PRESSURE: 67 MMHG | SYSTOLIC BLOOD PRESSURE: 115 MMHG | OXYGEN SATURATION: 98 % | BODY MASS INDEX: 22.75 KG/M2 | RESPIRATION RATE: 16 BRPM | WEIGHT: 128.4 LBS | HEART RATE: 70 BPM

## 2022-10-26 DIAGNOSIS — I25.10 ASHD (ARTERIOSCLEROTIC HEART DISEASE): ICD-10-CM

## 2022-10-26 DIAGNOSIS — Z01.810 PREOP CARDIOVASCULAR EXAM: ICD-10-CM

## 2022-10-26 DIAGNOSIS — I10 PRIMARY HYPERTENSION: ICD-10-CM

## 2022-10-26 DIAGNOSIS — E78.2 MIXED HYPERLIPIDEMIA: ICD-10-CM

## 2022-10-26 DIAGNOSIS — I35.1 MODERATE AORTIC REGURGITATION: Primary | ICD-10-CM

## 2022-10-26 PROCEDURE — 3078F DIAST BP <80 MM HG: CPT | Performed by: FAMILY MEDICINE

## 2022-10-26 PROCEDURE — 1090F PRES/ABSN URINE INCON ASSESS: CPT | Performed by: FAMILY MEDICINE

## 2022-10-26 PROCEDURE — 93005 ELECTROCARDIOGRAM TRACING: CPT | Performed by: FAMILY MEDICINE

## 2022-10-26 PROCEDURE — 93010 ELECTROCARDIOGRAM REPORT: CPT | Performed by: FAMILY MEDICINE

## 2022-10-26 PROCEDURE — 1036F TOBACCO NON-USER: CPT | Performed by: FAMILY MEDICINE

## 2022-10-26 PROCEDURE — G8399 PT W/DXA RESULTS DOCUMENT: HCPCS | Performed by: FAMILY MEDICINE

## 2022-10-26 PROCEDURE — 1123F ACP DISCUSS/DSCN MKR DOCD: CPT | Performed by: FAMILY MEDICINE

## 2022-10-26 PROCEDURE — G8484 FLU IMMUNIZE NO ADMIN: HCPCS | Performed by: FAMILY MEDICINE

## 2022-10-26 PROCEDURE — G8420 CALC BMI NORM PARAMETERS: HCPCS | Performed by: FAMILY MEDICINE

## 2022-10-26 PROCEDURE — 99214 OFFICE O/P EST MOD 30 MIN: CPT | Performed by: FAMILY MEDICINE

## 2022-10-26 PROCEDURE — 3074F SYST BP LT 130 MM HG: CPT | Performed by: FAMILY MEDICINE

## 2022-10-26 PROCEDURE — G8427 DOCREV CUR MEDS BY ELIG CLIN: HCPCS | Performed by: FAMILY MEDICINE

## 2022-10-26 PROCEDURE — 3017F COLORECTAL CA SCREEN DOC REV: CPT | Performed by: FAMILY MEDICINE

## 2022-10-26 RX ORDER — ROPINIROLE 0.5 MG/1
0.5 TABLET, FILM COATED ORAL 3 TIMES DAILY
COMMUNITY
Start: 2022-10-19 | End: 2022-11-07

## 2022-10-26 RX ORDER — INFLIXIMAB 100 MG/10ML
5 INJECTION, POWDER, LYOPHILIZED, FOR SOLUTION INTRAVENOUS SEE ADMIN INSTRUCTIONS
COMMUNITY

## 2022-10-26 NOTE — PATIENT INSTRUCTIONS
SURVEY:    You may be receiving a survey from [facility] regarding your visit today. Please complete the survey to enable us to provide the highest quality of care to you and your family. If you cannot score us a very good on any question, please call the office to discuss how we could have made your experience a very good one. Thank you.

## 2022-10-26 NOTE — PROGRESS NOTES
Felicianoleonard Gresham am scribing for and in the presence of Dave Ernandez. Bud BECKMAN, MS, F.A.C.C..    Patient: Dane Pastrana  :   Date of Visit: 2022    REASON FOR VISIT / CONSULTATION: Follow up for CAD, Angina    Dear Regla Sun, APRN - CNP,    I had the pleasure of seeing your patient Dane Pastrana in followup today. As you know, Ms. Marquez is a 67 y.o. female with a history of coronary artery disease with a total of 4 stents, most recently in 2012. In 10/12 she had a repeat heart catheterization with a bit of a complication including severe chest pain after a left ventriculogram but this resolved. Ms. Marycarmen Azar underwent a repeat stress test in  which was abnormal and on her heart catheterization done 2018 she developed chest pressure as well as ST changes on left main engagement suspicious for coronary vasospasm. Her echocardiogram done 2018 shows moderate aortic regurgitation. Echo done on 2021 showed ejection fraction of >55%. The left ventricular cavity size is within normal limits and the left ventricular wall thickness is mildly increased. No definite specific wall motion abnormalities were identified. Aortic valve is sclerotic but opens well. Mild to moderate aortic regurgitation. Evidence of mild (grade I) diastolic dysfunction is seen. An anterior echo free space is seen suggestive of a small effusion or fat Pad. Stress test done on 2021 was largely abnormal, most consistent with low risk for ischemia. Ms. Marycarmen Azar had recent heart cath on 2022 at [facility] EF 55-60% No significant CAD. Patent right coronary artery and cx stents. Ms. Marycarmen Azar is here today for a 6 month follow up and to receive cardiac clearance for her trigger finger surgery on  with Dr. Mika Steen. She reports she has been doing well cardiac wise other than some shortness of breath at times but does use her inhalers and this helps.  She does have some lightheaded/dizziness when she takes her methotrexate once a week. She complains of every morning she wakes up, she feels hungover. This has been going on for a couple months. She does have anxiety but no more than usual to make this worse. She did follow with ESTELLE Hess CNP for this and was test for sleep apnea and she was negative for this. She denied any current chest pain, pressure or tightness. She denies feeling any palpitations. No bleeding problems, bowel issues, problems with her medications or any other concerns at this time. No cough, fever or chills. No abdominal pain, nausea or vomiting. Exercise Tolerance: Ms. Alphonse Harrington reports that she has a fairly good exercise tolerance. Her says that she could walk 1 mile without developing chest discomfort or significant shortness of breath. Past Medical History:   Diagnosis Date    Arthritis     CAD (coronary artery disease) 4/11, 9/12    4 total stents. 2 Stents in RCA, 1 in small OM1. H/O cardiovascular stress test 5/22/13, 6/25/14    Relatively normal without evidence of significant ischemia or infarction. global LV systolic function was normal w/o regional wall motion abnormalities. No significant EKG evidence of ischemia during monitoring w/o significant associated arrhythmias. Tay score is 5. H/O cardiovascular stress test 12/28/2017    Larglely normal myocardial perfusion imaging with soft tissue artifact but w/o evidence of significant myocardial ischemia or infarction. results are most consistent with low/intermediate risk for significant CAD    H/O echocardiogram 5/21/13, 6/24/14    EF>55%, mild concentric LVH    History of echocardiogram 11/27/2018    EF of 60%. evidence of moderate diastolic dysfunction is seen.      Hx of cardiac catheterization 08/09/2018    MIld CAD w/o any significant focal stenosis with a normal LV end diastolic pressure LVEDP interestingly the pt developed pretty pronounced ST depression on Lt main engagment with mod chest pressure which resolved o disengagement of the LT main    Hx of heart artery stent 10/06/2011    Lesion of Prox RCA 75% stenosis-Drug Eluting stent to RCA    Hx of percutaneous left heart catheterization 2011    LMCA, LAD & RAMUS- normal, LCX-lesion on Mid CX 40% stenosis, RCA-Patent stent, EF 60%    Hx of percutaneous left heart catheterization 2012    Normal renals, Patent RCA stents with mid LCX stenosis IVUS suggest 60-70% stenosis and due to continues ischemia, a LISA was inserted    Hx of percutaneous left heart catheterization 2012    LMCA & LAD -Normal, LCXMild luminal irregularities, Patent prior stent, RCA-Patent stents in Prox and mid segment    Hyperlipidemia     Idiopathic peripheral neuropathy     [place] [facility] and Dr. Shane Chand for neurology    Normal nuclear stress test 2012    Rheumatoid arthritis involving multiple sites with positive rheumatoid factor ([facility] [address]     Dr. Sotomayor Servant: Benadryl [diphenhydramine], Iv dye [iodides], and Pcn [penicillins] REVIEW OF SYSTEMS: 14 systems were reviewed. Pertinent positives and negatives as above, all else negative. Past Surgical History:   Procedure Laterality Date    CARDIAC CATHE[facility]IZATION Left 2018    [facility] significant coronary artery disease. Widely patent RCA stent.  However, the patient did have pretty severe ST depression on left main engagement with moderate chest pressure that resolved on disengagement    [address] [facility] [address]      disc replaced in neck    Social History:  Social History     Tobacco Use    Smoking status: Former     Packs/day: 2.00     Years: 20.00     Pack years: 40.00     Types: Cigarettes     Quit date: 2000     Years since quittin.4    Smokeless tobacco: Never   Vaping Use    Vaping Use: Never used   Substance Use Topics Alcohol use: Yes     Comment: social    Drug use: No        CURRENT MEDICATIONS:  Outpatient Medications Marked as Taking for the 10/26/22 encounter (Office Visit) with Charly Horner MD   Medication Sig Dispense Refill    inFLIXimab (REMICADE) 100 MG injection Infuse 5 mg/kg intravenously See Admin Instructions      rOPINIRole (REQUIP) 0.5 MG tablet Take 0.5 mg by mouth 3 times daily      ipratropium-albuterol (DUONEB) 0.5-2.5 (3) MG/3ML SOLN nebulizer solution Inhale 3 mLs into the lungs every 4 hours as needed for Shortness of Breath 159 mL 5    folic acid (FOLVITE) 1 MG tablet Take 1 mg by mouth daily      tiotropium (SPIRIVA RESPIMAT) 2.5 MCG/ACT AERS inhaler Inhale 2 puffs into the lungs daily 1 each 0    nitroGLYCERIN (NITROSTAT) 0.4 MG SL tablet DISSOLVE 1 TABLET UNDER THE TONGUE EVERY 5 MINUTES AS  NEEDED FOR CHEST PAIN. MAX  OF 3 TABLETS IN 15 MINUTES. CALL 911 IF PAIN PERSISTS. (Patient taking differently: DISSOLVE 1 TABLET UNDER THE TONGUE EVERY 5 MINUTES AS  NEEDED FOR CHEST PAIN. MAX  OF 3 TABLETS IN 15 MINUTES. CALL 911 IF PAIN PERSISTS. ) 75 tablet 4    ranolazine (RANEXA) 500 MG extended release tablet TAKE 2 TABLETS TWICE DAILY (Patient taking differently: 1,000 mg) 360 tablet 3    methotrexate (RHEUMATREX) 2.5 MG chemo tablet once a week 8 tabs weekly      VENTOLIN  (90 Base) MCG/ACT inhaler INHALE 2 PUFFS INTO THE LUNGS EVERY 6 HOURS AS NEEDED FOR WHEEZING 18 g 11    gabapentin (NEURONTIN) 300 MG capsule Take 1 capsule by mouth 3 times daily for 30 days.  Intended supply: 90 days 90 capsule 2    pantoprazole (PROTONIX) 40 MG tablet Take 1 tablet by mouth daily TAKE 1 TABLET BY MOUTH  DAILY 90 tablet 3    latanoprost (XALATAN) 0.005 % ophthalmic solution Place 1 drop into both eyes nightly       fluticasone-salmeterol (WIXELA INHUB) 250-50 MCG/DOSE AEPB Inhale 1 puff into the lungs every 12 hours 60 each 3    tiotropium (SPIRIVA HANDIHALER) 18 MCG inhalation capsule INHALE THE CONTENTS OF 1 CAPSULE BY MOUTH VIA  HANDIHALER DAILY 90 capsule 3    famciclovir (FAMVIR) 250 MG tablet Take 250 mg by mouth 2 times daily      timolol (BETIMOL) 0.5 % ophthalmic solution Place 1 drop into the right eye 2 times daily      RESTASIS 0.05 % ophthalmic emulsion Place 1 drop into both eyes 2 times daily as needed       aspirin 81 MG tablet Take 81 mg by mouth daily. FAMILY HISTORY: family history includes Cancer in her brother and mother. PHYSICAL EXAM:   /67 (Site: Right Upper Arm, Position: Sitting, Cuff Size: Medium Adult)   Pulse 70   Resp 16   Ht 5' 3\" (1.6 m)   Wt 128 lb 6.4 oz (58.2 kg)   LMP  (LMP Unknown)   SpO2 98%   BMI 22.75 kg/m²  Body mass index is 22.75 kg/m². Constitutional: She is oriented to person, place, and time. She appears well-developed and well-nourished. In no acute distress. HEENT: Normocephalic and atraumatic. No JVD present. Carotid bruit is not present. No mass and no thyromegaly present. No lymphadenopathy present. Cardiovascular: Normal rate, regular rhythm, normal heart sounds. Exam reveals no gallop and no friction rubs. 2/6 systolic murmur, 5th intercostal space on the LEFT in the mid-clavicular line (cardiac apex). Pulmonary/Chest: Effort normal and breath sounds normal. No respiratory distress. She has no wheezes, rhonchi or rales. Abdominal: Soft, non-tender. Bowel sounds and aorta are normal. She exhibits no organomegaly, mass or bruit. Extremities: No edema. No cyanosis and no clubbing. Pulses are 2+ radial and carotid pulses. 2+ dorsalis pedis and posterior tibial pulses bilaterally. Neurological: She is alert and oriented to person, place, and time. No evidence of gross cranial nerve deficit. Coordination appeared normal.   Skin: Skin is warm and dry. There is no rash or diaphoresis. Psychiatric: She has a normal mood and affect.  Her speech is normal and behavior is normal.      MOST RECENT LABS ON RECORD:   Lab Results   Component Value Date    WBC 5.4 10/25/2022    HGB 11.9 10/25/2022    HCT 35.8 (L) 10/25/2022     10/25/2022    CHOL 212 (H) 07/14/2022    TRIG 114 07/14/2022    HDL 61 07/14/2022    LDLCHOLESTEROL 128 07/14/2022    ALT 15 07/14/2022    AST 19 07/14/2022     10/25/2022    K 3.8 10/25/2022     10/25/2022    CREATININE 0.54 10/25/2022    BUN 15 10/25/2022    CO2 27 10/25/2022    TSH 1.70 07/14/2022    INR 0.9 07/14/2019    LABA1C 5.1 07/14/2022        ASSESSMENT:  Encounter Diagnoses   Name Primary? Preop cardiovascular exam Yes    ASHD (arteriosclerotic heart disease)     Primary hypertension     Mixed hyperlipidemia     Moderate aortic regurgitation         PLAN:    Pre-Op Clearance: Trigger finger release on 12/2/2022 by Dr. Pedro Alves using 2014 ACC/AHA guidelines   Emergent procedure No  Active Cardiac Condition No (decompensated HF, Arrhythmia, MI <3 weeks, severe valve disease)  Risk Level of Procedure Low Risk (endoscopy, superficial skin, breast, ambulatory, or cataract, etc.)  Revised Cardiac Risk Index Risk factors: History of ischemic heart disease  Measurement of Exercise Tolerance before Surgery >4 Yes  According to the 2014 ACC/AHA pre-operative risk assessment guidelines Jhonatan Franks is at low risk for major cardiac complications during a low risk procedure and may continue as planned. Specific medication recommendations are listed below. Medications recommended to continue should be taken with a sip of water even when NPO. Medical management to reduce perioperative risk:  Antiplatelet Agent: STOP aspirin  Anticoagulant Agent: Not applicable prior to the procedure. Anticoagulation Bridging: Not applicable  Additional Recommendations: I would also suggest that she continue her statin throughout the perioperative period.   Additional Testing List: I ordered a echocardiogram to better assess for the etiology of this problem and to help guide future management    ASHD:    Anti-anginal medications: Continue ranolazine (Ranexa) 1000 mg twice daily. Anti-anginal medications: Continue nitroglycerin 0.4 mg tablets as needed for chest pain. Atherosclerotic Heart Disease: Hx: Stents in 2012 with most likely history of coronary vasospasm: largely normal stress test on 8/5/2021, also has a long history of GERD and symptoms could be consistent with esophageal spasm due to esophagitis. Normal heart cath 4/12/2022. Antiplatelet Agent: Continue Aspirin 81 mg daily. Beta Blocker:  Unable to tolerate    Calcium Channel Blocker: Not indicated at this time. Statin Therapy: Continue atorvastatin (Lipitor) 40 mg nightly. Coronary Vasospasm: Evidence on Heart Catheterization 8/2018. largely normal stress test on 8/5/2021  Calcium Channel Blocker: Not indicated  Anti-anginal medications: Continue ranolazine (Ranexa) 1000 mg twice daily. Essential Hypertension: Controlled   Beta Blocker:  Unable to tolerate    ACE Inibitor/ARB: Not indicated at this time. Diuretics: Not indicated at this time. Calcium Channel Blocker: Not indicated at this time. Additional Testing List: None     Hyperlipidemia: Mixed LDL: 85 on 8/6/2021  Statin Therapy: Continue atorvastatin (Lipitor) 40 mg nightly. Moderate aortic regurgitation shown on echocardiogram done on 8/5/2021  Additional Testing List: I ordered a echocardiogram to better assess for the etiology of this problem and to help guide future management    In the meantime, I encouraged her to continue all of her current medications and follow up with you as previously scheduled. FOLLOW UP:   I told Ms. Marquez to call my office if she had any problems, but otherwise told her to Return in about 6 months (around 4/26/2023). However, I would be happy to see her sooner should the need arise. Once again, thank you for allowing me to participate in this patients care.  Please do not hesitate to contact me could I be of further assistance. Sincerely,  Keven Farrell MD, MS, F.A.C.C. [facility] Specialist, [address] [facility], [address]  Phone: [phone], Fax: [phone]     I believe that the risk of significant morbidity and mortality related to the patient's current medical conditions are: Intermediate. The documentation recorded by the scribe, accurately and completely reflects the services I personally performed and the decisions made by me. Cory Carlos MD, MS, F.A.C.C.  October 26, 2022

## 2022-11-18 ENCOUNTER — HOSPITAL ENCOUNTER (OUTPATIENT)
Dept: NON INVASIVE DIAGNOSTICS | Age: 72
Discharge: HOME | End: 2022-11-18
Payer: MEDICARE

## 2022-11-18 DIAGNOSIS — E78.2 MIXED HYPERLIPIDEMIA: ICD-10-CM

## 2022-11-18 DIAGNOSIS — Z01.810 PREOP CARDIOVASCULAR EXAM: ICD-10-CM

## 2022-11-18 DIAGNOSIS — I25.10 ASHD (ARTERIOSCLEROTIC HEART DISEASE): ICD-10-CM

## 2022-11-18 DIAGNOSIS — I35.1 MODERATE AORTIC REGURGITATION: ICD-10-CM

## 2022-11-18 DIAGNOSIS — I10 PRIMARY HYPERTENSION: ICD-10-CM

## 2022-11-18 LAB
LV EF: 55 %
LVEF MODALITY: NORMAL

## 2022-11-18 PROCEDURE — 93306 TTE W/DOPPLER COMPLETE: CPT

## 2022-11-21 ENCOUNTER — TELEPHONE (OUTPATIENT)
Dept: CARDIOLOGY | Age: 72
End: 2022-11-21

## 2022-11-21 NOTE — TELEPHONE ENCOUNTER
----- Message from Olaf Guevara MD sent at 11/19/2022 12:21 AM EST -----  Let Patient know aortic valve is a bit more leaky but nothing to worry about now. Will discuss at next visit. Thanks.

## 2022-11-21 NOTE — PROGRESS NOTES
Patient instructed per phone interview on the pre-operative, intra-operative, and post-operative process, as well as NPO status. Patient will take all medications with sip of water in am prior to procedure. Dr. Fleming Board recommended patient continue ASA throughout, see note in Epic. Pre-operative instruction sheet reviewed as well as CHG skin prep instructions. Verbalizes understanding.

## 2022-11-29 NOTE — H&P
History and Physical        CHIEF COMPLAINT: Right thumb pain    HISTORY OF PRESENT ILLNESS:      The patient is a 67 y.o. female  who presents with right thumb triggering and pain. Past Medical History:    Past Medical History:   Diagnosis Date    Arthritis     CAD (coronary artery disease) 4/11, 9/12    4 total stents. 2 Stents in RCA, 1 in small OM1. H/O cardiovascular stress test 5/22/13, 6/25/14    Relatively normal without evidence of significant ischemia or infarction. global LV systolic function was normal w/o regional wall motion abnormalities. No significant EKG evidence of ischemia during monitoring w/o significant associated arrhythmias. Tay score is 5. H/O cardiovascular stress test 12/28/2017    Larglely normal myocardial perfusion imaging with soft tissue artifact but w/o evidence of significant myocardial ischemia or infarction. results are most consistent with low/intermediate risk for significant CAD    H/O echocardiogram 5/21/13, 6/24/14    EF>55%, mild concentric LVH    History of echocardiogram 11/27/2018    EF of 60%. evidence of moderate diastolic dysfunction is seen.      Hx of cardiac catheterization 08/09/2018    MIld CAD w/o any significant focal stenosis with a normal LV end diastolic pressure LVEDP interestingly the pt developed pretty pronounced ST depression on Lt main engagment with mod chest pressure which resolved o disengagement of the LT main    Hx of heart artery stent 10/06/2011    Lesion of Prox RCA 75% stenosis-Drug Eluting stent to RCA    Hx of percutaneous left heart catheterization 04/07/2011    LMCA, LAD & RAMUS- normal, LCX-lesion on Mid CX 40% stenosis, RCA-Patent stent, EF 60%    Hx of percutaneous left heart catheterization 04/11/2012    Normal renals, Patent RCA stents with mid LCX stenosis IVUS suggest 60-70% stenosis and due to continues ischemia, a LISA was inserted    Hx of percutaneous left heart catheterization 09/27/2012    LMCA & LAD -Normal, LCXMild luminal irregularities, Patent prior stent, RCA-Patent stents in Prox and mid segment    Hyperlipidemia     Idiopathic peripheral neuropathy     [facility] and Dr. Urvashi [place] for neurology    Normal nuclear stress test 06/06/2012    Rheumatoid arthritis involving multiple sites with positive rheumatoid factor (HCC)     Dr. Iliana Moss [place]       Past Surgical History:    Past Surgical History:   Procedure Laterality Date    CARDIAC CATHETERIZATION Left 08/09/2018    [facility]ef[facility] Shen significant coronary artery disease. Widely patent RCA stent. However, the patient did have pretty severe ST depression on left main engagement with moderate chest pressure that resolved on disengagement    [facility] [address]      disc replaced in neck       Medications Prior to Admission:   Prior to Admission medications    Medication Sig Start Date End Date Taking? Authorizing Provider   pantoprazole (PROTONIX) 40 MG tablet Take 1 tablet by mouth daily 11/7/22   ESTELLE Bess CNP   gabapentin (NEURONTIN) 300 MG capsule Take 1 capsule by mouth 3 times daily for 180 days.  Intended supply: 90 days 11/7/22 5/6/23  ESTELLE Bess CNP   rOPINIRole (REQUIP) 0.5 MG tablet Take 2 tablets by mouth every evening 11/7/22   ESTELLE Bess CNP   inFLIXimab (REMICADE) 100 MG injection Infuse 5 mg/kg intravenously See Admin Instructions    Historical Provider, MD   atorvastatin (LIPITOR) 40 MG tablet Take 2 tablets by mouth every evening  Patient taking differently: Take 40 mg by mouth every evening 8/2/22   ESTELLE Bess CNP   ipratropium-albuterol (DUONEB) 0.5-2.5 (3) MG/3ML SOLN nebulizer solution Inhale 3 mLs into the lungs every 4 hours as needed for Shortness of Breath 6/30/22   ESTELLE Bess CNP   folic acid (FOLVITE) 1 MG tablet Take 1 mg by mouth daily Historical Provider, MD   tiotropium (SPIRIVA RESPIMAT) 2.5 MCG/ACT AERS inhaler Inhale 2 puffs into the lungs daily 4/25/22   Lori EarESTELLE herr CNP   adalimumab (HUMIRA) 40 MG/0.8ML injection 40 mg  Patient not taking: Reported on 10/26/2022 4/9/22   Historical Provider, MD   nitroGLYCERIN (NITROSTAT) 0.4 MG SL tablet DISSOLVE 1 TABLET UNDER THE TONGUE EVERY 5 MINUTES AS  NEEDED FOR CHEST PAIN. MAX  OF 3 TABLETS IN 15 MINUTES. CALL 911 IF PAIN PERSISTS. Patient taking differently: DISSOLVE 1 TABLET UNDER THE TONGUE EVERY 5 MINUTES AS  NEEDED FOR CHEST PAIN. MAX  OF 3 TABLETS IN 15 MINUTES. CALL 911 IF PAIN PERSISTS. 4/13/22   Rachele Khan MD   ranolazine (RANEXA) 500 MG extended release tablet TAKE 2 TABLETS TWICE DAILY  Patient taking differently: 1,000 mg 3/17/22   Rachele Khan MD   methotrexate (RHEUMATREX) 2.5 MG chemo tablet once a week 8 tabs weekly 11/23/21   Historical Provider, MD   VENTOLIN  (90 Base) MCG/ACT inhaler INHALE 2 PUFFS INTO THE LUNGS EVERY 6 HOURS AS NEEDED FOR WHEEZING 10/25/21   Lori EaringESTELLE CNP   latanoprost (XALATAN) 0.005 % ophthalmic solution Place 1 drop into both eyes nightly     Historical Provider, MD   fluticasone-salmeterol [facility]) 250-50 MCG/DOSE AEPB Inhale 1 puff into the lungs every 12 hours 4/26/21   Lori Earing, APRN - CNP   tiotropium (SPIRIVA HANDIHALER) 18 MCG inhalation capsule INHALE THE CONTENTS OF 1  CAPSULE BY MOUTH VIA  HANDIHALER DAILY 4/26/21   Lori EaringESTELLE CNP   famciclovir (FAMVIR) 250 MG tablet Take 250 mg by mouth 2 times daily    Historical Provider, MD   timolol (BETIMOL) 0.5 % ophthalmic solution Place 1 drop into the right eye 2 times daily    Historical Provider, MD   RESTASIS 0.05 % ophthalmic emulsion Place 1 drop into both eyes 2 times daily as needed  9/29/17   Historical Provider, MD   aspirin 81 MG tablet Take 81 mg by mouth daily.     Historical Provider, MD    Scheduled Meds:  Continuous Infusions:  PRN Meds:.      Allergies:  Benadryl [diphenhydramine], Iv dye [iodides], and Pcn [penicillins]    Social History:   Social History     Socioeconomic History    Marital status:      Spouse name: None    Number of children: None    Years of education: None    Highest education level: None   Tobacco Use    Smoking status: Former     Packs/day: 2.00     Years: 20.00     Pack years: 40.00     Types: Cigarettes     Quit date: 2000     Years since quittin.5    Smokeless tobacco: Never   Vaping Use    Vaping Use: Never used   Substance and Sexual Activity    Alcohol use: Yes     Comment: social    Drug use: No    Sexual activity: Not Currently     Social Determinants of Health     Stress: No Stress Concern Present    Feeling of Stress : Not at all   Social Connections:  Moderately Integrated    Frequency of Communication with Friends and Family: Twice a week    Frequency of Social Gatherings with Friends and Family: Once a week    Attends [employer] Services: 1 to 4 times per year    Active Member of [address] [place] [address] [employer] or Organizations: No    Attends Club or Organization Meetings: Never    Marital Status:    Intimate Partner Violence: Not At Risk    Fear of Current or Ex-Partner: No    Emotionally Abused: No    Physically Abused: No    Sexually Abused: No     Social History     Tobacco Use   Smoking Status Former    Packs/day: 2.00    Years: 20.00    Pack years: 40.00    Types: Cigarettes    Quit date: 2000    Years since quittin.5   Smokeless Tobacco Never     Social History     Substance and Sexual Activity   Alcohol Use Yes    Comment: social     Social History     Substance and Sexual Activity   Drug Use No       Family History:  Family History   Problem Relation Age of Onset    Cancer Mother         breast cancer    Cancer Brother          REVIEW OF SYSTEMS:  Gen: Negative for nausea, vomiting, diarrhea, fever, chills, night sweats  Heart: Negative for HTN, palpitations, chest pain  Lungs: Negative for wheezes, asthma or SOB  GI: Negative for nausea, vomiting  Endo: Negative for diabetes  Heme: Negative for DVT       PHYSICAL EXAM:  No data found. Gen: alert and oriented  Head: normorcephalic, atraumatic  Neck: supple  Heart: RRR  Lungs: No audible wheezes  Abdomen: soft  Pelvis: stable  Extremity right thumb tender nodule at the A1 pulley. Active triggering on exam.    DATA:  CBC:   Lab Results   Component Value Date/Time    WBC 5.4 10/25/2022 04:06 PM    HGB 11.9 10/25/2022 04:06 PM     10/25/2022 04:06 PM     06/06/2012 05:11 AM     BMP:    Lab Results   Component Value Date/Time     10/25/2022 04:06 PM    K 3.8 10/25/2022 04:06 PM     10/25/2022 04:06 PM    CO2 27 10/25/2022 04:06 PM    BUN 15 10/25/2022 04:06 PM    CREATININE 0.54 10/25/2022 04:06 PM    CALCIUM 9.7 10/25/2022 04:06 PM    GLUCOSE 98 10/25/2022 04:06 PM    GLUCOSE 96 06/05/2012 06:49 AM     PT/INR:    Lab Results   Component Value Date/Time    PROTIME 9.8 07/14/2019 02:20 PM    PROTIME 10.6 06/04/2012 06:41 PM    INR 0.9 07/14/2019 02:20 PM     Troponin:    Lab Results   Component Value Date/Time    TROPONINI <0.01 08/20/2013 02:59 AM    TROPONINI <0.01 06/05/2012 03:45 PM       Radiology:     ASSESSMENT:  Right trigger thumb    PLAN:  1) right trigger thumb release    H and P reviewed. No changes.   Manuela Robertson MD  10:46 AM  12/02/22      Manuela Robertson MD

## 2022-11-29 NOTE — DISCHARGE INSTRUCTIONS
SAME DAY SURGERY INSTRUCTIONS    1. Do not drive or operate hazardous machinery. 2. Do not make important personal or business decisions for 24 hours. 3. Do not drink alcoholic beverages. 4. Do not smoke tobacco products. 5. Eat light foods initially (i.e., Jell-O, soups, etc) and drink plenty of fluids. 6. If your bandages become soaked with a bright red blood, place another dressing pad over your bandages. (Do not remove original bandage.) Call your surgeon for further instructions. A small amount of bright red blood is to be expected. 7. Limit your activities for 48 hours. Do not engage in heavy work until your surgeon gives you permission. 8. Report the following signs or any questions regarding your physical condition to your surgeon immediately:   Excessive swelling of, or around, the wound area   Redness   Temperature of 101 (degrees F) or above    Excessive pain    9. Call your surgeon, [phone], for any questions regarding your surgery. Call [phone] for urgent questions after 5PM until 8AM    10. Call for an appointment to see your surgeon in 2 weeks. SPECIAL INSTRUCTIONS AND MEDICATIONS    1. No firm gripping, pushing, pulling, lifting more than several pounds for at least 2 weeks. 2. Move fingers and wrist to improve circulation. Work on restoring full finger range of motion. At your 2 week follow up appointment you should be able to make a complete fist.    3. Use prescribed pain pill as directed by the doctor. You may use ibuprofen or Tylenol if you prefer. 4. Keep your dressing on an dry unless instructed differently by your physician. 5. Use ice as instructed. 6. Remove operative bandages: 12/4. Place a band aid over incision or cover with new gauze and ace wrap    7. Keep incisions/dressings dry. If they happen to get wet remove wet dressing and place a new dry dressing.

## 2022-12-01 ENCOUNTER — ANESTHESIA EVENT (OUTPATIENT)
Dept: OPERATING ROOM | Age: 72
End: 2022-12-01
Payer: MEDICARE

## 2022-12-02 ENCOUNTER — HOSPITAL ENCOUNTER (OUTPATIENT)
Age: 72
Setting detail: OUTPATIENT SURGERY
Discharge: HOME | End: 2022-12-02
Attending: ORTHOPAEDIC SURGERY | Admitting: ORTHOPAEDIC SURGERY
Payer: MEDICARE

## 2022-12-02 ENCOUNTER — ANESTHESIA (OUTPATIENT)
Dept: OPERATING ROOM | Age: 72
End: 2022-12-02
Payer: MEDICARE

## 2022-12-02 VITALS
HEART RATE: 65 BPM | RESPIRATION RATE: 16 BRPM | DIASTOLIC BLOOD PRESSURE: 71 MMHG | WEIGHT: 130 LBS | TEMPERATURE: 97.1 F | HEIGHT: 63 IN | OXYGEN SATURATION: 98 % | BODY MASS INDEX: 23.04 KG/M2 | SYSTOLIC BLOOD PRESSURE: 134 MMHG

## 2022-12-02 DIAGNOSIS — G89.18 POSTOPERATIVE PAIN: Primary | ICD-10-CM

## 2022-12-02 PROCEDURE — 2500000003 HC RX 250 WO HCPCS: Performed by: NURSE ANESTHETIST, CERTIFIED REGISTERED

## 2022-12-02 PROCEDURE — 3600000002 HC SURGERY LEVEL 2 BASE: Performed by: ORTHOPAEDIC SURGERY

## 2022-12-02 PROCEDURE — 3600000012 HC SURGERY LEVEL 2 ADDTL 15MIN: Performed by: ORTHOPAEDIC SURGERY

## 2022-12-02 PROCEDURE — 2580000003 HC RX 258: Performed by: NURSE ANESTHETIST, CERTIFIED REGISTERED

## 2022-12-02 PROCEDURE — 3700000000 HC ANESTHESIA ATTENDED CARE: Performed by: ORTHOPAEDIC SURGERY

## 2022-12-02 PROCEDURE — 2500000003 HC RX 250 WO HCPCS: Performed by: ORTHOPAEDIC SURGERY

## 2022-12-02 PROCEDURE — 6370000000 HC RX 637 (ALT 250 FOR IP): Performed by: NURSE ANESTHETIST, CERTIFIED REGISTERED

## 2022-12-02 PROCEDURE — 3700000001 HC ADD 15 MINUTES (ANESTHESIA): Performed by: ORTHOPAEDIC SURGERY

## 2022-12-02 PROCEDURE — A4216 STERILE WATER/SALINE, 10 ML: HCPCS | Performed by: NURSE ANESTHETIST, CERTIFIED REGISTERED

## 2022-12-02 PROCEDURE — 7100000010 HC PHASE II RECOVERY - FIRST 15 MIN: Performed by: ORTHOPAEDIC SURGERY

## 2022-12-02 PROCEDURE — 2709999900 HC NON-CHARGEABLE SUPPLY: Performed by: ORTHOPAEDIC SURGERY

## 2022-12-02 PROCEDURE — 6360000002 HC RX W HCPCS: Performed by: NURSE ANESTHETIST, CERTIFIED REGISTERED

## 2022-12-02 PROCEDURE — 7100000011 HC PHASE II RECOVERY - ADDTL 15 MIN: Performed by: ORTHOPAEDIC SURGERY

## 2022-12-02 RX ORDER — DEXAMETHASONE SODIUM PHOSPHATE 4 MG/ML
INJECTION, SOLUTION INTRA-ARTICULAR; INTRALESIONAL; INTRAMUSCULAR; INTRAVENOUS; SOFT TISSUE PRN
Status: DISCONTINUED | OUTPATIENT
Start: 2022-12-02 | End: 2022-12-02 | Stop reason: SDUPTHER

## 2022-12-02 RX ORDER — LIDOCAINE HYDROCHLORIDE AND EPINEPHRINE 10; 10 MG/ML; UG/ML
INJECTION, SOLUTION INFILTRATION; PERINEURAL PRN
Status: DISCONTINUED | OUTPATIENT
Start: 2022-12-02 | End: 2022-12-02 | Stop reason: ALTCHOICE

## 2022-12-02 RX ORDER — BUPIVACAINE HYDROCHLORIDE 5 MG/ML
INJECTION, SOLUTION PERINEURAL PRN
Status: DISCONTINUED | OUTPATIENT
Start: 2022-12-02 | End: 2022-12-02 | Stop reason: ALTCHOICE

## 2022-12-02 RX ORDER — KETOROLAC TROMETHAMINE 30 MG/ML
INJECTION, SOLUTION INTRAMUSCULAR; INTRAVENOUS PRN
Status: DISCONTINUED | OUTPATIENT
Start: 2022-12-02 | End: 2022-12-02 | Stop reason: SDUPTHER

## 2022-12-02 RX ORDER — FENTANYL CITRATE 50 UG/ML
INJECTION, SOLUTION INTRAMUSCULAR; INTRAVENOUS PRN
Status: DISCONTINUED | OUTPATIENT
Start: 2022-12-02 | End: 2022-12-02 | Stop reason: SDUPTHER

## 2022-12-02 RX ORDER — ONDANSETRON 2 MG/ML
4 INJECTION INTRAMUSCULAR; INTRAVENOUS
Status: DISCONTINUED | OUTPATIENT
Start: 2022-12-02 | End: 2022-12-02 | Stop reason: HOSPADM

## 2022-12-02 RX ORDER — PROPOFOL 10 MG/ML
INJECTION, EMULSION INTRAVENOUS PRN
Status: DISCONTINUED | OUTPATIENT
Start: 2022-12-02 | End: 2022-12-02 | Stop reason: SDUPTHER

## 2022-12-02 RX ORDER — OXYCODONE HYDROCHLORIDE 5 MG/1
5 TABLET ORAL PRN
Status: DISCONTINUED | OUTPATIENT
Start: 2022-12-02 | End: 2022-12-02 | Stop reason: HOSPADM

## 2022-12-02 RX ORDER — LABETALOL HYDROCHLORIDE 5 MG/ML
10 INJECTION, SOLUTION INTRAVENOUS
Status: DISCONTINUED | OUTPATIENT
Start: 2022-12-02 | End: 2022-12-02 | Stop reason: HOSPADM

## 2022-12-02 RX ORDER — LIDOCAINE HYDROCHLORIDE 20 MG/ML
INJECTION, SOLUTION EPIDURAL; INFILTRATION; INTRACAUDAL; PERINEURAL PRN
Status: DISCONTINUED | OUTPATIENT
Start: 2022-12-02 | End: 2022-12-02 | Stop reason: SDUPTHER

## 2022-12-02 RX ORDER — SODIUM CHLORIDE 0.9 % (FLUSH) 0.9 %
5-40 SYRINGE (ML) INJECTION PRN
Status: DISCONTINUED | OUTPATIENT
Start: 2022-12-02 | End: 2022-12-02 | Stop reason: HOSPADM

## 2022-12-02 RX ORDER — HYDRALAZINE HYDROCHLORIDE 20 MG/ML
10 INJECTION INTRAMUSCULAR; INTRAVENOUS
Status: DISCONTINUED | OUTPATIENT
Start: 2022-12-02 | End: 2022-12-02 | Stop reason: HOSPADM

## 2022-12-02 RX ORDER — FENTANYL CITRATE 50 UG/ML
50 INJECTION, SOLUTION INTRAMUSCULAR; INTRAVENOUS EVERY 5 MIN PRN
Status: DISCONTINUED | OUTPATIENT
Start: 2022-12-02 | End: 2022-12-02 | Stop reason: HOSPADM

## 2022-12-02 RX ORDER — SODIUM CHLORIDE 9 MG/ML
INJECTION INTRAVENOUS PRN
Status: DISCONTINUED | OUTPATIENT
Start: 2022-12-02 | End: 2022-12-02 | Stop reason: SDUPTHER

## 2022-12-02 RX ORDER — PHENYLEPHRINE HYDROCHLORIDE 10 MG/ML
INJECTION INTRAVENOUS PRN
Status: DISCONTINUED | OUTPATIENT
Start: 2022-12-02 | End: 2022-12-02 | Stop reason: SDUPTHER

## 2022-12-02 RX ORDER — SODIUM CHLORIDE 0.9 % (FLUSH) 0.9 %
5-40 SYRINGE (ML) INJECTION EVERY 12 HOURS SCHEDULED
Status: DISCONTINUED | OUTPATIENT
Start: 2022-12-02 | End: 2022-12-02 | Stop reason: HOSPADM

## 2022-12-02 RX ORDER — SODIUM CHLORIDE, SODIUM LACTATE, POTASSIUM CHLORIDE, CALCIUM CHLORIDE 600; 310; 30; 20 MG/100ML; MG/100ML; MG/100ML; MG/100ML
INJECTION, SOLUTION INTRAVENOUS CONTINUOUS
Status: DISCONTINUED | OUTPATIENT
Start: 2022-12-02 | End: 2022-12-02 | Stop reason: HOSPADM

## 2022-12-02 RX ORDER — HYDROCODONE BITARTRATE AND ACETAMINOPHEN 5; 325 MG/1; MG/1
1 TABLET ORAL EVERY 6 HOURS PRN
Qty: 8 TABLET | Refills: 0 | Status: SHIPPED | OUTPATIENT
Start: 2022-12-02 | End: 2022-12-05

## 2022-12-02 RX ORDER — OXYCODONE HYDROCHLORIDE 5 MG/1
10 TABLET ORAL PRN
Status: DISCONTINUED | OUTPATIENT
Start: 2022-12-02 | End: 2022-12-02 | Stop reason: HOSPADM

## 2022-12-02 RX ORDER — ONDANSETRON 2 MG/ML
INJECTION INTRAMUSCULAR; INTRAVENOUS PRN
Status: DISCONTINUED | OUTPATIENT
Start: 2022-12-02 | End: 2022-12-02 | Stop reason: SDUPTHER

## 2022-12-02 RX ORDER — ACETAMINOPHEN 325 MG/1
650 TABLET ORAL ONCE
Status: COMPLETED | OUTPATIENT
Start: 2022-12-02 | End: 2022-12-02

## 2022-12-02 RX ORDER — CLINDAMYCIN PHOSPHATE 900 MG/50ML
900 INJECTION INTRAVENOUS
Status: COMPLETED | OUTPATIENT
Start: 2022-12-02 | End: 2022-12-02

## 2022-12-02 RX ORDER — SODIUM CHLORIDE 9 MG/ML
INJECTION, SOLUTION INTRAVENOUS PRN
Status: DISCONTINUED | OUTPATIENT
Start: 2022-12-02 | End: 2022-12-02 | Stop reason: HOSPADM

## 2022-12-02 RX ADMIN — ACETAMINOPHEN 650 MG: 325 TABLET ORAL at 11:23

## 2022-12-02 RX ADMIN — PROPOFOL 70 MG: 10 INJECTION, EMULSION INTRAVENOUS at 11:52

## 2022-12-02 RX ADMIN — PROPOFOL 50 MCG/KG/MIN: 10 INJECTION, EMULSION INTRAVENOUS at 11:57

## 2022-12-02 RX ADMIN — SODIUM CHLORIDE 20 ML: 9 INJECTION INTRAMUSCULAR; INTRAVENOUS; SUBCUTANEOUS at 12:03

## 2022-12-02 RX ADMIN — ONDANSETRON 4 MG: 2 INJECTION, SOLUTION INTRAMUSCULAR; INTRAVENOUS at 12:16

## 2022-12-02 RX ADMIN — LIDOCAINE HYDROCHLORIDE 50 MG: 20 INJECTION, SOLUTION EPIDURAL; INFILTRATION; INTRACAUDAL; PERINEURAL at 11:52

## 2022-12-02 RX ADMIN — KETOROLAC TROMETHAMINE 20 MG: 30 INJECTION, SOLUTION INTRAMUSCULAR at 12:21

## 2022-12-02 RX ADMIN — FENTANYL CITRATE 50 MCG: 50 INJECTION INTRAMUSCULAR; INTRAVENOUS at 11:49

## 2022-12-02 RX ADMIN — DEXAMETHASONE SODIUM PHOSPHATE 4 MG: 4 INJECTION, SOLUTION INTRAMUSCULAR; INTRAVENOUS at 12:19

## 2022-12-02 RX ADMIN — CLINDAMYCIN PHOSPHATE 900 MG: 900 INJECTION, SOLUTION INTRAVENOUS at 11:46

## 2022-12-02 RX ADMIN — SODIUM CHLORIDE, POTASSIUM CHLORIDE, SODIUM LACTATE AND CALCIUM CHLORIDE: 600; 310; 30; 20 INJECTION, SOLUTION INTRAVENOUS at 11:24

## 2022-12-02 RX ADMIN — PHENYLEPHRINE HYDROCHLORIDE 100 MCG: 10 INJECTION INTRAVENOUS at 12:03

## 2022-12-02 ASSESSMENT — ENCOUNTER SYMPTOMS: SHORTNESS OF BREATH: 1

## 2022-12-02 ASSESSMENT — PAIN - FUNCTIONAL ASSESSMENT: PAIN_FUNCTIONAL_ASSESSMENT: NONE - DENIES PAIN

## 2022-12-02 ASSESSMENT — LIFESTYLE VARIABLES: SMOKING_STATUS: 0

## 2022-12-02 NOTE — OP NOTE
Operative Note      Patient: Ulysses Butts  YOB: 1950  MRN: [id number]    Date of Procedure: 12/2/2022  Preoperative Diagnosis:right Trigger thumb  Postoperative Diagnosis: Same    Operative Procedure: right Trigger thumb release    Surgeon: Kennedy Fermin MD    Anesthesia: Local with MAC    Estimated Blood Loss: Minimal    Tourniquet Time: 3 minutes at 031 mmHg    Complications: None    Indications for surgery:    The patient has had painful triggering in the above noted fingers/fingers. Treatment options were discussed with the patient as well as risks and benefits and they have elected to proceed with the above surgery. Operative Procedure:    After informed consent was obtained the patient was brought to the operating room and placed in the supine position. Preoperatively they received intravenous antibiotics. Anesthetic agent was administered and local anesthesia was administered. The arm was prepped and draped in the usual sterile fashion. The arm was elevated, exsanguinated, and the tourniquet was inflated. A 1.5 cm incision was made in a preexisting transverse crease overlying the A1 pulley of the right thumb. Blunt dissection was carried down to the A1 pulley. The pulley was then incised from distal to proximal.  Complete release was performed. The underlying tendon was visualized and found to be normal.  The patient then flexed the digit and the triggering had resolved. The tourniquet was deflated. The wound was irrigated and closed with a nylon suture. A sterile dressing was placed. The patient was then brought to the recovery room. There were no intraoperative or postoperative complications.     Libra Horn MD  11:43 AM  12/2/2022

## 2022-12-02 NOTE — ANESTHESIA PRE PROCEDURE
Department of Anesthesiology  Preprocedure Note       Name:  Valerie Perera   Age:  67 y.o.  :  1950                                          MRN:  [id number]         Date:  2022      Surgeon: Alexandra Montgomery):  Sky Vigil MD    Procedure: Procedure(s): FINGER TRIGGER RELEASE- THUMB    Medications prior to admission:   Prior to Admission medications    Medication Sig Start Date End Date Taking? Authorizing Provider   pantoprazole (PROTONIX) 40 MG tablet Take 1 tablet by mouth daily 22   Lennox Flock, APRN - CNP   gabapentin (NEURONTIN) 300 MG capsule Take 1 capsule by mouth 3 times daily for 180 days. Intended supply: 90 days 22  Lennox Flock, APRN - CNP   rOPINIRole (REQUIP) 0.5 MG tablet Take 2 tablets by mouth every evening 22   Lennox Flock, APRN - CNP   inFLIXimab (REMICADE) 100 MG injection Infuse 5 mg/kg intravenously See Admin Instructions    Historical Provider, MD   atorvastatin (LIPITOR) 40 MG tablet Take 2 tablets by mouth every evening  Patient taking differently: Take 40 mg by mouth every evening 22   Lennox Flock, APRN - CNP   ipratropium-albuterol (DUONEB) 0.5-2.5 (3) MG/3ML SOLN nebulizer solution Inhale 3 mLs into the lungs every 4 hours as needed for Shortness of Breath 22   Lennox Flock, APRN - CNP   folic acid (FOLVITE) 1 MG tablet Take 1 mg by mouth daily    Historical Provider, MD   tiotropium (SPIRIVA RESPIMAT) 2.5 MCG/ACT AERS inhaler Inhale 2 puffs into the lungs daily 22   ESTELLE Dailey CNP   adalimumab (HUMIRA) 40 MG/0.8ML injection 40 mg  Patient not taking: No sig reported 22   Historical Provider, MD   nitroGLYCERIN (NITROSTAT) 0.4 MG SL tablet DISSOLVE 1 TABLET UNDER THE TONGUE EVERY 5 MINUTES AS  NEEDED FOR CHEST PAIN. MAX  OF 3 TABLETS IN 15 MINUTES. CALL 911 IF PAIN PERSISTS.   Patient not taking: Reported on 2022   Neil Jackson MD   ranolazine (RANEXA) 500 MG extended release tablet TAKE 2 TABLETS TWICE DAILY  Patient taking differently: 1,000 mg 3/17/22   Tj Sanford MD   methotrexate (RHEUMATREX) 2.5 MG chemo tablet once a week 8 tabs weekly 11/23/21   Historical Provider, MD JOHNSON  (90 Base) MCG/ACT inhaler INHALE 2 PUFFS INTO THE LUNGS EVERY 6 HOURS AS NEEDED FOR WHEEZING 10/25/21   ESTELLE Ruiz CNP   latanoprost (XALATAN) 0.005 % ophthalmic solution Place 1 drop into both eyes nightly     Historical Provider, MD   fluticasone-salmeterol [facility] INHUB) 250-50 MCG/DOSE AEPB Inhale 1 puff into the lungs every 12 hours 4/26/21   ESTELLE Ruiz CNP   tiotropium (SPIRIVA HANDIHALER) 18 MCG inhalation capsule INHALE THE CONTENTS OF 1  CAPSULE BY MOUTH VIA  HANDIHALER DAILY 4/26/21   ESTELLE Ruiz CNP   famciclovir (FAMVIR) 250 MG tablet Take 250 mg by mouth 2 times daily    Historical Provider, MD   timolol (BETIMOL) 0.5 % ophthalmic solution Place 1 drop into the right eye 2 times daily    Historical Provider, MD   RESTASIS 0.05 % ophthalmic emulsion Place 1 drop into both eyes 2 times daily as needed  9/29/17   Historical Provider, MD   aspirin 81 MG tablet Take 81 mg by mouth daily. Historical Provider, MD       Current medications:    Current Facility-Administered Medications   Medication Dose Route Frequency Provider Last Rate Last Admin    clindamycin (CLEOCIN) 900 mg in dextrose 5 % 50 mL IVPB  900 mg IntraVENous On Call to 309 Isabella Kapoor MD        lactated ringers infusion   IntraVENous Continuous ESTELLE Newberry CRNA 100 mL/hr at 12/02/22 1124 New Bag at 12/02/22 1124       Allergies:     Allergies   Allergen Reactions    Benadryl [Diphenhydramine] Hives    Iv Dye [Iodides] Hives and Shortness Of Breath    Pcn [Penicillins]        Problem List:    Patient Active Problem List   Diagnosis Code    ASHD (arteriosclerotic heart disease) I25.10    Hyperlipidemia with target LDL less than 70 E78.5    Unstable angina ([facility]) I20.0    Chest pain, atypical R07.89    GERD (gastroesophageal reflux disease) K21.9    Acute reaction to stress F43.0    Reactive airway disease that is not asthma J98.9    Superficial bruising of lower leg S80.10XA    Shortness of breath on exertion R06.02    Abnormal cardiovascular stress test R94.39    Coronary vasospasm (HCC) I20.1    Essential hypertension I10    Stage 2 moderate COPD by GOLD classification (formerly [facility]) J44.9    Non-cardiac chest pain R07.89    Chest pain in adult R07.9    Noninfected skin tear of right leg S81.811A    Restless legs syndrome G25.81       Past Medical History:        Diagnosis Date    Arthritis     CAD (coronary artery disease) 4/11, 9/12    4 total stents. 2 Stents in RCA, 1 in small OM1.  H/O cardiovascular stress test 5/22/13, 6/25/14    Relatively normal without evidence of significant ischemia or infarction. global LV systolic function was normal w/o regional wall motion abnormalities. No significant EKG evidence of ischemia during monitoring w/o significant associated arrhythmias. Tay score is 5.     H/O cardiovascular stress test 12/28/2017    Larglely normal myocardial perfusion imaging with soft tissue artifact but w/o evidence of significant myocardial ischemia or infarction. results are most consistent with low/intermediate risk for significant CAD    H/O echocardiogram 5/21/13, 6/24/14    EF>55%, mild concentric LVH    History of echocardiogram 11/27/2018    EF of 60%. evidence of moderate diastolic dysfunction is seen.      Hx of cardiac catheterization 08/09/2018    MIld CAD w/o any significant focal stenosis with a normal LV end diastolic pressure LVEDP interestingly the pt developed pretty pronounced ST depression on Lt main engagment with mod chest pressure which resolved o disengagement of the LT main    Hx of heart artery stent 10/06/2011    Lesion of Prox RCA 75% stenosis-Drug Eluting stent to RCA    Hx of percutaneous left heart catheterization 2011    LMCA, LAD & RAMUS- normal, LCX-lesion on Mid CX 40% stenosis, RCA-Patent stent, EF 60%    Hx of percutaneous left heart catheterization 2012    Normal renals, Patent RCA stents with mid LCX stenosis IVUS suggest 60-70% stenosis and due to continues ischemia, a LISA was inserted    Hx of percutaneous left heart catheterization 2012    LMCA & LAD -Normal, LCXMild luminal irregularities, Patent prior stent, RCA-Patent stents in Prox and mid segment    Hyperlipidemia     Idiopathic peripheral neuropathy     [facility] and Dr. Urvashi [place] for neurology    Normal nuclear stress test 2012    Rheumatoid arthritis involving multiple sites with positive rheumatoid factor ([facility] [address]     Dr. Barrientos [facility]       Past Surgical History:        Procedure Laterality Date    CARDIAC CATHETERIZATION Left 2018    right radial/ [employer] [place]/ Dr Estefania Shen significant coronary artery disease. Widely patent RCA stent.  However, the patient did have pretty severe ST depression on left main engagement with moderate chest pressure that resolved on disengagement    CARPAL [address]      disc replaced in neck       Social History:    Social History     Tobacco Use    Smoking status: Former     Packs/day: 2.00     Years: 20.00     Pack years: 40.00     Types: Cigarettes     Quit date: 2000     Years since quittin.5    Smokeless tobacco: Never   Substance Use Topics    Alcohol use: Yes     Comment: social                                Counseling given: Not Answered      Vital Signs (Current):   Vitals:    22 1109   BP: (!) 160/84   Pulse: 70   Resp: 16   Temp: 36.4 °C (97.5 °F)   TempSrc: Temporal   SpO2: 95%   Weight: 130 lb (59 kg)   Height: 5' 3\" (1.6 m)                                              BP Readings from Last 3 Encounters:   22 (!) 160/84 10/26/22 115/67   07/14/22 112/79       NPO Status: Time of last liquid consumption: 2100                        Time of last solid consumption: 1800                        Date of last liquid consumption: 12/01/22                        Date of last solid food consumption: 12/01/22    BMI:   Wt Readings from Last 3 Encounters:   12/02/22 130 lb (59 kg)   10/26/22 128 lb 6.4 oz (58.2 kg)   07/14/22 125 lb (56.7 kg)     Body mass index is 23.03 kg/m². CBC:   Lab Results   Component Value Date/Time    WBC 5.4 10/25/2022 04:06 PM    RBC 3.46 10/25/2022 04:06 PM    RBC 3.76 06/06/2012 05:11 AM    HGB 11.9 10/25/2022 04:06 PM    HCT 35.8 10/25/2022 04:06 PM    .5 10/25/2022 04:06 PM    RDW 12.9 10/25/2022 04:06 PM     10/25/2022 04:06 PM     06/06/2012 05:11 AM       CMP:   Lab Results   Component Value Date/Time     10/25/2022 04:06 PM    K 3.8 10/25/2022 04:06 PM     10/25/2022 04:06 PM    CO2 27 10/25/2022 04:06 PM    BUN 15 10/25/2022 04:06 PM    CREATININE 0.54 10/25/2022 04:06 PM    GFRAA >60 07/14/2022 11:34 AM    LABGLOM >60 10/25/2022 04:06 PM    GLUCOSE 98 10/25/2022 04:06 PM    GLUCOSE 96 06/05/2012 06:49 AM    PROT 7.9 07/14/2022 11:34 AM    CALCIUM 9.7 10/25/2022 04:06 PM    BILITOT 0.47 07/14/2022 11:34 AM    ALKPHOS 71 07/14/2022 11:34 AM    AST 19 07/14/2022 11:34 AM    ALT 15 07/14/2022 11:34 AM       POC Tests: No results for input(s): POCGLU, POCNA, POCK, POCCL, POCBUN, POCHEMO, POCHCT in the last 72 hours.     Coags:   Lab Results   Component Value Date/Time    PROTIME 9.8 07/14/2019 02:20 PM    PROTIME 10.6 06/04/2012 06:41 PM    INR 0.9 07/14/2019 02:20 PM    APTT 20.2 08/05/2021 10:26 AM       HCG (If Applicable): No results found for: PREGTESTUR, PREGSERUM, HCG, HCGQUANT     ABGs: No results found for: PHART, PO2ART, IZT4DZO, AHX6HKP, BEART, J3ZQHRBV     Type & Screen (If Applicable):  No results found for: LABABO, LABRH    Drug/Infectious Status (If Applicable):  No results found for: HIV, HEPCAB    COVID-19 Screening (If Applicable):   Lab Results   Component Value Date/Time    COVID19 DETECTED 06/29/2022 02:06 PM    COVID19 Detected 11/09/2020 11:00 AM           Anesthesia Evaluation  Patient summary reviewed and Nursing notes reviewed no history of anesthetic complications:   Airway: Mallampati: II  TM distance: >3 FB   Neck ROM: full  Mouth opening: > = 3 FB   Dental: normal exam         Pulmonary:normal exam    (+) COPD:  shortness of breath:      (-) not a current smoker                           Cardiovascular:  Exercise tolerance: good (>4 METS),   (+) hypertension:, angina: no interval change, CAD: no interval change, [place]:, hyperlipidemia      ECG reviewed      Echocardiogram reviewed               ROS comment: 4 cardiac stents    11/18/2022 08:54 AM  Dx: dyspnea, pre-op  Hx: ASHD, HTN, hyperlipidemia, CAD, stents  History / Tech. Comments:  Height: 63 inches Weight: 128 pounds BSA: 1.6 m²  BP: 115/67 mmHg  Allergies  l Penicillin.  l Contrast.  l *Unlisted: (BENADRYL) . CONCLUSIONS  Summary  Global left ventricular systolic function appears preserved with an estimated ejection fraction of 55%. The left ventricular cavity size is within normal limits and the left ventricular wall thickness is within normal limits. Normal aortic valve structure with at least moderate aortic regurgitation. Normal mitral valve structure with mild mitral regurgitation. Evidence of moderate diastolic dysfunction is seen.   Report Status: [address] [facility] [address] [facility], [address] [facility] [phone]  [address], [address] [facility] [phone]  [facility] [address] [facility], [facility], [address] [facility] [phone]  [facility] [address], [place], [address] [place] [address] [phone]  [place] [address] [phone] [facility] [address], [address] [phone]  [facility] [address] [facility], [facility]  Patient Name: Buck Monson CI #: [id number] Date of study: 11/18/2022 08:54 AM  Page: 1 of 3  Compared to the previous study of 8/6/21, the patients aortic regurgitation may have advanced from mild to moderate to at least moderate  without significant LV dilation. Signature  Electronically signed by Gabby Betancourt(Sonographer) on 11/18/2022 09:36 AM  Electronically signed by Ariel Farrell(Interpreting physician) on 11/18/2022 04:45 PM  FINDINGS  Left Atrium  Left atrium is normal in size. Left Ventricle  Global left ventricular systolic function appears preserved with an estimated ejection fraction of 55%. The left ventricular cavity size is within normal limits and the left ventricular wall thickness is within normal limits. Right Atrium  Right atrium is normal in size. Right Ventricle  Normal right ventricular size and function. Mitral Valve  Normal mitral valve structure with mild mitral regurgitation. Aortic Valve  Normal aortic valve structure with at least moderate aortic regurgitation. Tricuspid Valve  Normal tricuspid valve structure with trivial tricuspid regurgitation. Pulmonic Valve  The pulmonic valve is normal in structure. Pericardial Effusion  No significant pericardial effusion is seen. Miscellaneous  Evidence of moderate diastolic dysfunction is seen. Normal aortic root dimension. Neuro/Psych:   Negative Neuro/Psych ROS              GI/Hepatic/Renal:   (+) GERD: well controlled,           Endo/Other:    (+) : arthritis: rheumatoid. , .                 Abdominal:             Vascular: negative vascular ROS. Other Findings:           Anesthesia Plan      general and TIVA     ASA 3       Induction: intravenous. Anesthetic plan and risks discussed with patient and spouse.     Use of blood products discussed with whom consented to blood products. Plan discussed with CRNA.                     ESTELLE Moralez - DELLA   12/2/2022

## 2022-12-02 NOTE — ANESTHESIA POSTPROCEDURE EVALUATION
Department of Anesthesiology  Postprocedure Note    Patient: Alphonso Rodrigues  MRN: [id number]  YOB: 1950  Date of evaluation: 12/2/2022      Procedure Summary     Date: 12/02/22 Room / Location: [address] [place] [address]    Anesthesia Start: 8630 Anesthesia Stop: 0639    Procedure: [address] (Right) Diagnosis:       Trigger thumb of right hand      (RIGHT TRIGGER THUMB)    Surgeons: Nai Smalls MD Responsible Provider: ESTELLE Aburto CRNA    Anesthesia Type: general, TIVA ASA Status: 3          Anesthesia Type: No value filed.     Elvira Phase I:      Elvira Phase II: Elvira Score: 10      Anesthesia Post Evaluation    Patient location during evaluation: bedside  Patient participation: complete - patient participated  Level of consciousness: awake and alert  Pain score: 0  Airway patency: patent  Nausea & Vomiting: no nausea and no vomiting  Complications: no  Cardiovascular status: hemodynamically stable  Respiratory status: acceptable  Hydration status: stable  Multimodal analgesia pain management approach

## 2022-12-07 RX ORDER — RANOLAZINE 500 MG/1
TABLET, EXTENDED RELEASE ORAL
Qty: 360 TABLET | Refills: 3 | Status: SHIPPED | OUTPATIENT
Start: 2022-12-07

## 2023-01-19 ENCOUNTER — HOSPITAL ENCOUNTER (OUTPATIENT)
Age: 73
Discharge: HOME | End: 2023-01-19
Payer: MEDICARE

## 2023-01-19 DIAGNOSIS — G25.81 RESTLESS LEGS SYNDROME: ICD-10-CM

## 2023-01-19 DIAGNOSIS — E78.5 HYPERLIPIDEMIA WITH TARGET LDL LESS THAN 70: ICD-10-CM

## 2023-01-19 DIAGNOSIS — I25.10 ASHD (ARTERIOSCLEROTIC HEART DISEASE): ICD-10-CM

## 2023-01-19 PROBLEM — I20.1 CORONARY VASOSPASM (HCC): Status: RESOLVED | Noted: 2018-11-12 | Resolved: 2023-01-19

## 2023-01-19 LAB
ALBUMIN SERPL-MCNC: 4.3 G/DL (ref 3.5–5.2)
ALBUMIN/GLOBULIN RATIO: 1.4 (ref 1–2.5)
ALP BLD-CCNC: 69 U/L (ref 35–104)
ALT SERPL-CCNC: 26 U/L (ref 5–33)
ANION GAP SERPL CALCULATED.3IONS-SCNC: 9 MMOL/L (ref 9–17)
AST SERPL-CCNC: 25 U/L
BILIRUB SERPL-MCNC: 0.6 MG/DL (ref 0.3–1.2)
BUN BLDV-MCNC: 11 MG/DL (ref 8–23)
BUN/CREAT BLD: 22 (ref 9–20)
CALCIUM SERPL-MCNC: 9.6 MG/DL (ref 8.6–10.4)
CHLORIDE BLD-SCNC: 102 MMOL/L (ref 98–107)
CO2: 26 MMOL/L (ref 20–31)
CREAT SERPL-MCNC: 0.5 MG/DL (ref 0.5–0.9)
GFR SERPL CREATININE-BSD FRML MDRD: >60 ML/MIN/1.73M2
GLUCOSE BLD-MCNC: 83 MG/DL (ref 70–99)
MAGNESIUM: 2.2 MG/DL (ref 1.6–2.6)
POTASSIUM SERPL-SCNC: 5.4 MMOL/L (ref 3.7–5.3)
SODIUM BLD-SCNC: 137 MMOL/L (ref 135–144)
TOTAL PROTEIN: 7.4 G/DL (ref 6.4–8.3)

## 2023-01-19 PROCEDURE — 36415 COLL VENOUS BLD VENIPUNCTURE: CPT

## 2023-01-19 PROCEDURE — 80053 COMPREHEN METABOLIC PANEL: CPT

## 2023-01-19 PROCEDURE — 83735 ASSAY OF MAGNESIUM: CPT

## 2023-01-19 PROCEDURE — 80061 LIPID PANEL: CPT

## 2023-01-20 LAB
CHOLESTEROL/HDL RATIO: 3
CHOLESTEROL: 239 MG/DL
HDLC SERPL-MCNC: 81 MG/DL
LDL CHOLESTEROL: 137 MG/DL (ref 0–130)
TRIGL SERPL-MCNC: 106 MG/DL

## 2023-01-20 NOTE — RESULT ENCOUNTER NOTE
Please call pt and inform them their labwork results are abnormal.  Cholesterol levels still elevated and K level slightly higher. Recommend making sure drinking fluids and lower K diet, repeat bmp in 1 week.   Add on zetia to help with lipid control and need repeat lipids in 3 months and f/u

## 2023-02-03 ENCOUNTER — HOSPITAL ENCOUNTER (OUTPATIENT)
Age: 73
Discharge: HOME | End: 2023-02-03
Payer: MEDICARE

## 2023-02-03 DIAGNOSIS — E87.5 HIGH POTASSIUM: ICD-10-CM

## 2023-02-03 LAB
ANION GAP SERPL CALCULATED.3IONS-SCNC: 13 MMOL/L (ref 9–17)
BUN SERPL-MCNC: 9 MG/DL (ref 8–23)
BUN/CREAT BLD: 16 (ref 9–20)
CALCIUM SERPL-MCNC: 9.8 MG/DL (ref 8.6–10.4)
CHLORIDE SERPL-SCNC: 101 MMOL/L (ref 98–107)
CO2 SERPL-SCNC: 26 MMOL/L (ref 20–31)
CREAT SERPL-MCNC: 0.55 MG/DL (ref 0.5–0.9)
GFR SERPL CREATININE-BSD FRML MDRD: >60 ML/MIN/1.73M2
GLUCOSE SERPL-MCNC: 80 MG/DL (ref 70–99)
POTASSIUM SERPL-SCNC: 4.6 MMOL/L (ref 3.7–5.3)
SODIUM SERPL-SCNC: 140 MMOL/L (ref 135–144)

## 2023-02-03 PROCEDURE — 80048 BASIC METABOLIC PNL TOTAL CA: CPT

## 2023-02-03 PROCEDURE — 36415 COLL VENOUS BLD VENIPUNCTURE: CPT

## 2023-02-23 ENCOUNTER — HOSPITAL ENCOUNTER (OUTPATIENT)
Dept: MRI IMAGING | Age: 73
Discharge: HOME | End: 2023-02-25
Payer: MEDICARE

## 2023-02-23 DIAGNOSIS — M50.322 OTHER CERVICAL DISC DEGENERATION AT C5-C6 LEVEL: ICD-10-CM

## 2023-02-23 DIAGNOSIS — M50.323 OTHER CERVICAL DISC DEGENERATION AT C6-C7 LEVEL: ICD-10-CM

## 2023-02-23 DIAGNOSIS — M25.511 RIGHT SHOULDER PAIN, UNSPECIFIED CHRONICITY: ICD-10-CM

## 2023-02-23 DIAGNOSIS — M54.12 CERVICAL RADICULITIS: ICD-10-CM

## 2023-02-23 PROCEDURE — 72141 MRI NECK SPINE W/O DYE: CPT

## 2023-02-23 PROCEDURE — 73221 MRI JOINT UPR EXTREM W/O DYE: CPT

## 2023-04-18 ENCOUNTER — HOSPITAL ENCOUNTER (OUTPATIENT)
Age: 73
Discharge: HOME | End: 2023-04-18
Payer: MEDICARE

## 2023-04-18 LAB
25(OH)D3 SERPL-MCNC: 26 NG/ML
ALBUMIN SERPL-MCNC: 4 G/DL (ref 3.5–5.2)
ALBUMIN/GLOBULIN RATIO: 1.4 (ref 1–2.5)
ALP SERPL-CCNC: 57 U/L (ref 35–104)
ALT SERPL-CCNC: 19 U/L (ref 5–33)
ANION GAP SERPL CALCULATED.3IONS-SCNC: 8 MMOL/L (ref 9–17)
AST SERPL-CCNC: 17 U/L
BILIRUB SERPL-MCNC: 0.4 MG/DL (ref 0.3–1.2)
BUN SERPL-MCNC: 11 MG/DL (ref 8–23)
BUN/CREAT BLD: 16 (ref 9–20)
CALCIUM SERPL-MCNC: 9.7 MG/DL (ref 8.6–10.4)
CHLORIDE SERPL-SCNC: 106 MMOL/L (ref 98–107)
CHOLEST SERPL-MCNC: 217 MG/DL
CHOLESTEROL/HDL RATIO: 3
CO2 SERPL-SCNC: 29 MMOL/L (ref 20–31)
CREAT SERPL-MCNC: 0.67 MG/DL (ref 0.5–0.9)
CRP SERPL HS-MCNC: <3 MG/L (ref 0–5)
ERYTHROCYTE [SEDIMENTATION RATE] IN BLOOD BY WESTERGREN METHOD: 10 MM/HR (ref 0–30)
EST. AVERAGE GLUCOSE BLD GHB EST-MCNC: 105 MG/DL
FOLATE SERPL-MCNC: >20 NG/ML
GFR SERPL CREATININE-BSD FRML MDRD: >60 ML/MIN/1.73M2
GLUCOSE SERPL-MCNC: 84 MG/DL (ref 70–99)
HBA1C MFR BLD: 5.3 % (ref 4–6)
HDLC SERPL-MCNC: 73 MG/DL
LDLC SERPL CALC-MCNC: 125 MG/DL (ref 0–130)
POTASSIUM SERPL-SCNC: 4.6 MMOL/L (ref 3.7–5.3)
PROT SERPL-MCNC: 6.8 G/DL (ref 6.4–8.3)
RHEUMATOID FACTOR: 10.2 IU/ML
SODIUM SERPL-SCNC: 143 MMOL/L (ref 135–144)
TRIGL SERPL-MCNC: 94 MG/DL
TSH SERPL-ACNC: 2.04 UIU/ML (ref 0.3–5)
VIT B12 SERPL-MCNC: 414 PG/ML (ref 232–1245)

## 2023-04-18 PROCEDURE — 83921 ORGANIC ACID SINGLE QUANT: CPT

## 2023-04-18 PROCEDURE — 82175 ASSAY OF ARSENIC: CPT

## 2023-04-18 PROCEDURE — 82607 VITAMIN B-12: CPT

## 2023-04-18 PROCEDURE — 86140 C-REACTIVE PROTEIN: CPT

## 2023-04-18 PROCEDURE — 86334 IMMUNOFIX E-PHORESIS SERUM: CPT

## 2023-04-18 PROCEDURE — 80061 LIPID PANEL: CPT

## 2023-04-18 PROCEDURE — 82300 ASSAY OF CADMIUM: CPT

## 2023-04-18 PROCEDURE — 82746 ASSAY OF FOLIC ACID SERUM: CPT

## 2023-04-18 PROCEDURE — 86038 ANTINUCLEAR ANTIBODIES: CPT

## 2023-04-18 PROCEDURE — 83036 HEMOGLOBIN GLYCOSYLATED A1C: CPT

## 2023-04-18 PROCEDURE — 86431 RHEUMATOID FACTOR QUANT: CPT

## 2023-04-18 PROCEDURE — 84443 ASSAY THYROID STIM HORMONE: CPT

## 2023-04-18 PROCEDURE — 83825 ASSAY OF MERCURY: CPT

## 2023-04-18 PROCEDURE — 82525 ASSAY OF COPPER: CPT

## 2023-04-18 PROCEDURE — 82306 VITAMIN D 25 HYDROXY: CPT

## 2023-04-18 PROCEDURE — 80053 COMPREHEN METABOLIC PANEL: CPT

## 2023-04-18 PROCEDURE — 86235 NUCLEAR ANTIGEN ANTIBODY: CPT

## 2023-04-18 PROCEDURE — 36415 COLL VENOUS BLD VENIPUNCTURE: CPT

## 2023-04-18 PROCEDURE — 86225 DNA ANTIBODY NATIVE: CPT

## 2023-04-18 PROCEDURE — 85652 RBC SED RATE AUTOMATED: CPT

## 2023-04-18 PROCEDURE — 84207 ASSAY OF VITAMIN B-6: CPT

## 2023-04-18 PROCEDURE — 83655 ASSAY OF LEAD: CPT

## 2023-04-20 LAB
ANA SER QL IA: NEGATIVE
ARSENIC, BLOOD: <10 UG/L
CADMIUM: <1 UG/L
COPPER: 112.5 UG/DL (ref 80–155)
DSDNA IGG SER QL IA: <0.5 IU/ML
ENA SCL70 IGG SER IA-ACNC: 3 U/ML
ENA SM IGG SER-ACNC: 1.1 U/ML
ENA SS-A IGG SER QL: <0.3 U/ML
ENA SS-B IGG SER IA-ACNC: <0.3 U/ML
LEAD RBC-MCNC: 2 UG/DL (ref 0–4)
MERCURY, BLOOD: <2.5 UG/L
METHYLMALONIC ACID: 0.15 UMOL/L (ref 0–0.4)
NUCLEAR IGG SER IA-RTO: 0.3 U/ML
PATHOLOGIST: NORMAL
SERUM IFX INTERP: NORMAL
U1 SNRNP IGG SER IA-ACNC: 1.5 U/ML

## 2023-04-21 LAB — VITAMIN B6: 27.3 NMOL/L (ref 20–125)

## 2023-04-24 ENCOUNTER — OFFICE VISIT (OUTPATIENT)
Dept: PULMONOLOGY | Age: 73
End: 2023-04-24
Payer: MEDICARE

## 2023-04-24 VITALS
HEIGHT: 63 IN | WEIGHT: 127.1 LBS | RESPIRATION RATE: 16 BRPM | SYSTOLIC BLOOD PRESSURE: 118 MMHG | BODY MASS INDEX: 22.52 KG/M2 | DIASTOLIC BLOOD PRESSURE: 72 MMHG | OXYGEN SATURATION: 97 % | HEART RATE: 75 BPM | TEMPERATURE: 96.7 F

## 2023-04-24 DIAGNOSIS — J44.9 STAGE 2 MODERATE COPD BY GOLD CLASSIFICATION (HCC): ICD-10-CM

## 2023-04-24 PROCEDURE — G8420 CALC BMI NORM PARAMETERS: HCPCS | Performed by: INTERNAL MEDICINE

## 2023-04-24 PROCEDURE — G8427 DOCREV CUR MEDS BY ELIG CLIN: HCPCS | Performed by: INTERNAL MEDICINE

## 2023-04-24 PROCEDURE — 3017F COLORECTAL CA SCREEN DOC REV: CPT | Performed by: INTERNAL MEDICINE

## 2023-04-24 PROCEDURE — 3078F DIAST BP <80 MM HG: CPT | Performed by: INTERNAL MEDICINE

## 2023-04-24 PROCEDURE — 99214 OFFICE O/P EST MOD 30 MIN: CPT | Performed by: INTERNAL MEDICINE

## 2023-04-24 PROCEDURE — 1036F TOBACCO NON-USER: CPT | Performed by: INTERNAL MEDICINE

## 2023-04-24 PROCEDURE — 3074F SYST BP LT 130 MM HG: CPT | Performed by: INTERNAL MEDICINE

## 2023-04-24 PROCEDURE — 1123F ACP DISCUSS/DSCN MKR DOCD: CPT | Performed by: INTERNAL MEDICINE

## 2023-04-24 PROCEDURE — G8399 PT W/DXA RESULTS DOCUMENT: HCPCS | Performed by: INTERNAL MEDICINE

## 2023-04-24 PROCEDURE — 3023F SPIROM DOC REV: CPT | Performed by: INTERNAL MEDICINE

## 2023-04-24 PROCEDURE — 1090F PRES/ABSN URINE INCON ASSESS: CPT | Performed by: INTERNAL MEDICINE

## 2023-04-24 RX ORDER — ALBUTEROL SULFATE 90 UG/1
2 AEROSOL, METERED RESPIRATORY (INHALATION) EVERY 6 HOURS PRN
Qty: 18 G | Refills: 11 | Status: SHIPPED | OUTPATIENT
Start: 2023-04-24 | End: 2023-05-24

## 2023-04-24 RX ORDER — TIOTROPIUM BROMIDE 18 UG/1
CAPSULE ORAL; RESPIRATORY (INHALATION)
Qty: 90 CAPSULE | Refills: 3 | Status: SHIPPED | OUTPATIENT
Start: 2023-04-24

## 2023-04-25 ENCOUNTER — TELEPHONE (OUTPATIENT)
Dept: PULMONOLOGY | Age: 73
End: 2023-04-25

## 2023-04-25 DIAGNOSIS — J44.9 STAGE 2 MODERATE COPD BY GOLD CLASSIFICATION (HCC): Primary | ICD-10-CM

## 2023-04-25 RX ORDER — FLUTICASONE PROPIONATE AND SALMETEROL 113; 14 UG/1; UG/1
1 POWDER, METERED RESPIRATORY (INHALATION) 2 TIMES DAILY
Qty: 1 EACH | Refills: 2 | Status: SHIPPED | OUTPATIENT
Start: 2023-04-25 | End: 2023-04-25 | Stop reason: CLARIF

## 2023-04-25 RX ORDER — FLUTICASONE PROPIONATE AND SALMETEROL 113; 14 UG/1; UG/1
1 POWDER, METERED RESPIRATORY (INHALATION) 2 TIMES DAILY
Qty: 30 EACH | Refills: 2 | Status: SHIPPED | OUTPATIENT
Start: 2023-04-25 | End: 2023-05-25

## 2023-04-25 NOTE — TELEPHONE ENCOUNTER
Patient called to report spiriva is too expensive. She called insurance and they said air duo was covered. She would like it called into [facility] in [place] so she can try it.   Please advise

## 2023-04-25 NOTE — TELEPHONE ENCOUNTER
I called and left a voicemail for Patience that  did send a new prescription to [facility] as requested. To call with any further questions or concerns.

## 2023-04-26 ENCOUNTER — OFFICE VISIT (OUTPATIENT)
Dept: CARDIOLOGY | Age: 73
End: 2023-04-26
Payer: MEDICARE

## 2023-04-26 ENCOUNTER — HOSPITAL ENCOUNTER (OUTPATIENT)
Age: 73
Discharge: HOME | End: 2023-04-26
Payer: MEDICARE

## 2023-04-26 VITALS
HEIGHT: 63 IN | RESPIRATION RATE: 18 BRPM | OXYGEN SATURATION: 99 % | DIASTOLIC BLOOD PRESSURE: 85 MMHG | WEIGHT: 127 LBS | HEART RATE: 78 BPM | BODY MASS INDEX: 22.5 KG/M2 | SYSTOLIC BLOOD PRESSURE: 127 MMHG

## 2023-04-26 DIAGNOSIS — R53.83 OTHER FATIGUE: ICD-10-CM

## 2023-04-26 DIAGNOSIS — Z95.820 S/P ANGIOPLASTY WITH STENT: ICD-10-CM

## 2023-04-26 DIAGNOSIS — R42 LIGHTHEADED: ICD-10-CM

## 2023-04-26 DIAGNOSIS — R42 DIZZINESS: ICD-10-CM

## 2023-04-26 DIAGNOSIS — I25.10 CORONARY ARTERY DISEASE INVOLVING NATIVE CORONARY ARTERY OF NATIVE HEART WITHOUT ANGINA PECTORIS: Primary | ICD-10-CM

## 2023-04-26 DIAGNOSIS — I10 ESSENTIAL HYPERTENSION: ICD-10-CM

## 2023-04-26 DIAGNOSIS — R53.83 TIRED: ICD-10-CM

## 2023-04-26 DIAGNOSIS — E78.2 MIXED HYPERLIPIDEMIA: ICD-10-CM

## 2023-04-26 DIAGNOSIS — I25.10 CORONARY ARTERY DISEASE INVOLVING NATIVE CORONARY ARTERY OF NATIVE HEART WITHOUT ANGINA PECTORIS: ICD-10-CM

## 2023-04-26 DIAGNOSIS — I35.1 MODERATE AORTIC REGURGITATION: ICD-10-CM

## 2023-04-26 LAB
HCT VFR BLD AUTO: 38.8 % (ref 36.3–47.1)
HGB BLD-MCNC: 12.8 G/DL (ref 11.9–15.1)
MCH RBC QN AUTO: 34.7 PG (ref 25.2–33.5)
MCHC RBC AUTO-ENTMCNC: 33 G/DL (ref 28.4–34.8)
MCV RBC AUTO: 105.1 FL (ref 82.6–102.9)
NRBC AUTOMATED: 0 PER 100 WBC
PDW BLD-RTO: 13.2 % (ref 11.8–14.4)
PLATELET # BLD AUTO: 268 K/UL (ref 138–453)
PMV BLD AUTO: 9.1 FL (ref 8.1–13.5)
RBC # BLD: 3.69 M/UL (ref 3.95–5.11)
WBC # BLD AUTO: 5.5 K/UL (ref 3.5–11.3)

## 2023-04-26 PROCEDURE — 99214 OFFICE O/P EST MOD 30 MIN: CPT | Performed by: FAMILY MEDICINE

## 2023-04-26 PROCEDURE — 1036F TOBACCO NON-USER: CPT | Performed by: FAMILY MEDICINE

## 2023-04-26 PROCEDURE — 3074F SYST BP LT 130 MM HG: CPT | Performed by: FAMILY MEDICINE

## 2023-04-26 PROCEDURE — 3079F DIAST BP 80-89 MM HG: CPT | Performed by: FAMILY MEDICINE

## 2023-04-26 PROCEDURE — 36415 COLL VENOUS BLD VENIPUNCTURE: CPT

## 2023-04-26 PROCEDURE — G8399 PT W/DXA RESULTS DOCUMENT: HCPCS | Performed by: FAMILY MEDICINE

## 2023-04-26 PROCEDURE — 1123F ACP DISCUSS/DSCN MKR DOCD: CPT | Performed by: FAMILY MEDICINE

## 2023-04-26 PROCEDURE — 3017F COLORECTAL CA SCREEN DOC REV: CPT | Performed by: FAMILY MEDICINE

## 2023-04-26 PROCEDURE — 99211 OFF/OP EST MAY X REQ PHY/QHP: CPT | Performed by: FAMILY MEDICINE

## 2023-04-26 PROCEDURE — 1090F PRES/ABSN URINE INCON ASSESS: CPT | Performed by: FAMILY MEDICINE

## 2023-04-26 PROCEDURE — 85027 COMPLETE CBC AUTOMATED: CPT

## 2023-04-26 PROCEDURE — G8427 DOCREV CUR MEDS BY ELIG CLIN: HCPCS | Performed by: FAMILY MEDICINE

## 2023-04-26 PROCEDURE — G8420 CALC BMI NORM PARAMETERS: HCPCS | Performed by: FAMILY MEDICINE

## 2023-04-26 NOTE — PROGRESS NOTES
Stephon Miranda am scribing for and in the presence of Yuko Etienne. Bud BECKMAN, MS, F.A.C.C..    Patient: Dai Kuo  :   Date of Visit: 2023    REASON FOR VISIT / CONSULTATION: Follow up for CAD, Angina    Dear Vazquez Coker, APRN - CNP,    I had the pleasure of seeing your patient Dai Kuo in followup today. As you know, Ms. Marquez is a 67 y.o. female with a history of coronary artery disease with a total of 4 stents, most recently in 2012. In 10/12 she had a repeat heart catheterization with a bit of a complication including severe chest pain after a left ventriculogram but this resolved. Ms. Donavan Montenegro underwent a repeat stress test in  which was abnormal and on her heart catheterization done 2018 she developed chest pressure as well as ST changes on left main engagement suspicious for coronary vasospasm. Her echocardiogram done 2018 shows moderate aortic regurgitation. Echo done on 2021 showed ejection fraction of >55%. The left ventricular cavity size is within normal limits and the left ventricular wall thickness is mildly increased. No definite specific wall motion abnormalities were identified. Aortic valve is sclerotic but opens well. Mild to moderate aortic regurgitation. Evidence of mild (grade I) diastolic dysfunction is seen. An anterior echo free space is seen suggestive of a small effusion or fat Pad. Stress test done on 2021 was largely abnormal, most consistent with low risk for ischemia. Ms. Donavan Montenegro had recent heart cath on 2022 at [facility] EF 55-60% No significant CAD. Patent right coronary artery and cx stents. Echo done on 10/26/2022 compared to the previous study of 21, the patients aortic regurgitation may have advanced from mild to moderate to at least moderate without significant LV dilation. Ms. Donavan Montenegro is here today for follow up today. She reports doing ok.  She does feel like she is on to many medications at

## 2023-04-26 NOTE — PATIENT INSTRUCTIONS
SURVEY:    You may be receiving a survey from [employer] regarding your visit today. Please complete the survey to enable us to provide the highest quality of care to you and your family. If you cannot score us a very good on any question, please call the office to discuss how we could have made your experience a very good one. Thank you.

## 2023-04-27 ENCOUNTER — TELEPHONE (OUTPATIENT)
Dept: CARDIOLOGY | Age: 73
End: 2023-04-27

## 2023-04-27 NOTE — TELEPHONE ENCOUNTER
----- Message from Valencia Self MD sent at 4/26/2023 10:08 PM EDT -----  Let Patient know bloodwork s a bit worse including CBC. Unclear how significant. Follow up with ESTELLE Perry - CNP. Thanks. Thanks.

## 2023-05-24 ENCOUNTER — OFFICE VISIT (OUTPATIENT)
Dept: ONCOLOGY | Age: 73
End: 2023-05-24
Payer: MEDICARE

## 2023-05-24 VITALS
RESPIRATION RATE: 18 BRPM | SYSTOLIC BLOOD PRESSURE: 126 MMHG | TEMPERATURE: 97.5 F | HEIGHT: 63 IN | HEART RATE: 85 BPM | DIASTOLIC BLOOD PRESSURE: 74 MMHG | WEIGHT: 125 LBS | BODY MASS INDEX: 22.15 KG/M2

## 2023-05-24 DIAGNOSIS — R79.89 ABNORMAL CBC: Primary | ICD-10-CM

## 2023-05-24 PROCEDURE — G8420 CALC BMI NORM PARAMETERS: HCPCS | Performed by: INTERNAL MEDICINE

## 2023-05-24 PROCEDURE — 99204 OFFICE O/P NEW MOD 45 MIN: CPT | Performed by: INTERNAL MEDICINE

## 2023-05-24 PROCEDURE — 1090F PRES/ABSN URINE INCON ASSESS: CPT | Performed by: INTERNAL MEDICINE

## 2023-05-24 PROCEDURE — G8427 DOCREV CUR MEDS BY ELIG CLIN: HCPCS | Performed by: INTERNAL MEDICINE

## 2023-05-24 PROCEDURE — 3078F DIAST BP <80 MM HG: CPT | Performed by: INTERNAL MEDICINE

## 2023-05-24 PROCEDURE — 99211 OFF/OP EST MAY X REQ PHY/QHP: CPT | Performed by: INTERNAL MEDICINE

## 2023-05-24 PROCEDURE — 3074F SYST BP LT 130 MM HG: CPT | Performed by: INTERNAL MEDICINE

## 2023-05-24 NOTE — PROGRESS NOTES
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 female who is referred to us for unexplained macrocytosis. Patient underwent lab testing as part of general checkup. It showed that she has increased MCV and increased MCH. These are new changes since last year. The patient is relatively asymptomatic otherwise and she is feeling well. Looking at her history, she has history of rheumatoid arthritis that flared up last year. She developed severe serositis and was started on methotrexate and then received Humira and then changed to Remicade. We will try to obtain notes from the rheumatologist as it is very unusual for patients to have rheumatoid arthritis starting at age 70. And it is very unusual to use both combination of Remicade and methotrexate. The patient does not have any active serositis or deformities from rheumatoid. She is sent to us for a consultation, she is very anxious but overall feels well. PAST MEDICAL HISTORY: has a past medical history of Arthritis, CAD (coronary artery disease), COPD (chronic obstructive pulmonary disease) ([address] H/O cardiovascular stress test, H/O cardiovascular stress test, H/O echocardiogram, History of echocardiogram, Hx of cardiac catheterization, Hx of heart artery stent, Hx of percutaneous left heart catheterization, Hx of percutaneous left heart catheterization, Hx of percutaneous left heart catheterization, Hyperlipidemia, Idiopathic peripheral neuropathy, Normal nuclear stress test, Rheumatoid arthritis involving multiple sites with positive rheumatoid factor ([address] and Vitamin D insufficiency. PAST SURGICAL HISTORY: has a past surgical history that includes Carpal tunnel release; Neck surgery; Coronary angioplasty with stent; Cardiac catheterization (Left, 08/09/2018); and Finger trigger release (Right, 12/2/2022).

## 2023-06-28 ENCOUNTER — OFFICE VISIT (OUTPATIENT)
Dept: ONCOLOGY | Age: 73
End: 2023-06-28
Payer: MEDICARE

## 2023-06-28 VITALS
BODY MASS INDEX: 22.14 KG/M2 | SYSTOLIC BLOOD PRESSURE: 110 MMHG | TEMPERATURE: 98.1 F | HEART RATE: 79 BPM | WEIGHT: 125 LBS | DIASTOLIC BLOOD PRESSURE: 63 MMHG | RESPIRATION RATE: 18 BRPM

## 2023-06-28 DIAGNOSIS — R79.89 ABNORMAL CBC: Primary | ICD-10-CM

## 2023-06-28 DIAGNOSIS — D75.89 MACROCYTOSIS: ICD-10-CM

## 2023-06-28 PROCEDURE — 3017F COLORECTAL CA SCREEN DOC REV: CPT | Performed by: INTERNAL MEDICINE

## 2023-06-28 PROCEDURE — 1090F PRES/ABSN URINE INCON ASSESS: CPT | Performed by: INTERNAL MEDICINE

## 2023-06-28 PROCEDURE — G8420 CALC BMI NORM PARAMETERS: HCPCS | Performed by: INTERNAL MEDICINE

## 2023-06-28 PROCEDURE — G8427 DOCREV CUR MEDS BY ELIG CLIN: HCPCS | Performed by: INTERNAL MEDICINE

## 2023-06-28 PROCEDURE — 3074F SYST BP LT 130 MM HG: CPT | Performed by: INTERNAL MEDICINE

## 2023-06-28 PROCEDURE — 1036F TOBACCO NON-USER: CPT | Performed by: INTERNAL MEDICINE

## 2023-06-28 PROCEDURE — 1123F ACP DISCUSS/DSCN MKR DOCD: CPT | Performed by: INTERNAL MEDICINE

## 2023-06-28 PROCEDURE — 99214 OFFICE O/P EST MOD 30 MIN: CPT | Performed by: INTERNAL MEDICINE

## 2023-06-28 PROCEDURE — 3078F DIAST BP <80 MM HG: CPT | Performed by: INTERNAL MEDICINE

## 2023-06-28 PROCEDURE — G8399 PT W/DXA RESULTS DOCUMENT: HCPCS | Performed by: INTERNAL MEDICINE

## 2023-06-28 RX ORDER — TRIAMCINOLONE ACETONIDE 1 MG/G
CREAM TOPICAL
COMMUNITY
Start: 2023-06-23

## 2023-07-24 ENCOUNTER — APPOINTMENT (OUTPATIENT)
Dept: CT IMAGING | Age: 73
End: 2023-07-24
Attending: EMERGENCY MEDICINE
Payer: MEDICARE

## 2023-07-24 ENCOUNTER — HOSPITAL ENCOUNTER (EMERGENCY)
Age: 73
Discharge: HOME | End: 2023-07-24
Attending: EMERGENCY MEDICINE
Payer: MEDICARE

## 2023-07-24 ENCOUNTER — APPOINTMENT (OUTPATIENT)
Dept: GENERAL RADIOLOGY | Age: 73
End: 2023-07-24
Payer: MEDICARE

## 2023-07-24 VITALS
WEIGHT: 128 LBS | OXYGEN SATURATION: 96 % | TEMPERATURE: 98.1 F | RESPIRATION RATE: 18 BRPM | DIASTOLIC BLOOD PRESSURE: 88 MMHG | SYSTOLIC BLOOD PRESSURE: 145 MMHG | HEART RATE: 80 BPM | BODY MASS INDEX: 22.67 KG/M2

## 2023-07-24 DIAGNOSIS — R07.81 PLEURITIC CHEST PAIN: ICD-10-CM

## 2023-07-24 DIAGNOSIS — R07.89 ATYPICAL CHEST PAIN: Primary | ICD-10-CM

## 2023-07-24 LAB
ALBUMIN SERPL-MCNC: 4.2 G/DL (ref 3.5–5.2)
ALBUMIN/GLOB SERPL: 1.3 {RATIO} (ref 1–2.5)
ALP SERPL-CCNC: 78 U/L (ref 35–104)
ALT SERPL-CCNC: 20 U/L (ref 5–33)
ANION GAP SERPL CALCULATED.3IONS-SCNC: 9 MMOL/L (ref 9–17)
AST SERPL-CCNC: 20 U/L
BASOPHILS # BLD: 0.03 K/UL (ref 0–0.2)
BASOPHILS NFR BLD: 1 % (ref 0–2)
BILIRUB SERPL-MCNC: 0.4 MG/DL (ref 0.3–1.2)
BUN SERPL-MCNC: 10 MG/DL (ref 8–23)
BUN/CREAT SERPL: 17 (ref 9–20)
CALCIUM SERPL-MCNC: 9.3 MG/DL (ref 8.6–10.4)
CHLORIDE SERPL-SCNC: 104 MMOL/L (ref 98–107)
CO2 SERPL-SCNC: 27 MMOL/L (ref 20–31)
CREAT SERPL-MCNC: 0.6 MG/DL (ref 0.5–0.9)
D DIMER PPP FEU-MCNC: 0.4 UG/ML FEU (ref 0–0.59)
EOSINOPHIL # BLD: 0.13 K/UL (ref 0–0.44)
EOSINOPHILS RELATIVE PERCENT: 2 % (ref 1–4)
ERYTHROCYTE [DISTWIDTH] IN BLOOD BY AUTOMATED COUNT: 12.9 % (ref 11.8–14.4)
GFR SERPL CREATININE-BSD FRML MDRD: >60 ML/MIN/1.73M2
GLUCOSE SERPL-MCNC: 107 MG/DL (ref 70–99)
HCT VFR BLD AUTO: 37.6 % (ref 36.3–47.1)
HGB BLD-MCNC: 12.7 G/DL (ref 11.9–15.1)
IMM GRANULOCYTES # BLD AUTO: <0.03 K/UL (ref 0–0.3)
IMM GRANULOCYTES NFR BLD: 0 %
LYMPHOCYTES NFR BLD: 1.66 K/UL (ref 1.1–3.7)
LYMPHOCYTES RELATIVE PERCENT: 28 % (ref 24–43)
MCH RBC QN AUTO: 35.2 PG (ref 25.2–33.5)
MCHC RBC AUTO-ENTMCNC: 33.8 G/DL (ref 28.4–34.8)
MCV RBC AUTO: 104.2 FL (ref 82.6–102.9)
MONOCYTES NFR BLD: 0.62 K/UL (ref 0.1–1.2)
MONOCYTES NFR BLD: 11 % (ref 3–12)
NEUTROPHILS NFR BLD: 58 % (ref 36–65)
NEUTS SEG NFR BLD: 3.42 K/UL (ref 1.5–8.1)
NRBC BLD-RTO: 0 PER 100 WBC
PLATELET # BLD AUTO: 241 K/UL (ref 138–453)
PMV BLD AUTO: 8.8 FL (ref 8.1–13.5)
POTASSIUM SERPL-SCNC: 4 MMOL/L (ref 3.7–5.3)
PROT SERPL-MCNC: 7.5 G/DL (ref 6.4–8.3)
RBC # BLD AUTO: 3.61 M/UL (ref 3.95–5.11)
SODIUM SERPL-SCNC: 140 MMOL/L (ref 135–144)
TROPONIN I SERPL HS-MCNC: 7 NG/L (ref 0–14)
TROPONIN I SERPL HS-MCNC: <6 NG/L (ref 0–14)
WBC OTHER # BLD: 5.9 K/UL (ref 3.5–11.3)

## 2023-07-24 PROCEDURE — 99285 EMERGENCY DEPT VISIT HI MDM: CPT

## 2023-07-24 PROCEDURE — 96375 TX/PRO/DX INJ NEW DRUG ADDON: CPT

## 2023-07-24 PROCEDURE — 6360000002 HC RX W HCPCS: Performed by: EMERGENCY MEDICINE

## 2023-07-24 PROCEDURE — 36415 COLL VENOUS BLD VENIPUNCTURE: CPT

## 2023-07-24 PROCEDURE — 71045 X-RAY EXAM CHEST 1 VIEW: CPT

## 2023-07-24 PROCEDURE — 85027 COMPLETE CBC AUTOMATED: CPT

## 2023-07-24 PROCEDURE — 96376 TX/PRO/DX INJ SAME DRUG ADON: CPT

## 2023-07-24 PROCEDURE — 93005 ELECTROCARDIOGRAM TRACING: CPT | Performed by: EMERGENCY MEDICINE

## 2023-07-24 PROCEDURE — 84484 ASSAY OF TROPONIN QUANT: CPT

## 2023-07-24 PROCEDURE — 71250 CT THORAX DX C-: CPT

## 2023-07-24 PROCEDURE — 80053 COMPREHEN METABOLIC PANEL: CPT

## 2023-07-24 PROCEDURE — 96374 THER/PROPH/DIAG INJ IV PUSH: CPT

## 2023-07-24 PROCEDURE — 85379 FIBRIN DEGRADATION QUANT: CPT

## 2023-07-24 RX ORDER — MORPHINE SULFATE 4 MG/ML
4 INJECTION, SOLUTION INTRAMUSCULAR; INTRAVENOUS ONCE
Status: COMPLETED | OUTPATIENT
Start: 2023-07-24 | End: 2023-07-24

## 2023-07-24 RX ORDER — FENTANYL CITRATE 50 UG/ML
25 INJECTION, SOLUTION INTRAMUSCULAR; INTRAVENOUS ONCE
Status: COMPLETED | OUTPATIENT
Start: 2023-07-24 | End: 2023-07-24

## 2023-07-24 RX ORDER — ONDANSETRON 2 MG/ML
4 INJECTION INTRAMUSCULAR; INTRAVENOUS ONCE
Status: COMPLETED | OUTPATIENT
Start: 2023-07-24 | End: 2023-07-24

## 2023-07-24 RX ORDER — KETOROLAC TROMETHAMINE 15 MG/ML
15 INJECTION, SOLUTION INTRAMUSCULAR; INTRAVENOUS ONCE
Status: COMPLETED | OUTPATIENT
Start: 2023-07-24 | End: 2023-07-24

## 2023-07-24 RX ORDER — HYDROCODONE BITARTRATE AND ACETAMINOPHEN 5; 325 MG/1; MG/1
1 TABLET ORAL EVERY 6 HOURS PRN
Qty: 20 TABLET | Refills: 0 | Status: SHIPPED | OUTPATIENT
Start: 2023-07-24 | End: 2023-07-29

## 2023-07-24 RX ORDER — IBUPROFEN 400 MG/1
400 TABLET ORAL EVERY 6 HOURS PRN
Qty: 120 TABLET | Refills: 0 | Status: SHIPPED | OUTPATIENT
Start: 2023-07-24

## 2023-07-24 RX ORDER — PREDNISONE 50 MG/1
TABLET ORAL
Qty: 5 TABLET | Refills: 0 | Status: SHIPPED | OUTPATIENT
Start: 2023-07-24

## 2023-07-24 RX ADMIN — ONDANSETRON 4 MG: 2 INJECTION INTRAMUSCULAR; INTRAVENOUS at 19:55

## 2023-07-24 RX ADMIN — KETOROLAC TROMETHAMINE 15 MG: 15 INJECTION, SOLUTION INTRAMUSCULAR; INTRAVENOUS at 21:01

## 2023-07-24 RX ADMIN — MORPHINE SULFATE 4 MG: 4 INJECTION, SOLUTION INTRAMUSCULAR; INTRAVENOUS at 19:54

## 2023-07-24 RX ADMIN — ONDANSETRON 4 MG: 2 INJECTION INTRAMUSCULAR; INTRAVENOUS at 19:05

## 2023-07-24 RX ADMIN — FENTANYL CITRATE 25 MCG: 50 INJECTION, SOLUTION INTRAMUSCULAR; INTRAVENOUS at 19:05

## 2023-07-24 ASSESSMENT — ENCOUNTER SYMPTOMS
SHORTNESS OF BREATH: 0
ABDOMINAL DISTENTION: 0
BACK PAIN: 1
SORE THROAT: 0

## 2023-07-24 ASSESSMENT — PAIN DESCRIPTION - LOCATION
LOCATION: BACK
LOCATION: BACK;CHEST
LOCATION: BACK;CHEST
LOCATION: BACK;HEAD
LOCATION: CHEST

## 2023-07-24 ASSESSMENT — PAIN SCALES - GENERAL
PAINLEVEL_OUTOF10: 4
PAINLEVEL_OUTOF10: 8
PAINLEVEL_OUTOF10: 3
PAINLEVEL_OUTOF10: 4
PAINLEVEL_OUTOF10: 4
PAINLEVEL_OUTOF10: 3

## 2023-07-24 ASSESSMENT — PAIN DESCRIPTION - ORIENTATION: ORIENTATION: LEFT

## 2023-07-24 ASSESSMENT — PAIN - FUNCTIONAL ASSESSMENT: PAIN_FUNCTIONAL_ASSESSMENT: 0-10

## 2023-07-24 ASSESSMENT — PAIN DESCRIPTION - DESCRIPTORS: DESCRIPTORS: SHARP

## 2023-07-24 ASSESSMENT — HEART SCORE: ECG: 1

## 2023-07-24 NOTE — DISCHARGE INSTRUCTIONS
Recommend conservative management at this time with Tylenol or ice pack. Follow-up with your doctor later this week if you continue to have symptoms.

## 2023-07-24 NOTE — ED PROVIDER NOTES
[address] [facility] ED  EMERGENCY DEPARTMENT ENCOUNTER      Pt Name: Yadira Rincon  MRN: [id number]  Birthdate 1950  Date of evaluation: 7/24/2023  Provider: Avel Holloway       Chief Complaint   Patient presents with    Chest Pain     Ongoing 24 hours, left sided into back, SOB with pain at times, took 2 NTG today and 3 yesterday without relief         HISTORY OF PRESENT ILLNESS   (Location/Symptom, Timing/Onset, Context/Setting, Quality, Duration, Modifying Factors, Severity)  Note limiting factors. Yadira Rincon is a 67 y.o. female who presents to the emergency department with complains of a chest pain ongoing for the past 24 hours. Patient states that the pain is into her left side posteriorly and sometimes goes underneath her left breast.  He states that laying on that side usually triggers the pain. She states that any kind of movement makes it worse. She denies any shortness of breath. She denies any trauma to her back or to her chest.  She took 2 nitroglycerin today and 3 yesterday without any relief. Patient does have a history of 4 stents in the past.  She states that currently her pain is very mild. She denies any other concerns at this time. REVIEW OF SYSTEMS    (2-9 systems for level 4, 10 or more for level 5)     Review of Systems   Constitutional:  Negative for fatigue and fever. HENT:  Negative for congestion and sore throat. Eyes:  Negative for visual disturbance. Respiratory:  Negative for shortness of breath. Cardiovascular:  Positive for chest pain. Negative for palpitations. Gastrointestinal:  Negative for abdominal distention. Genitourinary:  Negative for dysuria. Musculoskeletal:  Positive for back pain. Skin:  Negative for rash. Psychiatric/Behavioral:  Negative for suicidal ideas. Except as noted above the remainder of the review of systems was reviewed and negative.        PAST MEDICAL HISTORY     Past Medical

## 2023-07-25 LAB
EKG ATRIAL RATE: 85 BPM
EKG P AXIS: 72 DEGREES
EKG P-R INTERVAL: 152 MS
EKG Q-T INTERVAL: 388 MS
EKG QRS DURATION: 84 MS
EKG QTC CALCULATION (BAZETT): 461 MS
EKG R AXIS: 9 DEGREES
EKG T AXIS: 52 DEGREES
EKG VENTRICULAR RATE: 85 BPM

## 2023-07-25 PROCEDURE — 93010 ELECTROCARDIOGRAM REPORT: CPT | Performed by: INTERNAL MEDICINE

## 2023-10-31 ENCOUNTER — HOSPITAL ENCOUNTER (EMERGENCY)
Age: 73
Discharge: HOME | End: 2023-10-31
Payer: MEDICARE

## 2023-10-31 VITALS
WEIGHT: 129 LBS | SYSTOLIC BLOOD PRESSURE: 169 MMHG | BODY MASS INDEX: 22.86 KG/M2 | HEART RATE: 90 BPM | TEMPERATURE: 98.1 F | OXYGEN SATURATION: 96 % | HEIGHT: 63 IN | RESPIRATION RATE: 18 BRPM | DIASTOLIC BLOOD PRESSURE: 73 MMHG
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
## 2023-10-31 DIAGNOSIS — S81.811A SKIN TEAR OF LOWER LEG WITHOUT COMPLICATION, RIGHT, INITIAL ENCOUNTER: Primary | ICD-10-CM

## 2023-10-31 PROCEDURE — 99282 EMERGENCY DEPT VISIT SF MDM: CPT

## 2023-10-31 PROCEDURE — 12004 RPR S/N/AX/GEN/TRK7.6-12.5CM: CPT

## 2023-10-31 ASSESSMENT — PAIN - FUNCTIONAL ASSESSMENT: PAIN_FUNCTIONAL_ASSESSMENT: 0-10

## 2023-10-31 ASSESSMENT — PAIN DESCRIPTION - DESCRIPTORS: DESCRIPTORS: BURNING

## 2023-10-31 ASSESSMENT — ENCOUNTER SYMPTOMS
SHORTNESS OF BREATH: 0
COUGH: 0

## 2023-10-31 ASSESSMENT — PAIN DESCRIPTION - PAIN TYPE: TYPE: ACUTE PAIN

## 2023-10-31 ASSESSMENT — PAIN SCALES - GENERAL: PAINLEVEL_OUTOF10: 4

## 2023-10-31 ASSESSMENT — PAIN DESCRIPTION - LOCATION: LOCATION: ARM;LEG

## 2023-10-31 NOTE — ED PROVIDER NOTES
[address] [facility]      Pt Name: Franco Dooley  MRN: [id number]  SBIVCGZBI 1950  Date of evaluation: 10/31/2023  Provider: Maki Davila PA-C    CHIEF COMPLAINT       Chief Complaint   Patient presents with    Laceration     Patient tripped going up a step landing on arms and cutting right shin, 10-15 mins PTA. Laceration to left forearm and right shin. HISTORY OF PRESENT ILLNESS      rFanco Dooley is a 68 y.o. female who presents to the emergency department due to fall on steps. Prior to arrival to the emergency department the patient tripped going up a step. She landed on her forearms however she struck her right shin on the metal bar of the steps and sustained a large skin tear. Bleeding is controlled. Pain is mild to moderate. Patient has had a tetanus shot in the last 2 years. There are no other complaints or concerns. REVIEW OF SYSTEMS       Review of Systems   Constitutional:  Negative for fever. Respiratory:  Negative for cough and shortness of breath. Cardiovascular:  Negative for chest pain. Neurological:  Negative for syncope and headaches. PAST MEDICAL HISTORY     Past Medical History:   Diagnosis Date    Arthritis     CAD (coronary artery disease) 4/11, 9/12    4 total stents. 2 Stents in RCA, 1 in small OM1.    COPD (chronic obstructive pulmonary disease) ([facility])     MHT pulmonary    H/O cardiovascular stress test 5/22/13, 6/25/14    Relatively normal without evidence of significant ischemia or infarction. global LV systolic function was normal w/o regional wall motion abnormalities. No significant EKG evidence of ischemia during monitoring w/o significant associated arrhythmias. Tay score is 5. H/O cardiovascular stress test 12/28/2017    Larglely normal myocardial perfusion imaging with soft tissue artifact but w/o evidence of significant myocardial ischemia or infarction.  results are most consistent with

## 2023-11-06 ENCOUNTER — HOSPITAL ENCOUNTER (OUTPATIENT)
Age: 73
Setting detail: SPECIMEN
Discharge: HOME | End: 2023-11-06

## 2023-11-06 DIAGNOSIS — L03.115 CELLULITIS OF RIGHT LOWER EXTREMITY: ICD-10-CM

## 2023-11-08 LAB
MICROORGANISM SPEC CULT: ABNORMAL
MICROORGANISM SPEC CULT: ABNORMAL
MICROORGANISM/AGENT SPEC: ABNORMAL
MICROORGANISM/AGENT SPEC: ABNORMAL
SPECIMEN DESCRIPTION: ABNORMAL

## 2023-11-08 NOTE — RESULT ENCOUNTER NOTE
Please call pt and inform them their labwork results show pseudomonas growth.   Stop bactrim and start levaquin 500mg po daily x10 days

## 2023-11-09 DIAGNOSIS — J44.9 STAGE 2 MODERATE COPD BY GOLD CLASSIFICATION (HCC): ICD-10-CM

## 2023-11-09 RX ORDER — FLUTICASONE PROPIONATE AND SALMETEROL 113; 14 UG/1; UG/1
1 POWDER, METERED RESPIRATORY (INHALATION) 2 TIMES DAILY
Qty: 1 EACH | Refills: 3 | Status: SHIPPED | OUTPATIENT
Start: 2023-11-09 | End: 2023-12-09

## 2023-11-09 NOTE — TELEPHONE ENCOUNTER
Amina Hernandez called in and left a voicemail requesting a refill on her Fluticasone inhaler. She would like a 3 month supply sent to [address] [place] [address]. Next office visit is 1/11/24. Please advise.

## 2023-11-10 ENCOUNTER — HOSPITAL ENCOUNTER (OUTPATIENT)
Age: 73
End: 2023-11-10
Payer: MEDICARE

## 2023-11-10 ENCOUNTER — HOSPITAL ENCOUNTER (OUTPATIENT)
Dept: GENERAL RADIOLOGY | Age: 73
End: 2023-11-10
Payer: MEDICARE

## 2023-11-10 DIAGNOSIS — L03.115 CELLULITIS OF RIGHT LOWER EXTREMITY: ICD-10-CM

## 2023-11-10 DIAGNOSIS — M79.604 PAIN OF RIGHT LOWER EXTREMITY: ICD-10-CM

## 2023-11-10 PROCEDURE — 73590 X-RAY EXAM OF LOWER LEG: CPT

## 2023-11-17 ENCOUNTER — HOSPITAL ENCOUNTER (INPATIENT)
Age: 73
LOS: 1 days | Discharge: HOME | DRG: 603 | End: 2023-11-18
Attending: INTERNAL MEDICINE | Admitting: INTERNAL MEDICINE
Payer: MEDICARE

## 2023-11-17 DIAGNOSIS — L03.115 CELLULITIS OF RIGHT LEG: Primary | ICD-10-CM

## 2023-11-17 LAB — LACTATE BLDV-SCNC: 1.4 MMOL/L (ref 0.5–2.2)

## 2023-11-17 PROCEDURE — 36415 COLL VENOUS BLD VENIPUNCTURE: CPT

## 2023-11-17 PROCEDURE — 94664 DEMO&/EVAL PT USE INHALER: CPT

## 2023-11-17 PROCEDURE — 6360000002 HC RX W HCPCS: Performed by: INTERNAL MEDICINE

## 2023-11-17 PROCEDURE — 2580000003 HC RX 258: Performed by: INTERNAL MEDICINE

## 2023-11-17 PROCEDURE — 87040 BLOOD CULTURE FOR BACTERIA: CPT

## 2023-11-17 PROCEDURE — 83605 ASSAY OF LACTIC ACID: CPT

## 2023-11-17 PROCEDURE — 1200000000 HC SEMI PRIVATE

## 2023-11-17 PROCEDURE — 94761 N-INVAS EAR/PLS OXIMETRY MLT: CPT

## 2023-11-17 PROCEDURE — 6370000000 HC RX 637 (ALT 250 FOR IP): Performed by: INTERNAL MEDICINE

## 2023-11-17 RX ORDER — ACETAMINOPHEN 650 MG/1
650 SUPPOSITORY RECTAL EVERY 6 HOURS PRN
Status: DISCONTINUED | OUTPATIENT
Start: 2023-11-17 | End: 2023-11-18 | Stop reason: HOSPADM

## 2023-11-17 RX ORDER — ACETAMINOPHEN 325 MG/1
650 TABLET ORAL EVERY 6 HOURS PRN
Status: DISCONTINUED | OUTPATIENT
Start: 2023-11-17 | End: 2023-11-18 | Stop reason: HOSPADM

## 2023-11-17 RX ORDER — SODIUM CHLORIDE 9 MG/ML
INJECTION, SOLUTION INTRAVENOUS CONTINUOUS
Status: DISCONTINUED | OUTPATIENT
Start: 2023-11-17 | End: 2023-11-18 | Stop reason: HOSPADM

## 2023-11-17 RX ORDER — GABAPENTIN 300 MG/1
300 CAPSULE ORAL DAILY
Status: DISCONTINUED | OUTPATIENT
Start: 2023-11-17 | End: 2023-11-18 | Stop reason: HOSPADM

## 2023-11-17 RX ORDER — ONDANSETRON 4 MG/1
4 TABLET, ORALLY DISINTEGRATING ORAL EVERY 8 HOURS PRN
Status: DISCONTINUED | OUTPATIENT
Start: 2023-11-17 | End: 2023-11-18 | Stop reason: HOSPADM

## 2023-11-17 RX ORDER — POLYETHYLENE GLYCOL 3350 17 G/17G
17 POWDER, FOR SOLUTION ORAL DAILY PRN
Status: DISCONTINUED | OUTPATIENT
Start: 2023-11-17 | End: 2023-11-18 | Stop reason: HOSPADM

## 2023-11-17 RX ORDER — SODIUM CHLORIDE 9 MG/ML
INJECTION, SOLUTION INTRAVENOUS PRN
Status: DISCONTINUED | OUTPATIENT
Start: 2023-11-17 | End: 2023-11-18 | Stop reason: HOSPADM

## 2023-11-17 RX ORDER — TIMOLOL MALEATE 5 MG/ML
1 SOLUTION/ DROPS OPHTHALMIC 2 TIMES DAILY
Status: DISCONTINUED | OUTPATIENT
Start: 2023-11-17 | End: 2023-11-18 | Stop reason: HOSPADM

## 2023-11-17 RX ORDER — POTASSIUM CHLORIDE 7.45 MG/ML
10 INJECTION INTRAVENOUS PRN
Status: DISCONTINUED | OUTPATIENT
Start: 2023-11-17 | End: 2023-11-18 | Stop reason: HOSPADM

## 2023-11-17 RX ORDER — PANTOPRAZOLE SODIUM 40 MG/1
40 TABLET, DELAYED RELEASE ORAL DAILY
Status: DISCONTINUED | OUTPATIENT
Start: 2023-11-17 | End: 2023-11-18 | Stop reason: HOSPADM

## 2023-11-17 RX ORDER — LATANOPROST 50 UG/ML
1 SOLUTION/ DROPS OPHTHALMIC DAILY
Status: DISCONTINUED | OUTPATIENT
Start: 2023-11-17 | End: 2023-11-18 | Stop reason: HOSPADM

## 2023-11-17 RX ORDER — SODIUM CHLORIDE 0.9 % (FLUSH) 0.9 %
5-40 SYRINGE (ML) INJECTION EVERY 12 HOURS SCHEDULED
Status: DISCONTINUED | OUTPATIENT
Start: 2023-11-17 | End: 2023-11-18 | Stop reason: HOSPADM

## 2023-11-17 RX ORDER — MAGNESIUM SULFATE IN WATER 40 MG/ML
2000 INJECTION, SOLUTION INTRAVENOUS PRN
Status: DISCONTINUED | OUTPATIENT
Start: 2023-11-17 | End: 2023-11-18 | Stop reason: HOSPADM

## 2023-11-17 RX ORDER — FAMCICLOVIR 250 MG/1
250 TABLET ORAL DAILY
Status: DISCONTINUED | OUTPATIENT
Start: 2023-11-18 | End: 2023-11-18 | Stop reason: HOSPADM

## 2023-11-17 RX ORDER — ONDANSETRON 2 MG/ML
4 INJECTION INTRAMUSCULAR; INTRAVENOUS EVERY 6 HOURS PRN
Status: DISCONTINUED | OUTPATIENT
Start: 2023-11-17 | End: 2023-11-18 | Stop reason: HOSPADM

## 2023-11-17 RX ORDER — ROPINIROLE 0.25 MG/1
1 TABLET, FILM COATED ORAL EVERY EVENING
Status: DISCONTINUED | OUTPATIENT
Start: 2023-11-17 | End: 2023-11-18 | Stop reason: HOSPADM

## 2023-11-17 RX ORDER — SODIUM CHLORIDE 0.9 % (FLUSH) 0.9 %
10 SYRINGE (ML) INJECTION PRN
Status: DISCONTINUED | OUTPATIENT
Start: 2023-11-17 | End: 2023-11-18 | Stop reason: HOSPADM

## 2023-11-17 RX ORDER — LEVOFLOXACIN 5 MG/ML
500 INJECTION, SOLUTION INTRAVENOUS EVERY 24 HOURS
Status: DISCONTINUED | OUTPATIENT
Start: 2023-11-17 | End: 2023-11-18

## 2023-11-17 RX ORDER — POTASSIUM CHLORIDE 20 MEQ/1
40 TABLET, EXTENDED RELEASE ORAL PRN
Status: DISCONTINUED | OUTPATIENT
Start: 2023-11-17 | End: 2023-11-18 | Stop reason: HOSPADM

## 2023-11-17 RX ADMIN — ACETAMINOPHEN 650 MG: 325 TABLET ORAL at 20:27

## 2023-11-17 RX ADMIN — TIMOLOL MALEATE 1 DROP: 5 SOLUTION OPHTHALMIC at 21:22

## 2023-11-17 RX ADMIN — LATANOPROST 1 DROP: 50 SOLUTION/ DROPS OPHTHALMIC at 21:22

## 2023-11-17 RX ADMIN — PANTOPRAZOLE SODIUM 40 MG: 40 TABLET, DELAYED RELEASE ORAL at 21:21

## 2023-11-17 RX ADMIN — SODIUM CHLORIDE: 9 INJECTION, SOLUTION INTRAVENOUS at 12:48

## 2023-11-17 RX ADMIN — ROPINIROLE HYDROCHLORIDE 1 MG: 0.25 TABLET, FILM COATED ORAL at 16:50

## 2023-11-17 RX ADMIN — GABAPENTIN 300 MG: 300 CAPSULE ORAL at 21:22

## 2023-11-17 RX ADMIN — LEVOFLOXACIN 500 MG: 5 INJECTION, SOLUTION INTRAVENOUS at 13:37

## 2023-11-17 ASSESSMENT — PAIN DESCRIPTION - ORIENTATION: ORIENTATION: RIGHT;LOWER

## 2023-11-17 ASSESSMENT — PAIN DESCRIPTION - PAIN TYPE: TYPE: ACUTE PAIN

## 2023-11-17 ASSESSMENT — PAIN SCALES - GENERAL: PAINLEVEL_OUTOF10: 3

## 2023-11-17 ASSESSMENT — PAIN DESCRIPTION - DESCRIPTORS: DESCRIPTORS: ACHING

## 2023-11-17 ASSESSMENT — PAIN DESCRIPTION - FREQUENCY: FREQUENCY: INTERMITTENT

## 2023-11-17 ASSESSMENT — PAIN DESCRIPTION - LOCATION: LOCATION: LEG

## 2023-11-17 ASSESSMENT — PAIN - FUNCTIONAL ASSESSMENT: PAIN_FUNCTIONAL_ASSESSMENT: PREVENTS OR INTERFERES SOME ACTIVE ACTIVITIES AND ADLS

## 2023-11-17 ASSESSMENT — PAIN DESCRIPTION - ONSET: ONSET: PROGRESSIVE

## 2023-11-17 NOTE — PROGRESS NOTES
Patient admitted to the floor at this time. Patient located in room 326. Patient a direct admission from Dr. Elinaa Fajardo' office. Patient transferred to the bed at this time independently. Vital signs, weight, height, and head to toe assessment completed at this time, see flowsheets for more details. Patient denies of pain at this time. Admission navigator completed at this time. Patient is A&O x4. Patient oriented to the call light system at this time. White board completed. Call light and bedside table within reach. Bed wheels locked. Bed in lowest position.

## 2023-11-17 NOTE — PROGRESS NOTES
Comprehensive Nutrition Assessment    Type and Reason for Visit:  Initial    Nutrition Recommendations/Plan:   Modify diet to 2 gm sodium. Recommend addition of daily MVI with minerals for healing needs. Malnutrition Assessment:  Malnutrition Status:  No malnutrition (11/17/23 1415)    Context:  Acute Illness     Findings of the 6 clinical characteristics of malnutrition:  Energy Intake:  No significant decrease in energy intake  Weight Loss:  No significant weight loss     Body Fat Loss:  No significant body fat loss     Muscle Mass Loss:  No significant muscle mass loss    Fluid Accumulation:  Mild Extremities (RLE non pitting)   Strength:  Not Performed    Nutrition Assessment:    Increased nutrient needs r/t acute injury/trauma aeb healing needs from wound. Pt encouraged to eat protein foods and take MVI with minerals. Takes vitamin D at home (vitamin D 26.0. \"tries to\" follow a low sodium diet at home. Stable weight and good PO before admission. Nutrition Related Findings:    appears well nourished Wound Type:  (traumatic)       Current Nutrition Intake & Therapies:    Average Meal Intake: Unable to assess  Average Supplements Intake: None Ordered  Diet NPO  ADULT DIET; Regular    Anthropometric Measures:  Height: 160 cm (5' 3\")  Ideal Body Weight (IBW): 115 lbs (52 kg)    Admission Body Weight: 56.8 kg (125 lb 3.5 oz)  Current Body Weight: 56.8 kg (125 lb 3.5 oz), 108.9 % IBW.  Weight Source: Bed Scale  Current BMI (kg/m2): 22.2  Usual Body Weight: 56.7 kg (125 lb)  % Weight Change (Calculated): 0.2  Weight Adjustment For: No Adjustment                 BMI Categories: Normal Weight (BMI 22.0 to 24.9) age over 72    Estimated Daily Nutrient Needs:  Energy Requirements Based On: Kcal/kg  Weight Used for Energy Requirements: Current  Energy (kcal/day): 4894-6027 (25/28/kg)  Weight Used for Protein Requirements: Ideal  Protein (g/day): 62-73g (1.2-1.4g/kg)  Method Used for Fluid Requirements: 1

## 2023-11-17 NOTE — RT PROTOCOL NOTE
RESPIRATORY ASSESSMENT PROTOCOL                                                                                              Patient Name: Franco [facility] Room#: 9265/2167-23 : 1950     Admitting diagnosis: Cellulitis of right leg [F82.8]       Medical History:   Past Medical History:   Diagnosis Date    Arthritis     CAD (coronary artery disease) ,     4 total stents. 2 Stents in RCA, 1 in small OM1.    COPD (chronic obstructive pulmonary disease) ([facility])     MHT pulmonary    H/O cardiovascular stress test 13, 14    Relatively normal without evidence of significant ischemia or infarction. global LV systolic function was normal w/o regional wall motion abnormalities. No significant EKG evidence of ischemia during monitoring w/o significant associated arrhythmias. Tay score is 5. H/O cardiovascular stress test 2017    Larglely normal myocardial perfusion imaging with soft tissue artifact but w/o evidence of significant myocardial ischemia or infarction. results are most consistent with low/intermediate risk for significant CAD    H/O echocardiogram 13, 14    EF>55%, mild concentric LVH    History of echocardiogram 2018    EF of 60%. evidence of moderate diastolic dysfunction is seen.      Hx of cardiac catheterization 2018    MIld CAD w/o any significant focal stenosis with a normal LV end diastolic pressure LVEDP interestingly the pt developed pretty pronounced ST depression on Lt main engagment with mod chest pressure which resolved o disengagement of the LT main    Hx of heart artery stent 10/06/2011    Lesion of Prox RCA 75% stenosis-Drug Eluting stent to RCA    Hx of percutaneous left heart catheterization 2011    LMCA, LAD & RAMUS- normal, LCX-lesion on Mid CX 40% stenosis, RCA-Patent stent, EF 60%    Hx of percutaneous left heart catheterization 2012    Normal renals, Patent RCA stents with mid LCX stenosis IVUS suggest 60-70% stenosis [physician-ordered bronchodilator(s)] TID and Albuterol PRN q4 hrs. Notify physician if condition deteriorates. MDI THERAPY with  [physician-ordered bronchodilator(s)] via spacer TID PRN. If unable to utilize MDI: HHN [physician-ordered bronchodilator(s)] TID PRN. Notify physician if condition deteriorates. If Acuity Level is 2, 3, or 4 in any of the following:    [] COUGH     [] SURGICAL HISTORY (SURG HX)  [] CHEST XRAY (CXR)    Goal: Improvement in sputum mobilization in patients with ineffective airway clearance. Reverse atelectasis. [] Bronchopulmonary Hygiene Protocol      Total Acuity:   14-28  []  Secondary Assessment in 24 hrs Total Acuity:  9-13  []  Secondary Assessment in 24 hrs Total Acuity:  4-8  []  Secondary Assessment in 24 hrs Total Acuity:  0-3  []  Secondary Assessment in 48 hrs   METANEB QID with [physician-ordered bronchodilator(s)] if CXR Acuity = 4; otherwise:  PD&P, Oscillatory Therapy, or Vest QID & PRN AND PEP QID & PRN  NT Sxn PRN for ineffective cough  METANEB QID with [physician-ordered bronchodilator(s)] if CXR Acuity = 4; otherwise:  PD&P, Oscillatory Therapy or Vest QID & PRN AND PEP QID & PRN  NT Sxn PRN for ineffective cough  PD&P, Oscillatory Therapy, or Vest TID & PRN AND PEP TID & PRN   Instruct patient to self-perform IS q1hr WA       If Acuity Level is 2 or above in the following:    [] PULMONARY HISTORY (PULM HX)    Goal: Assist patient in quitting smoking to slow or stop the progression of lung disease.     [] Smoking Cessation Protocol    SMOKING CESSATION EDUCATION provided according to policy OQ_425: (braulio with an X)  ____Yes    ____ No     ____ NA    Smoking Cessation Booklet given:  ____Yes  ____No ____Patient Ann-Marie Martha

## 2023-11-18 ENCOUNTER — ANESTHESIA EVENT (OUTPATIENT)
Dept: OPERATING ROOM | Age: 73
End: 2023-11-18
Payer: MEDICARE

## 2023-11-18 ENCOUNTER — ANESTHESIA (OUTPATIENT)
Dept: OPERATING ROOM | Age: 73
End: 2023-11-18
Payer: MEDICARE

## 2023-11-18 VITALS
RESPIRATION RATE: 16 BRPM | DIASTOLIC BLOOD PRESSURE: 76 MMHG | HEIGHT: 63 IN | OXYGEN SATURATION: 98 % | SYSTOLIC BLOOD PRESSURE: 120 MMHG | BODY MASS INDEX: 23.28 KG/M2 | WEIGHT: 131.39 LBS | TEMPERATURE: 98 F | HEART RATE: 78 BPM

## 2023-11-18 LAB
ALBUMIN SERPL-MCNC: 3.4 G/DL (ref 3.5–5.2)
ALBUMIN/GLOB SERPL: 0.9 {RATIO} (ref 1–2.5)
ALP SERPL-CCNC: 88 U/L (ref 35–104)
ALT SERPL-CCNC: 15 U/L (ref 5–33)
ANION GAP SERPL CALCULATED.3IONS-SCNC: 10 MMOL/L (ref 9–17)
AST SERPL-CCNC: 19 U/L
BASOPHILS # BLD: 0.03 K/UL (ref 0–0.2)
BASOPHILS NFR BLD: 0 % (ref 0–2)
BILIRUB SERPL-MCNC: 0.2 MG/DL (ref 0.3–1.2)
BUN SERPL-MCNC: 10 MG/DL (ref 8–23)
BUN/CREAT SERPL: 20 (ref 9–20)
CALCIUM SERPL-MCNC: 9.3 MG/DL (ref 8.6–10.4)
CHLORIDE SERPL-SCNC: 107 MMOL/L (ref 98–107)
CO2 SERPL-SCNC: 25 MMOL/L (ref 20–31)
CREAT SERPL-MCNC: 0.5 MG/DL (ref 0.5–0.9)
CRP SERPL HS-MCNC: 27.1 MG/L (ref 0–5)
EOSINOPHIL # BLD: 0.16 K/UL (ref 0–0.44)
EOSINOPHILS RELATIVE PERCENT: 2 % (ref 1–4)
ERYTHROCYTE [DISTWIDTH] IN BLOOD BY AUTOMATED COUNT: 11.9 % (ref 11.8–14.4)
GFR SERPL CREATININE-BSD FRML MDRD: >60 ML/MIN/1.73M2
GLUCOSE SERPL-MCNC: 98 MG/DL (ref 70–99)
HCT VFR BLD AUTO: 38.5 % (ref 36.3–47.1)
HGB BLD-MCNC: 12.2 G/DL (ref 11.9–15.1)
IMM GRANULOCYTES # BLD AUTO: <0.03 K/UL (ref 0–0.3)
IMM GRANULOCYTES NFR BLD: 0 %
LYMPHOCYTES NFR BLD: 2.14 K/UL (ref 1.1–3.7)
LYMPHOCYTES RELATIVE PERCENT: 30 % (ref 24–43)
MCH RBC QN AUTO: 31.1 PG (ref 25.2–33.5)
MCHC RBC AUTO-ENTMCNC: 31.7 G/DL (ref 28.4–34.8)
MCV RBC AUTO: 98.2 FL (ref 82.6–102.9)
MONOCYTES NFR BLD: 0.98 K/UL (ref 0.1–1.2)
MONOCYTES NFR BLD: 14 % (ref 3–12)
NEUTROPHILS NFR BLD: 53 % (ref 36–65)
NEUTS SEG NFR BLD: 3.7 K/UL (ref 1.5–8.1)
NRBC BLD-RTO: 0 PER 100 WBC
PLATELET # BLD AUTO: ABNORMAL K/UL (ref 138–453)
PLATELET, FLUORESCENCE: 280 K/UL (ref 138–453)
PLATELETS.RETICULATED NFR BLD AUTO: 2.4 % (ref 1.1–10.3)
POTASSIUM SERPL-SCNC: 4.3 MMOL/L (ref 3.7–5.3)
PROT SERPL-MCNC: 7.2 G/DL (ref 6.4–8.3)
RBC # BLD AUTO: 3.92 M/UL (ref 3.95–5.11)
SODIUM SERPL-SCNC: 142 MMOL/L (ref 135–144)
WBC OTHER # BLD: 7 K/UL (ref 3.5–11.3)

## 2023-11-18 PROCEDURE — 3600000002 HC SURGERY LEVEL 2 BASE: Performed by: ORTHOPAEDIC SURGERY

## 2023-11-18 PROCEDURE — 6370000000 HC RX 637 (ALT 250 FOR IP): Performed by: NURSE ANESTHETIST, CERTIFIED REGISTERED

## 2023-11-18 PROCEDURE — 3700000001 HC ADD 15 MINUTES (ANESTHESIA): Performed by: ORTHOPAEDIC SURGERY

## 2023-11-18 PROCEDURE — 80053 COMPREHEN METABOLIC PANEL: CPT

## 2023-11-18 PROCEDURE — 7100000011 HC PHASE II RECOVERY - ADDTL 15 MIN: Performed by: ORTHOPAEDIC SURGERY

## 2023-11-18 PROCEDURE — 0HBKXZZ EXCISION OF RIGHT LOWER LEG SKIN, EXTERNAL APPROACH: ICD-10-PCS | Performed by: ORTHOPAEDIC SURGERY

## 2023-11-18 PROCEDURE — 85025 COMPLETE CBC W/AUTO DIFF WBC: CPT

## 2023-11-18 PROCEDURE — 94761 N-INVAS EAR/PLS OXIMETRY MLT: CPT

## 2023-11-18 PROCEDURE — 2709999900 HC NON-CHARGEABLE SUPPLY: Performed by: ORTHOPAEDIC SURGERY

## 2023-11-18 PROCEDURE — 86140 C-REACTIVE PROTEIN: CPT

## 2023-11-18 PROCEDURE — 6360000002 HC RX W HCPCS: Performed by: INTERNAL MEDICINE

## 2023-11-18 PROCEDURE — 36415 COLL VENOUS BLD VENIPUNCTURE: CPT

## 2023-11-18 PROCEDURE — 2500000003 HC RX 250 WO HCPCS: Performed by: NURSE ANESTHETIST, CERTIFIED REGISTERED

## 2023-11-18 PROCEDURE — 7100000010 HC PHASE II RECOVERY - FIRST 15 MIN: Performed by: ORTHOPAEDIC SURGERY

## 2023-11-18 PROCEDURE — 3600000012 HC SURGERY LEVEL 2 ADDTL 15MIN: Performed by: ORTHOPAEDIC SURGERY

## 2023-11-18 PROCEDURE — 6370000000 HC RX 637 (ALT 250 FOR IP): Performed by: INTERNAL MEDICINE

## 2023-11-18 PROCEDURE — 2580000003 HC RX 258: Performed by: INTERNAL MEDICINE

## 2023-11-18 PROCEDURE — 2500000003 HC RX 250 WO HCPCS: Performed by: ORTHOPAEDIC SURGERY

## 2023-11-18 PROCEDURE — 6360000002 HC RX W HCPCS: Performed by: NURSE ANESTHETIST, CERTIFIED REGISTERED

## 2023-11-18 PROCEDURE — 3700000000 HC ANESTHESIA ATTENDED CARE: Performed by: ORTHOPAEDIC SURGERY

## 2023-11-18 RX ORDER — PROPOFOL 10 MG/ML
INJECTION, EMULSION INTRAVENOUS PRN
Status: DISCONTINUED | OUTPATIENT
Start: 2023-11-18 | End: 2023-11-18 | Stop reason: SDUPTHER

## 2023-11-18 RX ORDER — SODIUM CHLORIDE 0.9 % (FLUSH) 0.9 %
5-40 SYRINGE (ML) INJECTION PRN
Status: DISCONTINUED | OUTPATIENT
Start: 2023-11-18 | End: 2023-11-18 | Stop reason: HOSPADM

## 2023-11-18 RX ORDER — LIDOCAINE HYDROCHLORIDE 20 MG/ML
INJECTION, SOLUTION EPIDURAL; INFILTRATION; INTRACAUDAL; PERINEURAL PRN
Status: DISCONTINUED | OUTPATIENT
Start: 2023-11-18 | End: 2023-11-18 | Stop reason: SDUPTHER

## 2023-11-18 RX ORDER — LEVOFLOXACIN 5 MG/ML
750 INJECTION, SOLUTION INTRAVENOUS EVERY 24 HOURS
Status: DISCONTINUED | OUTPATIENT
Start: 2023-11-18 | End: 2023-11-18 | Stop reason: HOSPADM

## 2023-11-18 RX ORDER — HYDROCODONE BITARTRATE AND ACETAMINOPHEN 5; 325 MG/1; MG/1
1 TABLET ORAL EVERY 8 HOURS PRN
Qty: 9 TABLET | Refills: 0 | Status: SHIPPED | OUTPATIENT
Start: 2023-11-18 | End: 2023-11-21

## 2023-11-18 RX ORDER — KETOROLAC TROMETHAMINE 30 MG/ML
INJECTION, SOLUTION INTRAMUSCULAR; INTRAVENOUS PRN
Status: DISCONTINUED | OUTPATIENT
Start: 2023-11-18 | End: 2023-11-18 | Stop reason: SDUPTHER

## 2023-11-18 RX ORDER — ROPINIROLE 1 MG/1
1 TABLET, FILM COATED ORAL ONCE
Status: COMPLETED | OUTPATIENT
Start: 2023-11-18 | End: 2023-11-18

## 2023-11-18 RX ORDER — DEXMEDETOMIDINE HYDROCHLORIDE 4 UG/ML
INJECTION, SOLUTION INTRAVENOUS PRN
Status: DISCONTINUED | OUTPATIENT
Start: 2023-11-18 | End: 2023-11-18 | Stop reason: SDUPTHER

## 2023-11-18 RX ORDER — TRAMADOL HYDROCHLORIDE 50 MG/1
100 TABLET ORAL PRN
Status: COMPLETED | OUTPATIENT
Start: 2023-11-18 | End: 2023-11-18

## 2023-11-18 RX ORDER — SODIUM CHLORIDE 0.9 % (FLUSH) 0.9 %
5-40 SYRINGE (ML) INJECTION EVERY 12 HOURS SCHEDULED
Status: DISCONTINUED | OUTPATIENT
Start: 2023-11-18 | End: 2023-11-18 | Stop reason: HOSPADM

## 2023-11-18 RX ORDER — METOCLOPRAMIDE HYDROCHLORIDE 5 MG/ML
10 INJECTION INTRAMUSCULAR; INTRAVENOUS
Status: DISCONTINUED | OUTPATIENT
Start: 2023-11-18 | End: 2023-11-18 | Stop reason: HOSPADM

## 2023-11-18 RX ORDER — ONDANSETRON 2 MG/ML
4 INJECTION INTRAMUSCULAR; INTRAVENOUS
Status: DISCONTINUED | OUTPATIENT
Start: 2023-11-18 | End: 2023-11-18 | Stop reason: HOSPADM

## 2023-11-18 RX ORDER — TRAMADOL HYDROCHLORIDE 50 MG/1
50 TABLET ORAL PRN
Status: COMPLETED | OUTPATIENT
Start: 2023-11-18 | End: 2023-11-18

## 2023-11-18 RX ORDER — LEVOFLOXACIN 500 MG/1
500 TABLET, FILM COATED ORAL DAILY
Qty: 10 TABLET | Refills: 0 | Status: SHIPPED | OUTPATIENT
Start: 2023-11-18 | End: 2023-11-28

## 2023-11-18 RX ORDER — SODIUM CHLORIDE 9 MG/ML
INJECTION, SOLUTION INTRAVENOUS PRN
Status: DISCONTINUED | OUTPATIENT
Start: 2023-11-18 | End: 2023-11-18 | Stop reason: HOSPADM

## 2023-11-18 RX ADMIN — PROPOFOL 60 MG: 10 INJECTION, EMULSION INTRAVENOUS at 09:12

## 2023-11-18 RX ADMIN — LEVOFLOXACIN 750 MG: 5 INJECTION, SOLUTION INTRAVENOUS at 12:45

## 2023-11-18 RX ADMIN — LIDOCAINE HYDROCHLORIDE 60 MG: 20 INJECTION, SOLUTION EPIDURAL; INFILTRATION; INTRACAUDAL; PERINEURAL at 09:12

## 2023-11-18 RX ADMIN — PROPOFOL 150 MCG/KG/MIN: 10 INJECTION, EMULSION INTRAVENOUS at 09:13

## 2023-11-18 RX ADMIN — TIMOLOL MALEATE 1 DROP: 5 SOLUTION OPHTHALMIC at 10:59

## 2023-11-18 RX ADMIN — KETOROLAC TROMETHAMINE 15 MG: 30 INJECTION, SOLUTION INTRAMUSCULAR; INTRAVENOUS at 09:32

## 2023-11-18 RX ADMIN — TRAMADOL HYDROCHLORIDE 50 MG: 50 TABLET, COATED ORAL at 09:54

## 2023-11-18 RX ADMIN — ROPINIROLE HYDROCHLORIDE 1 MG: 1 TABLET, FILM COATED ORAL at 10:57

## 2023-11-18 RX ADMIN — DEXMEDETOMIDINE HYDROCHLORIDE IN 0.9% SODIUM CHLORIDE 23.84 MCG: 4 INJECTION INTRAVENOUS at 09:12

## 2023-11-18 RX ADMIN — SODIUM CHLORIDE: 9 INJECTION, SOLUTION INTRAVENOUS at 02:27

## 2023-11-18 RX ADMIN — SILVER SULFADIAZINE: 10 CREAM TOPICAL at 11:00

## 2023-11-18 ASSESSMENT — PAIN - FUNCTIONAL ASSESSMENT
PAIN_FUNCTIONAL_ASSESSMENT: PREVENTS OR INTERFERES SOME ACTIVE ACTIVITIES AND ADLS

## 2023-11-18 ASSESSMENT — PAIN DESCRIPTION - LOCATION
LOCATION: LEG

## 2023-11-18 ASSESSMENT — PAIN DESCRIPTION - ORIENTATION
ORIENTATION: RIGHT
ORIENTATION: RIGHT;LOWER

## 2023-11-18 ASSESSMENT — PAIN SCALES - GENERAL
PAINLEVEL_OUTOF10: 6
PAINLEVEL_OUTOF10: 0
PAINLEVEL_OUTOF10: 3
PAINLEVEL_OUTOF10: 6
PAINLEVEL_OUTOF10: 5
PAINLEVEL_OUTOF10: 2

## 2023-11-18 ASSESSMENT — PAIN DESCRIPTION - ONSET
ONSET: ON-GOING
ONSET: PROGRESSIVE
ONSET: ON-GOING

## 2023-11-18 ASSESSMENT — PAIN DESCRIPTION - FREQUENCY
FREQUENCY: INTERMITTENT
FREQUENCY: CONTINUOUS
FREQUENCY: CONTINUOUS

## 2023-11-18 ASSESSMENT — ENCOUNTER SYMPTOMS
SHORTNESS OF BREATH: 1
DYSPNEA ACTIVITY LEVEL: AFTER AMBULATING 2 FLIGHTS OF STAIRS

## 2023-11-18 ASSESSMENT — COPD QUESTIONNAIRES: CAT_SEVERITY: MODERATE

## 2023-11-18 ASSESSMENT — PAIN DESCRIPTION - DESCRIPTORS
DESCRIPTORS: BURNING
DESCRIPTORS: ACHING

## 2023-11-18 ASSESSMENT — PAIN DESCRIPTION - PAIN TYPE
TYPE: SURGICAL PAIN
TYPE: ACUTE PAIN
TYPE: SURGICAL PAIN

## 2023-11-18 NOTE — PLAN OF CARE
Problem: Discharge Planning  Goal: Discharge to home or other facility with appropriate resources  11/18/2023 1509 by Joellen Fleischer, RN  Outcome: Completed  11/18/2023 0434 by Aishwarya Dalton RN  Outcome: Progressing  Flowsheets (Taken 11/18/2023 9450)  Discharge to home or other facility with appropriate resources: Identify barriers to discharge with patient and caregiver     Problem: Safety - Adult  Goal: Free from fall injury  11/18/2023 1509 by Joellen Fleischer, RN  Outcome: Completed  11/18/2023 0434 by Aishwarya Dalton RN  Outcome: Progressing  Flowsheets (Taken 11/18/2023 0434)  Free From Fall Injury: Instruct family/caregiver on patient safety     Problem: Pain  Goal: Verbalizes/displays adequate comfort level or baseline comfort level  11/18/2023 1509 by Joellen Fleischer, RN  Outcome: Completed  11/18/2023 0434 by Aishwarya Dalton RN  Outcome: Progressing  Flowsheets (Taken 11/18/2023 0434)  Verbalizes/displays adequate comfort level or baseline comfort level:   Encourage patient to monitor pain and request assistance   Assess pain using appropriate pain scale   Administer analgesics based on type and severity of pain and evaluate response   Implement non-pharmacological measures as appropriate and evaluate response   Consider cultural and social influences on pain and pain management   Notify Licensed Independent Practitioner if interventions unsuccessful or patient reports new pain     Problem: Skin/Tissue Integrity - Adult  Goal: Skin integrity remains intact  11/18/2023 1509 by Joellen Fleischer, RN  Outcome: Completed  11/18/2023 0434 by Aishwarya Dalton RN  Outcome: Progressing  Flowsheets (Taken 11/18/2023 0434)  Skin Integrity Remains Intact:   Monitor for areas of redness and/or skin breakdown   Assess vascular access sites hourly  Goal: Incisions, wounds, or drain sites healing without S/S of infection  11/18/2023 1509 by Joellen Fleischer, RN  Outcome: Completed  11/18/2023 0434 by Aishwarya Dalton RN  Outcome:

## 2023-11-18 NOTE — PLAN OF CARE
Problem: Discharge Planning  Goal: Discharge to home or other facility with appropriate resources  Outcome: Progressing  Flowsheets (Taken 11/18/2023 0434)  Discharge to home or other facility with appropriate resources: Identify barriers to discharge with patient and caregiver     Problem: Safety - Adult  Goal: Free from fall injury  Outcome: Progressing  Flowsheets (Taken 11/18/2023 0434)  Free From Fall Injury: Instruct family/caregiver on patient safety     Problem: Pain  Goal: Verbalizes/displays adequate comfort level or baseline comfort level  Outcome: Progressing  Flowsheets (Taken 11/18/2023 0434)  Verbalizes/displays adequate comfort level or baseline comfort level:   Encourage patient to monitor pain and request assistance   Assess pain using appropriate pain scale   Administer analgesics based on type and severity of pain and evaluate response   Implement non-pharmacological measures as appropriate and evaluate response   Consider cultural and social influences on pain and pain management   Notify Licensed Independent Practitioner if interventions unsuccessful or patient reports new pain     Problem: Skin/Tissue Integrity - Adult  Goal: Skin integrity remains intact  Outcome: Progressing  Flowsheets (Taken 11/18/2023 0434)  Skin Integrity Remains Intact:   Monitor for areas of redness and/or skin breakdown   Assess vascular access sites hourly  Goal: Incisions, wounds, or drain sites healing without S/S of infection  Outcome: Progressing  Flowsheets (Taken 11/18/2023 0434)  Incisions, Wounds, or Drain Sites Healing Without Sign and Symptoms of Infection:   TWICE DAILY: Assess and document skin integrity   TWICE DAILY: Assess and document dressing/incision, wound bed, drain sites and surrounding tissue   Implement wound care per orders   Initiate isolation precautions as appropriate

## 2023-11-18 NOTE — CONSULTS
[facility] [address] [place] [address]                                  CONSULTATION    PATIENT NAME: Randy Lucero                 :        1950  MED REC NO:   [id number]                              ROOM:       0326  ACCOUNT NO:   [de-identified]                           ADMIT DATE: 2023  PROVIDER:     Cassia Gallego    CONSULT DATE:  2023    HISTORY OF PRESENT ILLNESS:  The patient is a 79-year-old female who  apparently had a laceration in her right leg about three weeks ago. This was a skin flap. This has become infected. She has been on two  courses of antibiotics. She has been admitted this time because of  developing cellulitis in the right leg. PHYSICAL EXAMINATION:  She has about a 4 to 5 cm area where there were  multiple Steri-Strips. There appears to be necrotic material underneath  this area. She has gotten some surrounding cellulitis. She is able to  flex and extend her ankle without a lot of discomfort. IMAGING DATA:  X-rays were obtained. No acute fractures or dislocations  were noted. No free air was noted in the soft tissues. IMPRESSION AND PLAN:  Full-thickness skin loss with necrotic tissue in  the anterior portion of the right tibia. Findings were discussed with  her and her . I have recommended taking her to surgery tomorrow  for surgical debridement of this area with possible application of wound  VAC.  _____ and possible complications were discussed. All questions  were answered. JANEL Santos    D: 2023 13:34:34       T: 2023 15:29:40     [email or web address]  Job#: [id number]     Doc#: [id number]    CC:

## 2023-11-18 NOTE — PROGRESS NOTES
Writer to bedside to complete morning assessment. Upon entry to room, pt in bed awake, respirations even and unlabored while on room air. Vitals obtained and assessment completed, see flow sheet for details. Pt is A&Ox4. Pt denies any pain. Lung sounds are clear throughout. Right lower leg has old drainage noted and steri strips in place. Pt updated on plan of care and whiteboard updated. Pt denies further needs from writer at this time. Call light in reach. Care ongoing.

## 2023-11-18 NOTE — BRIEF OP NOTE
Brief Postoperative Note      Patient: Abby Grace  YOB: 1950  MRN: [id number]    Date of Procedure: 11/18/2023    Pre-Op Diagnosis Codes:     * Infection [B99.9]    Post-Op Diagnosis: Same       Procedure(s):  LEG INCISION AND DRAINAGE    Surgeon(s):  Compa Jacobson MD    Assistant:  * No surgical staff found *    Anesthesia: Choice    Estimated Blood Loss (mL): Minimal    Complications: None    Specimens:   * No specimens in log *    Implants:  * No implants in log *      Drains: * No LDAs found *    Findings:       Electronically signed by Compa Jacobson MD on 11/18/2023 at 9:39 AM

## 2023-11-18 NOTE — PROGRESS NOTES
Discharge Criteria    Inpatients must meet Criteria 1 through 7. All other patients are either YES or N/A. If a NO is chosen then Anesthesia or Surgeon must be notified. 1.  Minimum 30 minutes after last dose of sedative medication. Yes      2. Systolic BP between 90 - 490. Diastolic BP between 60 - 90. Yes      3. Pulse between 60 - 120    Yes      4. Respirations between 8 - 25. Yes      5. SpO2 92% - 100%. Yes      6. Able to cough and swallow or return to baseline function. Yes      7. Alert and oriented or return to baseline mental status. Yes      8. Demonstrates controlled, coordinated movements, ambulates with steady gait, or return to baseline activity function. N/A      9. Minimal or no pain or nausea, or at a level tolerable and acceptable to patient. N/A      10. Takes and retains oral fluids as allowed. N/A      11. Procedural / perioperative site stable. Minimal or no bleeding. N/A          12. If GI endoscopy procedure, minimal or no abdominal distention or passing flatus. N/A      13. Written discharge instructions and emergency telephone number provided. N/A      14. Accompanied by a responsible adult.     N/A

## 2023-11-18 NOTE — ANESTHESIA POSTPROCEDURE EVALUATION
Department of Anesthesiology  Postprocedure Note    Patient: Pasha Robertson  MRN: [id number]  YOB: 1950  Date of evaluation: 11/18/2023      Procedure Summary     Date: 11/18/23 Room / Location: [address] [place] [address]    Anesthesia Start: 8372 Anesthesia Stop: 5421    Procedure: LEG INCISION AND DRAINAGE (Right) Diagnosis:       Infection      (Infection [B99.9])    Surgeons: Arnold Bowman MD Responsible Provider: ESTELLE Velasquez CRNA    Anesthesia Type: general ASA Status: 2          Anesthesia Type: No value filed.     Elvira Phase I: Elvira Score: 9    Elvira Phase II:        Anesthesia Post Evaluation    Patient location during evaluation: PACU  Patient participation: complete - patient participated  Level of consciousness: awake and alert  Pain score: 3  Airway patency: patent  Nausea & Vomiting: no vomiting and no nausea  Complications: no  Cardiovascular status: blood pressure returned to baseline  Respiratory status: acceptable  Hydration status: stable  Multimodal analgesia pain management approach  Pain management: adequate

## 2023-11-18 NOTE — H&P
Jonathan Butterfield M.D. Internal Medicine History and Phyisical    Patient: Kirsty Quan  Date of Admission: 11/17/2023 10:50 AM  Date of Evaluation: 11/18/2023      Patient:  Kirsty Quan  MRN: [id number]    Chief Complaint: Redness and pain in her leg      History Obtained From:  patient, electronic medical record    PCP: ESTELLE Mckeon CNP    History of Present Illness: The patient is a 68 y.o. female who presents with history of right lower extremity cellulitis that is developed progressive after having a skin tear and injury. There was a lot of aggressive Steri-Strip being and gluing of the wound that I think is inhibited some of the healing. She denies any fevers chills. She noticed a foul drainage from the leg. She sought medical attention. She was seen in the office yesterday by my nurse practitioner and we elected to admit her to the hospital for aggressive care. Orthopedic was consulted she was sent to the operating room. Past Medical History:        Diagnosis Date    Arthritis     CAD (coronary artery disease) 4/11, 9/12    4 total stents. 2 Stents in RCA, 1 in small OM1.    COPD (chronic obstructive pulmonary disease) ([facility])     MHT pulmonary    H/O cardiovascular stress test 5/22/13, 6/25/14    Relatively normal without evidence of significant ischemia or infarction. global LV systolic function was normal w/o regional wall motion abnormalities. No significant EKG evidence of ischemia during monitoring w/o significant associated arrhythmias. Tay score is 5. H/O cardiovascular stress test 12/28/2017    Larglely normal myocardial perfusion imaging with soft tissue artifact but w/o evidence of significant myocardial ischemia or infarction. results are most consistent with low/intermediate risk for significant CAD    H/O echocardiogram 5/21/13, 6/24/14    EF>55%, mild concentric LVH    History of echocardiogram 11/27/2018    EF of 60%.  evidence of moderate diastolic dysfunction is seen. Hx of cardiac catheterization 08/09/2018    MIld CAD w/o any significant focal stenosis with a normal LV end diastolic pressure LVEDP interestingly the pt developed pretty pronounced ST depression on Lt main engagment with mod chest pressure which resolved o disengagement of the LT main    Hx of heart artery stent 10/06/2011    Lesion of Prox RCA 75% stenosis-Drug Eluting stent to RCA    Hx of percutaneous left heart catheterization 04/07/2011    LMCA, LAD & RAMUS- normal, LCX-lesion on Mid CX 40% stenosis, RCA-Patent stent, EF 60%    Hx of percutaneous left heart catheterization 04/11/2012    Normal renals, Patent RCA stents with mid LCX stenosis IVUS suggest 60-70% stenosis and due to continues ischemia, a LISA was inserted    Hx of percutaneous left heart catheterization 09/27/2012    LMCA & LAD -Normal, LCXMild luminal irregularities, Patent prior stent, RCA-Patent stents in Prox and mid segment    Hyperlipidemia     Idiopathic peripheral neuropathy     [facility] neurology    Normal nuclear stress test 06/06/2012    Rheumatoid arthritis involving multiple sites with positive rheumatoid factor ([address])     Dr. Laron Verduzco [place]    Vitamin D insufficiency        Past Surgical History:        Procedure Laterality Date    CARDIAC CATHETERIZATION Left 08/09/2018    [facility] Dr Jennet Mcardle significant coronary artery disease. Widely patent RCA stent.  However, the patient did have pretty severe ST depression on left main engagement with moderate chest pressure that resolved on disengagement    [id number] Marycarmen Myers Right 11/18/2023    Dr. Resendez See Right 12/02/2022    FINGER TRIGGER RELEASE- THUMB performed by Shahla Pena MD at [address]      disc replaced in neck       Family History:       Problem Relation Age of Onset    Cancer Mother         breast cancer    No Known Problems Sister     No Known Problems Sister     Cancer Brother        Social History:   TOBACCO:   reports that she quit smoking about 23 years ago. Her smoking use included cigarettes. She has a 40.00 pack-year smoking history. She has never used smokeless tobacco.  ETOH:   reports current alcohol use. Allergies:  Benadryl [diphenhydramine], Iv dye [iodides], Pcn [penicillins], and Delsym [dextromethorphan hbr]    Medications Prior to Admission:    Prior to Admission medications    Medication Sig Start Date End Date Taking? Authorizing Provider   rOPINIRole (REQUIP) 0.5 MG tablet TAKE 2 TABLETS BY MOUTH EVERY EVENING 11/9/23   ESTELLE Barbosa CNP   Fluticasone-Salmeterol 179-02 MCG/ACT AEPB Inhale 1 puff into the lungs in the morning and at bedtime 11/9/23 12/9/23  ESTELLE Kee CNP   levoFLOXacin (LEVAQUIN) 500 MG tablet Take 1 tablet by mouth daily for 10 days 11/9/23 11/19/23  ESTELLE Barbosa CNP   hydrOXYzine HCl (ATARAX) 25 MG tablet Take 1 tablet by mouth nightly 11/9/23 12/9/23  ESTELLE Barbosa CNP   atorvastatin (LIPITOR) 40 MG tablet TAKE 2 TABLETS EVERY EVENING 11/7/23   ESTELLE Barbosa CNP   gabapentin (NEURONTIN) 300 MG capsule Take 1 capsule by mouth daily for 360 doses.  8/3/23 7/28/24  ESTELLE Barbosa CNP   ibuprofen (IBU) 400 MG tablet Take 1 tablet by mouth every 6 hours as needed for Pain 7/24/23   Kit Koch MD   triamcinolone (KENALOG) 0.1 % cream  6/23/23   ProviderEj MD   pantoprazole (PROTONIX) 40 MG tablet TAKE 1 TABLET EVERY DAY 5/22/23   ESTELLE Barbosa CNP   albuterol sulfate HFA (VENTOLIN HFA) 108 (90 Base) MCG/ACT inhaler Inhale 2 puffs into the lungs every 6 hours as needed for Wheezing for wheezing 4/24/23 11/2/23  Nico Thompson MD   LUMIGAN 0.01 % SOLN ophthalmic drops Place 1 drop into both eyes nightly 12/27/22   ProviderEj MD   inFLIXimab (REMICADE) 100 MG

## 2023-11-18 NOTE — DISCHARGE INSTR - DIET

## 2023-11-18 NOTE — PROGRESS NOTES
Patient sitting high fowlers in bed at this time. Vital signs and assessment completed. Patient c/o of no pain from leg wound at this time as long as wound is not touched. Patient did have dried blood noted to around wound, writer offered to clean up around wound, patient requested writer not touch it d/t sensitivity and pain it would bring. Patient is alert and orientated. Patient is stable on feet and capable of moving about in the room independently. Patient encouraged to call out if patient is feeling lightheadness, unsteady or is in need of any assistance. Urine hat emptied. Patient denies any other needs at this time. Call light, bedside table and personal belongings within reach.

## 2023-11-18 NOTE — PROGRESS NOTES
Writer went over discharge instructions with pt at bedside. Writer provided written education about wound care and antibiotic therapy. Pt verbalized understanding. Pt getting dressed at this time. Pt taken down via wheelchair to car for discharge. Pt belongings in hand.

## 2023-11-19 LAB
MICROORGANISM SPEC CULT: NORMAL
MICROORGANISM SPEC CULT: NORMAL
SERVICE CMNT-IMP: NORMAL
SERVICE CMNT-IMP: NORMAL
SPECIMEN DESCRIPTION: NORMAL
SPECIMEN DESCRIPTION: NORMAL

## 2023-11-19 NOTE — OP NOTE
[place], [place] [address]                                OPERATIVE REPORT    PATIENT NAME: Steve Vila                 :        1950  MED REC NO:   [id number]                              ROOM:       [id number]  ACCOUNT NO:   [de-identified]                           ADMIT DATE: 2023  PROVIDER:     Isabella Morrow    DATE OF PROCEDURE:  2023    PREOPERATIVE DIAGNOSIS:  Laceration of right distal tibia. POSTOPERATIVE DIAGNOSIS:  Laceration of right distal tibia. PROCEDURE PERFORMED:  Irrigation and debridement of wound of right leg. SURGEON:  Dr. Isabella Morrow. ANESTHESIA:  General.    DESCRIPTION OF PROCEDURE:  The patient was brought into the operating  room and placed in the supine position on the operating table. General  anesthetic was administered. The patient was receiving Levaquin. The  patient had Steri-Strips on her right leg. These were removed and the  wound was scrubbed. The leg was draped in a sterile fashion. The  patient was noted to have some granulation tissue. No bone was exposed. Necrotic tissue was excised. The wound was thoroughly debrided. The  wound measured about 6 cm x 5 cm in size. Had good granulation bed. A  knife was not necessary for the debridement. Following this, the wound  was dressed with Silvadene cream and Xeroform gauze, fluffs, Kerlix  roll, and an Ace wrap. General anesthetic was discontinued. The  patient was transferred to Recovery in a stable condition. [facility] louis MORROW    D: 2023 9:54:09       T: 2023 13:34:13     [email or web address]  Job#: [id number]     Doc#: [id number]    CC:

## 2023-11-21 NOTE — DISCHARGE SUMMARY
Discharge Summary    Zack Jeter  :  1950  MRN:  [id number]    Admit date:  2023      Discharge date:  2023     Admitting Physician:  Jennifer Ventura MD    Discharge Diagnoses:      Principal Problem:    Cellulitis of right leg  Resolved Problems:    * No resolved hospital problems. *      Active Hospital Problems    Diagnosis Date Noted    Cellulitis of right leg [P19.974] 2023       Discharge Medications:         Medication List        START taking these medications      HYDROcodone-acetaminophen 5-325 MG per tablet  Commonly known as: Norco  Take 1 tablet by mouth every 8 hours as needed for Pain for up to 3 days. Intended supply: 3 days. Take lowest dose possible to manage pain Max Daily Amount: 3 tablets     silver sulfADIAZINE 1 % cream  Commonly known as: SILVADENE  Apply topically daily. CONTINUE taking these medications      albuterol sulfate  (90 Base) MCG/ACT inhaler  Commonly known as: Ventolin HFA  Inhale 2 puffs into the lungs every 6 hours as needed for Wheezing for wheezing     atorvastatin 40 MG tablet  Commonly known as: LIPITOR  TAKE 2 TABLETS EVERY EVENING     famciclovir 250 MG tablet  Commonly known as: FAMVIR     Fluticasone-Salmeterol 113-14 MCG/ACT Aepb  Inhale 1 puff into the lungs in the morning and at bedtime     gabapentin 300 MG capsule  Commonly known as: Neurontin  Take 1 capsule by mouth daily for 360 doses.      hydrOXYzine HCl 25 MG tablet  Commonly known as: ATARAX  Take 1 tablet by mouth nightly     ipratropium 0.5 mg-albuterol 2.5 mg 0.5-2.5 (3) MG/3ML Soln nebulizer solution  Commonly known as: DUONEB  Inhale 3 mLs into the lungs every 4 hours as needed for Shortness of Breath     levoFLOXacin 500 MG tablet  Commonly known as: LEVAQUIN  Take 1 tablet by mouth daily for 10 days     Lumigan 0.01 % Soln ophthalmic drops  Generic drug: bimatoprost     nitroGLYCERIN 0.4 MG SL tablet  Commonly known as: NITROSTAT  DISSOLVE 1 TABLET UNDER THE TONGUE EVERY 5 MINUTES AS  NEEDED FOR CHEST PAIN. MAX  OF 3 TABLETS IN 15 MINUTES. CALL 911 IF PAIN PERSISTS. pantoprazole 40 MG tablet  Commonly known as: PROTONIX  TAKE 1 TABLET EVERY DAY     rOPINIRole 0.5 MG tablet  Commonly known as: REQUIP  TAKE 2 TABLETS BY MOUTH EVERY EVENING     timolol 0.5 % ophthalmic solution  Commonly known as: BETIMOL     triamcinolone 0.1 % cream  Commonly known as: KENALOG            STOP taking these medications      aspirin 81 MG tablet     ibuprofen 400 MG tablet  Commonly known as: IBU     inFLIXimab 100 MG injection  Commonly known as: REMICADE               Where to Get Your Medications        These medications were sent to [facility] [id number] [address] [address]      Phone: [phone]   HYDROcodone-acetaminophen 5-325 MG per tablet  levoFLOXacin 500 MG tablet       Information about where to get these medications is not yet available    Ask your nurse or doctor about these medications  silver sulfADIAZINE 1 % cream         Consultants:  orthopedic surgery     Hospital Course:   Kendall Mendoza is a 68 y.o. female admitted with   Admitted for infection of the right lower extremity. Wound clean out done by Orthopedic and antibiotic recommended at discharge. Patient improved and was   Discharge home    Exam:  regular heart rate. Clear lung sounds. Soft abdomen. Right lower extremity clean dressed appropriately.     Condition:   Good    Disposition:   Home    DC summary time : 35 minutes    Patient will be followed by ESTELLE Murcia CNP in 1-2 weeks    Signed:  Shanna Roy MD  11/21/2023, 9:11 AM

## 2023-12-14 NOTE — PROGRESS NOTES
Subjective:      Patient ID: Eli SESAY Speaker is a 73 y.o. female.         HPI  Patient here for follow-up for COPD, history of COVID in November 2020. Last seen in office per Dr. Honeycutt in April 2023    Patient states that in the morning she has more tightness and noisiness with her breathing and has a cough that is generally nonproductive.  Denies any sputum productivity specifically denies any hemoptysis.  Discussed with patient she is currently using her fluticasone-salmeterol inhaler And Then Following with Her Albuterol HFA, Encourage Patient to Actually Reverse This and Use Her Albuterol First and Then 20 Minutes Later Follow with Her Fluticasone-Salmeterol Inhaler.    Reviewed her CT scan from July 2023 with her, she has had 2 years of radiographic stability for lung nodules and has been smoke-free for 23 years.  No longer needing to follow with radiology imaging.    Medications:   Fluticasone-Salmeterol 113-14 mcg:   Albuterol HFA:    Smoking status:  Former smoker quit in 2000 with a 20-pack-year smoking history    Occupational status/pets:  , no pets at home, no indoor hot tubs or Jacuzzis     PRIOR WORKUP:  PFT:  PFT 6/23/2020: Spirometry with a restrictive flow volume loop.  FEV1 47% of predicted with a 21% bronchodilator change.  FVC 49% of predicted with a 22% bronchodilator change.  FEV1/FVC ratio 72.  Lung volumes by body box show total lung capacity 148% of predicted, % of predicted consistent with air trapping.  Diffusion capacity when corrected for alveolar volume is 76% of predicted airway resistance is increased.  Final impression: Combined obstructive and restrictive dysfunction.  Moderate in severity with a significant bronchodilator response.     CT Imaging:  CT chest 7/24/2023: No mediastinal adenopathy.  Punctate right upper lobe nodules are stable.  Punctate left apical nodule is unchanged.  Additional sub-3 mm left upper lobe nodule is unchanged.  Punctate right lower

## 2024-01-11 ENCOUNTER — OFFICE VISIT (OUTPATIENT)
Dept: PULMONOLOGY | Age: 74
End: 2024-01-11
Payer: MEDICARE

## 2024-01-11 VITALS
BODY MASS INDEX: 23.34 KG/M2 | HEIGHT: 63 IN | RESPIRATION RATE: 16 BRPM | SYSTOLIC BLOOD PRESSURE: 128 MMHG | HEART RATE: 73 BPM | WEIGHT: 131.7 LBS | DIASTOLIC BLOOD PRESSURE: 80 MMHG | TEMPERATURE: 96.6 F

## 2024-01-11 DIAGNOSIS — Z87.891 FORMER HEAVY TOBACCO SMOKER: Primary | ICD-10-CM

## 2024-01-11 DIAGNOSIS — R91.1 LUNG NODULE: ICD-10-CM

## 2024-01-11 DIAGNOSIS — J44.9 STAGE 2 MODERATE COPD BY GOLD CLASSIFICATION (HCC): ICD-10-CM

## 2024-01-11 PROCEDURE — 99214 OFFICE O/P EST MOD 30 MIN: CPT | Performed by: NURSE PRACTITIONER

## 2024-01-11 RX ORDER — FLUTICASONE PROPIONATE AND SALMETEROL 113; 14 UG/1; UG/1
1 POWDER, METERED RESPIRATORY (INHALATION) 2 TIMES DAILY
Qty: 1 EACH | Refills: 11 | Status: SHIPPED | OUTPATIENT
Start: 2024-01-11 | End: 2024-02-10

## 2024-01-11 RX ORDER — ALBUTEROL SULFATE 90 UG/1
2 AEROSOL, METERED RESPIRATORY (INHALATION) EVERY 6 HOURS PRN
Qty: 3 EACH | Refills: 3 | Status: SHIPPED | OUTPATIENT
Start: 2024-01-11 | End: 2024-04-10

## 2024-01-11 RX ORDER — FOLIC ACID 1 MG/1
1 TABLET ORAL DAILY
COMMUNITY
Start: 2024-01-02

## 2024-01-11 ASSESSMENT — ENCOUNTER SYMPTOMS
EYES NEGATIVE: 1
ALLERGIC/IMMUNOLOGIC NEGATIVE: 1
GASTROINTESTINAL NEGATIVE: 1

## 2024-01-31 ENCOUNTER — OFFICE VISIT (OUTPATIENT)
Dept: CARDIOLOGY | Age: 74
End: 2024-01-31
Payer: MEDICARE

## 2024-01-31 VITALS
OXYGEN SATURATION: 98 % | BODY MASS INDEX: 22.68 KG/M2 | HEART RATE: 74 BPM | RESPIRATION RATE: 18 BRPM | WEIGHT: 128 LBS | DIASTOLIC BLOOD PRESSURE: 74 MMHG | SYSTOLIC BLOOD PRESSURE: 148 MMHG | HEIGHT: 63 IN

## 2024-01-31 DIAGNOSIS — Z95.820 S/P ANGIOPLASTY WITH STENT: ICD-10-CM

## 2024-01-31 DIAGNOSIS — I25.10 CORONARY ARTERY DISEASE INVOLVING NATIVE CORONARY ARTERY OF NATIVE HEART WITHOUT ANGINA PECTORIS: ICD-10-CM

## 2024-01-31 DIAGNOSIS — E78.2 MIXED HYPERLIPIDEMIA: ICD-10-CM

## 2024-01-31 DIAGNOSIS — I10 ESSENTIAL HYPERTENSION: ICD-10-CM

## 2024-01-31 DIAGNOSIS — I20.9 ANGINA, CLASS II (HCC): Primary | ICD-10-CM

## 2024-01-31 DIAGNOSIS — M79.602 PAIN OF LEFT UPPER EXTREMITY: ICD-10-CM

## 2024-01-31 DIAGNOSIS — I35.1 MODERATE AORTIC REGURGITATION: ICD-10-CM

## 2024-01-31 PROCEDURE — 3017F COLORECTAL CA SCREEN DOC REV: CPT | Performed by: FAMILY MEDICINE

## 2024-01-31 PROCEDURE — 1036F TOBACCO NON-USER: CPT | Performed by: FAMILY MEDICINE

## 2024-01-31 PROCEDURE — 1090F PRES/ABSN URINE INCON ASSESS: CPT | Performed by: FAMILY MEDICINE

## 2024-01-31 PROCEDURE — G8484 FLU IMMUNIZE NO ADMIN: HCPCS | Performed by: FAMILY MEDICINE

## 2024-01-31 PROCEDURE — G8427 DOCREV CUR MEDS BY ELIG CLIN: HCPCS | Performed by: FAMILY MEDICINE

## 2024-01-31 PROCEDURE — 99214 OFFICE O/P EST MOD 30 MIN: CPT | Performed by: FAMILY MEDICINE

## 2024-01-31 PROCEDURE — 3077F SYST BP >= 140 MM HG: CPT | Performed by: FAMILY MEDICINE

## 2024-01-31 PROCEDURE — 99211 OFF/OP EST MAY X REQ PHY/QHP: CPT | Performed by: FAMILY MEDICINE

## 2024-01-31 PROCEDURE — 1123F ACP DISCUSS/DSCN MKR DOCD: CPT | Performed by: FAMILY MEDICINE

## 2024-01-31 PROCEDURE — G8420 CALC BMI NORM PARAMETERS: HCPCS | Performed by: FAMILY MEDICINE

## 2024-01-31 PROCEDURE — G8399 PT W/DXA RESULTS DOCUMENT: HCPCS | Performed by: FAMILY MEDICINE

## 2024-01-31 PROCEDURE — 3078F DIAST BP <80 MM HG: CPT | Performed by: FAMILY MEDICINE

## 2024-01-31 RX ORDER — ASPIRIN 81 MG/1
81 TABLET ORAL DAILY
COMMUNITY

## 2024-01-31 RX ORDER — NITROGLYCERIN 0.4 MG/1
TABLET SUBLINGUAL
Qty: 75 TABLET | Refills: 4 | Status: SHIPPED | OUTPATIENT
Start: 2024-01-31

## 2024-01-31 RX ORDER — EZETIMIBE 10 MG/1
10 TABLET ORAL DAILY
Qty: 90 TABLET | Refills: 3 | Status: SHIPPED | OUTPATIENT
Start: 2024-01-31

## 2024-01-31 NOTE — PROGRESS NOTES
this at this time.     In the meantime, I encouraged her to continue all of her current medications and follow up with you as previously scheduled.     FOLLOW UP:   I told Ms. Marquez to call my office if she had any problems, but otherwise told her to Return in about 6 months (around 7/31/2024). However, I would be happy to see her sooner should the need arise. Once again, thank you for allowing me to participate in this patients care. Please do not hesitate to contact me could I be of further assistance.    Sincerely,  Ariel Farrell MD, MS, F.A.CDennisC.  [facility] Cardiology Specialist, [place] [id number]  Phone: [phone], Fax: [phone]     I believe that the risk of significant morbidity and mortality related to the patient's current medical conditions are: Intermediate. Approximately 30 minutes were spent during prep work, discussion and exam of the patient, and follow up documentation and all of their questions were answered.    The documentation recorded by the scribe, accurately and completely reflects the services I personally performed and the decisions made by me. Ariel Farrell MD, MS, F.A.C.C. January 31, 2024

## 2024-02-15 ENCOUNTER — APPOINTMENT (OUTPATIENT)
Dept: GENERAL RADIOLOGY | Age: 74
End: 2024-02-15
Payer: MEDICARE

## 2024-02-15 ENCOUNTER — HOSPITAL ENCOUNTER (OUTPATIENT)
Age: 74
Setting detail: OBSERVATION
Discharge: HOME | End: 2024-02-16
Admitting: INTERNAL MEDICINE
Payer: MEDICARE

## 2024-02-15 ENCOUNTER — APPOINTMENT (OUTPATIENT)
Age: 74
End: 2024-02-15
Attending: STUDENT IN AN ORGANIZED HEALTH CARE EDUCATION/TRAINING PROGRAM
Payer: MEDICARE

## 2024-02-15 DIAGNOSIS — R07.9 CHEST PAIN IN ADULT: ICD-10-CM

## 2024-02-15 DIAGNOSIS — R07.9 CHEST PAIN, UNSPECIFIED TYPE: Primary | ICD-10-CM

## 2024-02-15 DIAGNOSIS — I16.0 HYPERTENSIVE URGENCY: ICD-10-CM

## 2024-02-15 PROBLEM — R07.89 CHEST WALL PAIN: Status: ACTIVE | Noted: 2024-02-15

## 2024-02-15 LAB
ANION GAP SERPL CALCULATED.3IONS-SCNC: 11 MMOL/L (ref 9–17)
BASOPHILS # BLD: 0.04 K/UL (ref 0–0.2)
BASOPHILS NFR BLD: 1 % (ref 0–2)
BUN SERPL-MCNC: 16 MG/DL (ref 8–23)
BUN/CREAT SERPL: 27 (ref 9–20)
CALCIUM SERPL-MCNC: 8.9 MG/DL (ref 8.6–10.4)
CHLORIDE SERPL-SCNC: 106 MMOL/L (ref 98–107)
CO2 SERPL-SCNC: 26 MMOL/L (ref 20–31)
CREAT SERPL-MCNC: 0.6 MG/DL (ref 0.5–0.9)
ECHO AO ROOT DIAM: 2.1 CM
ECHO AO ROOT INDEX: 1.3 CM/M2
ECHO AV ACCELERATION TIME: 77.35 MS
ECHO AV CUSP MM: 1.6 CM
ECHO AV MEAN GRADIENT: 5 MMHG
ECHO AV MEAN VELOCITY: 1 M/S
ECHO AV PEAK VELOCITY: 1.6 M/S
ECHO AV REGURGITANT PHT: 420.7 MILLISECOND
ECHO AV VTI: 32.8 CM
ECHO BSA: 1.63 M2
ECHO EST RA PRESSURE: 3 MMHG
ECHO LA DIAMETER INDEX: 1.79 CM/M2
ECHO LA DIAMETER: 2.9 CM
ECHO LA MAJOR AXIS: 5.1 CM
ECHO LA TO AORTIC ROOT RATIO: 1.38
ECHO LA VOL BP: 40 ML (ref 22–52)
ECHO LA VOL MOD A2C: 49 ML (ref 22–52)
ECHO LA VOL MOD A4C: 31 ML (ref 22–52)
ECHO LA VOL/BSA BIPLANE: 25 ML/M2 (ref 16–34)
ECHO LA VOLUME AREA LENGTH: 41 ML
ECHO LA VOLUME INDEX AREA LENGTH: 25 ML/M2 (ref 16–34)
ECHO LA VOLUME INDEX MOD A2C: 30 ML/M2 (ref 16–34)
ECHO LA VOLUME INDEX MOD A4C: 19 ML/M2 (ref 16–34)
ECHO LV E' LATERAL VELOCITY: 8 CM/S
ECHO LV EDV A2C: 52 ML
ECHO LV EDV A4C: 55 ML
ECHO LV EDV BP: 54 ML (ref 56–104)
ECHO LV EDV INDEX A4C: 34 ML/M2
ECHO LV EDV INDEX BP: 33 ML/M2
ECHO LV EDV NDEX A2C: 32 ML/M2
ECHO LV EJECTION FRACTION BIPLANE: 60 % (ref 55–100)
ECHO LV ESV A2C: 20 ML
ECHO LV ESV A4C: 23 ML
ECHO LV ESV BP: 21 ML (ref 19–49)
ECHO LV ESV INDEX A2C: 12 ML/M2
ECHO LV ESV INDEX A4C: 14 ML/M2
ECHO LV ESV INDEX BP: 13 ML/M2
ECHO LV FRACTIONAL SHORTENING: 26 % (ref 28–44)
ECHO LV INTERNAL DIMENSION DIASTOLE INDEX: 2.35 CM/M2
ECHO LV INTERNAL DIMENSION DIASTOLIC: 3.8 CM (ref 3.9–5.3)
ECHO LV INTERNAL DIMENSION SYSTOLIC INDEX: 1.73 CM/M2
ECHO LV INTERNAL DIMENSION SYSTOLIC: 2.8 CM
ECHO LV IVSD: 0.9 CM (ref 0.6–0.9)
ECHO LV IVSS: 1.2 CM
ECHO LV MASS 2D: 101.1 G (ref 67–162)
ECHO LV MASS INDEX 2D: 62.4 G/M2 (ref 43–95)
ECHO LV POSTERIOR WALL DIASTOLIC: 0.9 CM (ref 0.6–0.9)
ECHO LV POSTERIOR WALL SYSTOLIC: 1.2 CM
ECHO LV RELATIVE WALL THICKNESS RATIO: 0.47
ECHO LVOT AV VTI INDEX: 0.72
ECHO LVOT MEAN GRADIENT: 2 MMHG
ECHO LVOT VTI: 23.7 CM
ECHO MV A VELOCITY: 0.94 M/S
ECHO MV E DECELERATION TIME (DT): 162.1 MS
ECHO MV E VELOCITY: 1.06 M/S
ECHO MV E/A RATIO: 1.13
ECHO MV E/E' LATERAL: 13.25
ECHO PV MAX VELOCITY: 0.7 M/S
ECHO RIGHT VENTRICULAR SYSTOLIC PRESSURE (RVSP): 12 MMHG
ECHO RV INTERNAL DIMENSION: 2.3 CM
ECHO TV REGURGITANT MAX VELOCITY: 1.53 M/S
EKG ATRIAL RATE: 79 BPM
EKG P AXIS: 82 DEGREES
EKG P-R INTERVAL: 172 MS
EKG Q-T INTERVAL: 376 MS
EKG QRS DURATION: 82 MS
EKG QTC CALCULATION (BAZETT): 431 MS
EKG R AXIS: 14 DEGREES
EKG T AXIS: 47 DEGREES
EKG VENTRICULAR RATE: 79 BPM
EOSINOPHIL # BLD: 0.18 K/UL (ref 0–0.44)
EOSINOPHILS RELATIVE PERCENT: 4 % (ref 1–4)
ERYTHROCYTE [DISTWIDTH] IN BLOOD BY AUTOMATED COUNT: 13 % (ref 11.8–14.4)
GFR SERPL CREATININE-BSD FRML MDRD: >60 ML/MIN/1.73M2
GLUCOSE SERPL-MCNC: 99 MG/DL (ref 70–99)
HCT VFR BLD AUTO: 36.5 % (ref 36.3–47.1)
HGB BLD-MCNC: 12 G/DL (ref 11.9–15.1)
IMM GRANULOCYTES # BLD AUTO: <0.03 K/UL (ref 0–0.3)
IMM GRANULOCYTES NFR BLD: 0 %
INR PPP: 0.9
INR PPP: 0.9
LYMPHOCYTES NFR BLD: 1.93 K/UL (ref 1.1–3.7)
LYMPHOCYTES RELATIVE PERCENT: 41 % (ref 24–43)
MAGNESIUM SERPL-MCNC: 2 MG/DL (ref 1.6–2.6)
MAGNESIUM SERPL-MCNC: 2 MG/DL (ref 1.6–2.6)
MCH RBC QN AUTO: 31.5 PG (ref 25.2–33.5)
MCHC RBC AUTO-ENTMCNC: 32.9 G/DL (ref 28.4–34.8)
MCV RBC AUTO: 95.8 FL (ref 82.6–102.9)
MONOCYTES NFR BLD: 0.51 K/UL (ref 0.1–1.2)
MONOCYTES NFR BLD: 11 % (ref 3–12)
NEUTROPHILS NFR BLD: 43 % (ref 36–65)
NEUTS SEG NFR BLD: 2.08 K/UL (ref 1.5–8.1)
NRBC BLD-RTO: 0 PER 100 WBC
PARTIAL THROMBOPLASTIN TIME: 26.3 SEC (ref 26.8–34.8)
PLATELET # BLD AUTO: 255 K/UL (ref 138–453)
PMV BLD AUTO: 8.9 FL (ref 8.1–13.5)
POTASSIUM SERPL-SCNC: 4.2 MMOL/L (ref 3.7–5.3)
PROTHROMBIN TIME: 11.9 SEC (ref 11.9–14.8)
PROTHROMBIN TIME: 12.4 SEC (ref 11.9–14.8)
RBC # BLD AUTO: 3.81 M/UL (ref 3.95–5.11)
SODIUM SERPL-SCNC: 143 MMOL/L (ref 135–144)
TROPONIN I SERPL HS-MCNC: 10 NG/L (ref 0–14)
TROPONIN I SERPL HS-MCNC: 14 NG/L (ref 0–14)
TROPONIN I SERPL HS-MCNC: 9 NG/L (ref 0–14)
TROPONIN I SERPL HS-MCNC: 9 NG/L (ref 0–14)
WBC OTHER # BLD: 4.8 K/UL (ref 3.5–11.3)

## 2024-02-15 PROCEDURE — 85610 PROTHROMBIN TIME: CPT

## 2024-02-15 PROCEDURE — 84484 ASSAY OF TROPONIN QUANT: CPT

## 2024-02-15 PROCEDURE — 6370000000 HC RX 637 (ALT 250 FOR IP): Performed by: PHYSICIAN ASSISTANT

## 2024-02-15 PROCEDURE — 96372 THER/PROPH/DIAG INJ SC/IM: CPT

## 2024-02-15 PROCEDURE — 85025 COMPLETE CBC W/AUTO DIFF WBC: CPT

## 2024-02-15 PROCEDURE — 85730 THROMBOPLASTIN TIME PARTIAL: CPT

## 2024-02-15 PROCEDURE — 93306 TTE W/DOPPLER COMPLETE: CPT

## 2024-02-15 PROCEDURE — 94761 N-INVAS EAR/PLS OXIMETRY MLT: CPT

## 2024-02-15 PROCEDURE — 99222 1ST HOSP IP/OBS MODERATE 55: CPT | Performed by: PHYSICIAN ASSISTANT

## 2024-02-15 PROCEDURE — 6370000000 HC RX 637 (ALT 250 FOR IP): Performed by: STUDENT IN AN ORGANIZED HEALTH CARE EDUCATION/TRAINING PROGRAM

## 2024-02-15 PROCEDURE — 94664 DEMO&/EVAL PT USE INHALER: CPT

## 2024-02-15 PROCEDURE — G0378 HOSPITAL OBSERVATION PER HR: HCPCS

## 2024-02-15 PROCEDURE — 96374 THER/PROPH/DIAG INJ IV PUSH: CPT

## 2024-02-15 PROCEDURE — 80048 BASIC METABOLIC PNL TOTAL CA: CPT

## 2024-02-15 PROCEDURE — 93005 ELECTROCARDIOGRAM TRACING: CPT

## 2024-02-15 PROCEDURE — 93010 ELECTROCARDIOGRAM REPORT: CPT | Performed by: INTERNAL MEDICINE

## 2024-02-15 PROCEDURE — 6360000002 HC RX W HCPCS

## 2024-02-15 PROCEDURE — 6360000002 HC RX W HCPCS: Performed by: STUDENT IN AN ORGANIZED HEALTH CARE EDUCATION/TRAINING PROGRAM

## 2024-02-15 PROCEDURE — 83735 ASSAY OF MAGNESIUM: CPT

## 2024-02-15 PROCEDURE — 2580000003 HC RX 258: Performed by: STUDENT IN AN ORGANIZED HEALTH CARE EDUCATION/TRAINING PROGRAM

## 2024-02-15 PROCEDURE — 93306 TTE W/DOPPLER COMPLETE: CPT | Performed by: INTERNAL MEDICINE

## 2024-02-15 PROCEDURE — 83036 HEMOGLOBIN GLYCOSYLATED A1C: CPT

## 2024-02-15 PROCEDURE — 36415 COLL VENOUS BLD VENIPUNCTURE: CPT

## 2024-02-15 PROCEDURE — 99285 EMERGENCY DEPT VISIT HI MDM: CPT

## 2024-02-15 PROCEDURE — 96376 TX/PRO/DX INJ SAME DRUG ADON: CPT

## 2024-02-15 PROCEDURE — 6370000000 HC RX 637 (ALT 250 FOR IP)

## 2024-02-15 PROCEDURE — 71045 X-RAY EXAM CHEST 1 VIEW: CPT

## 2024-02-15 RX ORDER — GABAPENTIN 300 MG/1
300 CAPSULE ORAL DAILY
Status: DISCONTINUED | OUTPATIENT
Start: 2024-02-15 | End: 2024-02-16 | Stop reason: HOSPADM

## 2024-02-15 RX ORDER — MORPHINE SULFATE 4 MG/ML
4 INJECTION, SOLUTION INTRAMUSCULAR; INTRAVENOUS
Status: DISCONTINUED | OUTPATIENT
Start: 2024-02-15 | End: 2024-02-16 | Stop reason: HOSPADM

## 2024-02-15 RX ORDER — SODIUM CHLORIDE 0.9 % (FLUSH) 0.9 %
5-40 SYRINGE (ML) INJECTION PRN
Status: DISCONTINUED | OUTPATIENT
Start: 2024-02-15 | End: 2024-02-16 | Stop reason: HOSPADM

## 2024-02-15 RX ORDER — POTASSIUM CHLORIDE 7.45 MG/ML
10 INJECTION INTRAVENOUS PRN
Status: DISCONTINUED | OUTPATIENT
Start: 2024-02-15 | End: 2024-02-16 | Stop reason: HOSPADM

## 2024-02-15 RX ORDER — POTASSIUM CHLORIDE 20 MEQ/1
40 TABLET, EXTENDED RELEASE ORAL PRN
Status: DISCONTINUED | OUTPATIENT
Start: 2024-02-15 | End: 2024-02-16 | Stop reason: HOSPADM

## 2024-02-15 RX ORDER — TIMOLOL MALEATE 5 MG/ML
1 SOLUTION/ DROPS OPHTHALMIC 2 TIMES DAILY
Status: DISCONTINUED | OUTPATIENT
Start: 2024-02-15 | End: 2024-02-16 | Stop reason: HOSPADM

## 2024-02-15 RX ORDER — POLYETHYLENE GLYCOL 3350 17 G/17G
17 POWDER, FOR SOLUTION ORAL DAILY PRN
Status: DISCONTINUED | OUTPATIENT
Start: 2024-02-15 | End: 2024-02-16 | Stop reason: HOSPADM

## 2024-02-15 RX ORDER — SODIUM CHLORIDE 9 MG/ML
INJECTION, SOLUTION INTRAVENOUS PRN
Status: DISCONTINUED | OUTPATIENT
Start: 2024-02-15 | End: 2024-02-16 | Stop reason: HOSPADM

## 2024-02-15 RX ORDER — FOLIC ACID 1 MG/1
1 TABLET ORAL DAILY
Status: DISCONTINUED | OUTPATIENT
Start: 2024-02-15 | End: 2024-02-16 | Stop reason: HOSPADM

## 2024-02-15 RX ORDER — AMLODIPINE BESYLATE 5 MG/1
2.5 TABLET ORAL DAILY
Status: DISCONTINUED | OUTPATIENT
Start: 2024-02-15 | End: 2024-02-16 | Stop reason: HOSPADM

## 2024-02-15 RX ORDER — ATORVASTATIN CALCIUM 40 MG/1
40 TABLET, FILM COATED ORAL DAILY
Status: DISCONTINUED | OUTPATIENT
Start: 2024-02-15 | End: 2024-02-16 | Stop reason: HOSPADM

## 2024-02-15 RX ORDER — ALBUTEROL SULFATE 2.5 MG/3ML
2.5 SOLUTION RESPIRATORY (INHALATION)
Status: DISCONTINUED | OUTPATIENT
Start: 2024-02-15 | End: 2024-02-15

## 2024-02-15 RX ORDER — NITROGLYCERIN 0.4 MG/1
0.4 TABLET SUBLINGUAL EVERY 5 MIN PRN
Status: DISCONTINUED | OUTPATIENT
Start: 2024-02-15 | End: 2024-02-16 | Stop reason: HOSPADM

## 2024-02-15 RX ORDER — SODIUM CHLORIDE 0.9 % (FLUSH) 0.9 %
5-40 SYRINGE (ML) INJECTION EVERY 12 HOURS SCHEDULED
Status: DISCONTINUED | OUTPATIENT
Start: 2024-02-15 | End: 2024-02-16 | Stop reason: HOSPADM

## 2024-02-15 RX ORDER — PANTOPRAZOLE SODIUM 40 MG/1
40 TABLET, DELAYED RELEASE ORAL DAILY
Status: DISCONTINUED | OUTPATIENT
Start: 2024-02-15 | End: 2024-02-16 | Stop reason: HOSPADM

## 2024-02-15 RX ORDER — ASPIRIN 81 MG/1
81 TABLET ORAL DAILY
Status: DISCONTINUED | OUTPATIENT
Start: 2024-02-16 | End: 2024-02-16 | Stop reason: HOSPADM

## 2024-02-15 RX ORDER — ALBUTEROL SULFATE 2.5 MG/3ML
2.5 SOLUTION RESPIRATORY (INHALATION) EVERY 4 HOURS PRN
Status: DISCONTINUED | OUTPATIENT
Start: 2024-02-15 | End: 2024-02-16 | Stop reason: HOSPADM

## 2024-02-15 RX ORDER — ASPIRIN 81 MG/1
324 TABLET, CHEWABLE ORAL ONCE
Status: COMPLETED | OUTPATIENT
Start: 2024-02-15 | End: 2024-02-15

## 2024-02-15 RX ORDER — ONDANSETRON 4 MG/1
4 TABLET, ORALLY DISINTEGRATING ORAL EVERY 8 HOURS PRN
Status: DISCONTINUED | OUTPATIENT
Start: 2024-02-15 | End: 2024-02-16 | Stop reason: HOSPADM

## 2024-02-15 RX ORDER — ACETAMINOPHEN 650 MG/1
650 SUPPOSITORY RECTAL EVERY 6 HOURS PRN
Status: DISCONTINUED | OUTPATIENT
Start: 2024-02-15 | End: 2024-02-16 | Stop reason: HOSPADM

## 2024-02-15 RX ORDER — MORPHINE SULFATE 2 MG/ML
2 INJECTION, SOLUTION INTRAMUSCULAR; INTRAVENOUS
Status: DISCONTINUED | OUTPATIENT
Start: 2024-02-15 | End: 2024-02-16 | Stop reason: HOSPADM

## 2024-02-15 RX ORDER — ONDANSETRON 2 MG/ML
4 INJECTION INTRAMUSCULAR; INTRAVENOUS EVERY 6 HOURS PRN
Status: DISCONTINUED | OUTPATIENT
Start: 2024-02-15 | End: 2024-02-16 | Stop reason: HOSPADM

## 2024-02-15 RX ORDER — ENOXAPARIN SODIUM 100 MG/ML
40 INJECTION SUBCUTANEOUS DAILY
Status: DISCONTINUED | OUTPATIENT
Start: 2024-02-15 | End: 2024-02-16 | Stop reason: HOSPADM

## 2024-02-15 RX ORDER — MAGNESIUM SULFATE IN WATER 40 MG/ML
2000 INJECTION, SOLUTION INTRAVENOUS PRN
Status: DISCONTINUED | OUTPATIENT
Start: 2024-02-15 | End: 2024-02-16 | Stop reason: HOSPADM

## 2024-02-15 RX ORDER — MORPHINE SULFATE 4 MG/ML
4 INJECTION, SOLUTION INTRAMUSCULAR; INTRAVENOUS ONCE
Status: COMPLETED | OUTPATIENT
Start: 2024-02-15 | End: 2024-02-15

## 2024-02-15 RX ORDER — LATANOPROST 50 UG/ML
1 SOLUTION/ DROPS OPHTHALMIC NIGHTLY
Status: DISCONTINUED | OUTPATIENT
Start: 2024-02-15 | End: 2024-02-16 | Stop reason: HOSPADM

## 2024-02-15 RX ORDER — EZETIMIBE 10 MG/1
10 TABLET ORAL DAILY
Status: DISCONTINUED | OUTPATIENT
Start: 2024-02-15 | End: 2024-02-16 | Stop reason: HOSPADM

## 2024-02-15 RX ORDER — ACETAMINOPHEN 325 MG/1
650 TABLET ORAL EVERY 6 HOURS PRN
Status: DISCONTINUED | OUTPATIENT
Start: 2024-02-15 | End: 2024-02-16 | Stop reason: HOSPADM

## 2024-02-15 RX ORDER — ROPINIROLE 1 MG/1
1 TABLET, FILM COATED ORAL EVERY EVENING
Status: DISCONTINUED | OUTPATIENT
Start: 2024-02-15 | End: 2024-02-16 | Stop reason: HOSPADM

## 2024-02-15 RX ADMIN — ROPINIROLE HYDROCHLORIDE 1 MG: 1 TABLET, FILM COATED ORAL at 20:07

## 2024-02-15 RX ADMIN — ASPIRIN 324 MG: 81 TABLET, CHEWABLE ORAL at 09:15

## 2024-02-15 RX ADMIN — MORPHINE SULFATE 4 MG: 4 INJECTION, SOLUTION INTRAMUSCULAR; INTRAVENOUS at 10:29

## 2024-02-15 RX ADMIN — GABAPENTIN 300 MG: 300 CAPSULE ORAL at 22:14

## 2024-02-15 RX ADMIN — MORPHINE SULFATE 2 MG: 2 INJECTION, SOLUTION INTRAMUSCULAR; INTRAVENOUS at 16:37

## 2024-02-15 RX ADMIN — SODIUM CHLORIDE, PRESERVATIVE FREE 10 ML: 5 INJECTION INTRAVENOUS at 20:10

## 2024-02-15 RX ADMIN — MORPHINE SULFATE 4 MG: 4 INJECTION, SOLUTION INTRAMUSCULAR; INTRAVENOUS at 19:40

## 2024-02-15 RX ADMIN — ENOXAPARIN SODIUM 40 MG: 100 INJECTION SUBCUTANEOUS at 15:35

## 2024-02-15 RX ADMIN — SODIUM CHLORIDE, PRESERVATIVE FREE 10 ML: 5 INJECTION INTRAVENOUS at 15:35

## 2024-02-15 RX ADMIN — AMLODIPINE BESYLATE 2.5 MG: 5 TABLET ORAL at 20:07

## 2024-02-15 ASSESSMENT — PAIN DESCRIPTION - ONSET: ONSET: SUDDEN

## 2024-02-15 ASSESSMENT — PAIN DESCRIPTION - ORIENTATION
ORIENTATION: RIGHT

## 2024-02-15 ASSESSMENT — PAIN DESCRIPTION - FREQUENCY
FREQUENCY: INTERMITTENT
FREQUENCY: INTERMITTENT

## 2024-02-15 ASSESSMENT — PAIN - FUNCTIONAL ASSESSMENT
PAIN_FUNCTIONAL_ASSESSMENT: ACTIVITIES ARE NOT PREVENTED
PAIN_FUNCTIONAL_ASSESSMENT: 0-10

## 2024-02-15 ASSESSMENT — PAIN SCALES - GENERAL
PAINLEVEL_OUTOF10: 5
PAINLEVEL_OUTOF10: 10
PAINLEVEL_OUTOF10: 7
PAINLEVEL_OUTOF10: 8
PAINLEVEL_OUTOF10: 0
PAINLEVEL_OUTOF10: 2
PAINLEVEL_OUTOF10: 6

## 2024-02-15 ASSESSMENT — PAIN DESCRIPTION - PAIN TYPE
TYPE: ACUTE PAIN

## 2024-02-15 ASSESSMENT — PAIN DESCRIPTION - LOCATION
LOCATION: CHEST

## 2024-02-15 ASSESSMENT — LIFESTYLE VARIABLES
HOW MANY STANDARD DRINKS CONTAINING ALCOHOL DO YOU HAVE ON A TYPICAL DAY: 1 OR 2
HOW OFTEN DO YOU HAVE A DRINK CONTAINING ALCOHOL: MONTHLY OR LESS

## 2024-02-15 ASSESSMENT — HEART SCORE: ECG: 0

## 2024-02-15 ASSESSMENT — PAIN DESCRIPTION - DESCRIPTORS
DESCRIPTORS: ACHING;DISCOMFORT
DESCRIPTORS: TIGHTNESS
DESCRIPTORS: SHARP

## 2024-02-15 NOTE — FLOWSHEET NOTE
Cardiology testing states they will not be able to do patients stress test today. Will do an echocardiogram at bedside today. Patient states # 3 right chest discomfort. Asks for more Morphine . Morphine given in ER. Dr Mckeon called for a diet order and something for pain.

## 2024-02-15 NOTE — CONSULTS
Patient: Eli Marquez  : 1950  Date of Admission: 2/15/2024  Primary Care Physician: Pramod Hastings  Today's Date: 2/15/2024    REASON FOR CONSULTATION/CC: Chest discomfort     HPI: I had the pleasure of seeing Ms. Marquez today who is a 73 y.o. female with a history of intermittent chest discomfort leading to this consultation.     Ms. Marquez is a 73 y.o. female with a history of coronary artery disease with a total of 4 stents, most recently in 2012. In 10/12 she had a repeat heart catheterization with a bit of a complication including severe chest pain after a left ventriculogram but this resolved. Ms. Marquez underwent a repeat stress test in  which was abnormal and on her heart catheterization done 2018 she developed chest pressure as well as ST changes on left main engagement suspicious for coronary vasospasm. Her echocardiogram done 2018 shows moderate aortic regurgitation. Echo done on 2021 showed ejection fraction of >55%. The left ventricular cavity size is within normal limits and the left ventricular wall thickness is mildly increased. No definite specific wall motion abnormalities were identified. Aortic valve is sclerotic but opens well. Mild to moderate aortic regurgitation. Evidence of mild (grade I) diastolic dysfunction is seen. An anterior echo free space is seen suggestive of a small effusion or fat Pad. Stress test done on 2021 was largely abnormal, most consistent with low risk for ischemia. Ms. Marquez had recent heart cath on 2022 at [facility] EF 55-60% No significant CAD. Patent right coronary artery and cx stents. Echo done on 10/26/2022 compared to the previous study of 21, the patients aortic regurgitation may have advanced from mild to moderate to at least moderate without significant LV dilation.    Echo done today, 2/15/2024: Left Ventricle: Normal left ventricular systolic function with a visually estimated EF of 55 - 60%. Left  ventricle size is normal. Normal wall thickness. Normal wall motion. Grade I diastolic dysfunction with normal LAP. Aortic Valve: Mild to moderate regurgitation. Mitral Valve: Mildly calcified leaflet, at the posterior leaflet. Mild regurgitation. Image quality is adequate.    Ms. Marquez came to the ER this morning due to chest pain. The chest pain began yesterday, she describes it as a tightness and sharp pain. It is located on the right side. The pain lasts constantly, but it is intermittent. She reports she was up almost all night with the chest pain and it hadn't resolved in the morning. It radiates through to her back. She denies any nausea or vomiting. No sweating. She reports she was very lightheaded, in the past she had been on Zetia and it caused lightheadedness as well. Today she has had morphine and it helps with the pain. Nothing makes the pain worse, she has it even with sitting. She denies any change in her chronic SOB due to COPD. NO syncopal episodes. No palpitations. She has chronic constipation.    She denied any heart palpations, lightheaded or dizziness. She also denied any shortness of breath at this time. No bleeding problems, bowel issues, problems with her medications or any other concerns at this time. No cough, fever or chills. No abdominal pain, nausea or vomiting.     CXR today was normal.  Troponin: Not elevated x 3   BP has been elevated today.    Bleeding Risks: Ms. Marquez denies any current or recent bleeding problems including a history of a GI bleed, ulcers, recent or upcoming surgeries, blood in her stool or black tarry stools or blood in her urine.    Past Medical History:   Diagnosis Date    Arthritis     CAD (coronary artery disease) 4/11, 9/12    4 total stents. 2 Stents in RCA, 1 in small OM1.    COPD (chronic obstructive pulmonary disease) ([facility])     T pulmonary    H/O cardiovascular stress test 5/22/13, 6/25/14    Relatively normal without evidence of significant ischemia

## 2024-02-15 NOTE — ED PROVIDER NOTES
[facility] ED  Emergency Department Encounter  Emergency Medicine Attending     Pt Name:Eli Marquez  MRN: [id number]  Birthdate 1950  Date of evaluation: 2/15/24  PCP:  Pramod Hastings APRN - CNP  Note Started: 8:38 AM EST      CHIEF COMPLAINT       Chief Complaint   Patient presents with    Chest Pain     Pain present in R chest in pm. \"Awake most of the night\", hx of dizziness       HISTORY OF PRESENT ILLNESS  (Location/Symptom, Timing/Onset, Context/Setting, Quality, Duration, Modifying Factors, Severity.)      Eli Marquez is a 73 y.o. female who presents with history of CAD, COPD, hyperlipidemia for evaluation of chest pain.  Patient states her chest pain started yesterday in the right chest.  She took a nitro with some improvement of her pain yesterday.  It improved but then she noticed that throughout the night which kept her up.  She took 3 nitroglycerin this morning with no improvement of her symptoms.  She states that it intermittently worsens and resolved spontaneously.  The pain is reproducible with palpation she states.  She states over the last few days she has been increasing activity with \"scrubbing \"in the home.  Denies shortness of breath.  Denies any infectious symptoms such as fevers, chills, cough, congestion.    PAST MEDICAL / SURGICAL / SOCIAL / FAMILY HISTORY      has a past medical history of Arthritis, CAD (coronary artery disease), COPD (chronic obstructive pulmonary disease) ([facility]), H/O cardiovascular stress test, H/O cardiovascular stress test, H/O echocardiogram, History of echocardiogram, Hx of cardiac catheterization, Hx of heart artery stent, Hx of percutaneous left heart catheterization, Hx of percutaneous left heart catheterization, Hx of percutaneous left heart catheterization, Hyperlipidemia, Idiopathic peripheral neuropathy, Normal nuclear stress test, Rheumatoid arthritis involving multiple sites with positive rheumatoid factor (HCC), and  ordered and reviewed by me.  Troponins were normal x 2.  Rest of lab work unremarkable.  Patient was reevaluated and still had pain.  Was given morphine with improvement of her pain.  Her blood pressure did improve.  She has moderate risk heart score.  I discussed patient's case with her cardiologist he does agree with recommendation for admission for stress test.  Patient is agreeable to this plan.  Discussed patient's case with hospitalist who accepts admission.    Amount and/or Complexity of Data Reviewed  Labs: ordered.  Radiology: ordered.  ECG/medicine tests: ordered.    Risk  OTC drugs.  Prescription drug management.  Decision regarding hospitalization.        History: 1  EC  Patient Age: 2  *Risk factors for Atherosclerotic disease: Coronary Artery Disease; Obesity; Hypercholesterolemia  Risk Factors: 2  Troponin: 0  Heart Score Total: 5  .  EKG  EKG reviewed and interpreted by me showing sinus rhythm with premature atrial complexes with ventricular rate of 79 bpm.  Normal intervals.  Normal axis.  No STEMI.    All EKG's are interpreted by the Emergency Department Physician who either signs or Co-signs this chart in the absence of a cardiologist.    RADIOLOGY:     X-ray reviewed and interpreted by me with no evidence of pneumonia, otherwise per radiology    Interpretation per the Radiologist below, if available at the time of this note:    XR CHEST PORTABLE   Final Result   No acute process.               EMERGENCY DEPARTMENT COURSE:      ED Course as of 02/15/24 1142   Thu Feb 15, 2024   1000 I did update patient with her current results.  She states that the aspirin did not improve her pain and she continues to have right-sided chest pain.  She remains hypertensive.  I will proceed to give patient morphine at this time.  We will repeat troponin at 2-hour braulio.  Dispo pending. [SS]   111 I discussed patient's case with Dr. Farrell cardiology.  He does agree with admission for patient to receive stress

## 2024-02-15 NOTE — CARE COORDINATION
Case Management Assessment  Initial Evaluation    Date/Time of Evaluation: 2/15/2024 1:57 PM  Assessment Completed by: Effie Ashley MSW LSW     If patient is discharged prior to next notation, then this note serves as note for discharge by case management.    Patient Name: Eli Marquez                   YOB: 1950  Diagnosis: Chest wall pain [R07.89]  Hypertensive urgency [I16.0]  Chest pain in adult [R07.9]  Chest pain, unspecified type [R07.9]                   Date / Time: 2/15/2024  8:28 AM    Patient Admission Status: Observation   Readmission Risk (Low < 19, Mod (19-27), High > 27): Readmission Risk Score: 8.5    Current PCP: Pramod Hastings APRN - CNP  PCP verified by CM? Yes    Chart Reviewed: Yes      History Provided by: Patient  Patient Orientation: Alert and Oriented, Person, Place, Situation    Patient Cognition: Alert    Hospitalization in the last 30 days (Readmission):  No    If yes, Readmission Assessment in  Navigator will be completed.    Advance Directives:      Code Status: Full Code   Patient's Primary Decision Maker is: Legal Next of Kin    Primary Decision Maker: Kaz Marquez - Spouse - [phone]    Discharge Planning:    Patient lives with: Spouse/Significant Other Type of Home: House  Primary Care Giver: Self  Patient Support Systems include: Spouse/Significant Other, Children   Current Financial resources: Medicare  Current community resources: None  Current services prior to admission: None            Current DME:  None            Type of Home Care services:  None    ADLS  Prior functional level: Independent in ADLs/IADLs  Current functional level: Independent in ADLs/IADLs    PT AM-PAC:   /24  OT AM-PAC:   /24    Family can provide assistance at DC: Yes  Would you like Case Management to discuss the discharge plan with any other family members/significant others, and if so, who? No  Plans to Return to Present Housing: Yes  Other Identified  Issues/Barriers to RETURNING to current housing: None  Potential Assistance needed at discharge: N/A            Potential DME:  None  Patient expects to discharge to: House  Plan for transportation at discharge: Family    Financial    Payor: MEDICARE / Plan: MEDICARE PART A AND B / Product Type: *No Product type* /     Does insurance require precert for SNF: No    Potential assistance Purchasing Medications: No        [facility] [address] [phone] - F [phone]  [address] [facility] [address]  Phone: [phone] Fax: [phone]    [facility] [id number] [facility] [address] [phone] - F [phone]  [address] [facility] [address]  Phone: [phone] Fax: [phone]      Notes:    Factors facilitating achievement of predicted outcomes: Family support, Cooperative, and Pleasant    Barriers to discharge: Decreased endurance    Additional Case Management Notes: SW met with pt to complete assessment. Pt is alert and oriented and pleasant with assessment. Pt is a 73 year old  female admitted for chest pain. Pt lives with her spouse in their home in [place]. Pt uses no DME or community services and is independent at home. Pt drives and is able to get to appointments without concerns.     Pt is a full code and follows with Pramod Hastings CNP as PCP. Pt states that she has advance directives and copy requested. Pt's spouse is her decision maker if needed. Pt reports that her medications are affordable with insurance. Pt plans to return home with spouse at discharge. Pt identifies no discharge needs or concerns. SW will follow and remain available as needed. Effie Ashley MSW LSW 2/15/2024     Transportation/ housing/ food security: No issues identified.     The Plan for Transition of Care is related to the following treatment goals of Chest wall pain [R07.89]  Hypertensive urgency [I16.0]  Chest pain in adult

## 2024-02-15 NOTE — FLOWSHEET NOTE
73 year old year female admitted to room 315 per ER stretcher. To bed independently.steady on feet. Denies dizziness.  Oriented to room and call light system.NPO for a stress test today.  Call light within reach.

## 2024-02-15 NOTE — ED TRIAGE NOTES
Pt c/o pain present in R chest in pm. \"Awake most of the night\", hx of dizziness\" unsteady\",  Pt took 3 Nitro SL at 0645

## 2024-02-16 ENCOUNTER — APPOINTMENT (OUTPATIENT)
Age: 74
End: 2024-02-16
Payer: MEDICARE

## 2024-02-16 ENCOUNTER — APPOINTMENT (OUTPATIENT)
Dept: NUCLEAR MEDICINE | Age: 74
End: 2024-02-16
Payer: MEDICARE

## 2024-02-16 ENCOUNTER — APPOINTMENT (OUTPATIENT)
Dept: CT IMAGING | Age: 74
End: 2024-02-16
Payer: MEDICARE

## 2024-02-16 VITALS
BODY MASS INDEX: 23.48 KG/M2 | TEMPERATURE: 97.8 F | HEIGHT: 63 IN | DIASTOLIC BLOOD PRESSURE: 85 MMHG | SYSTOLIC BLOOD PRESSURE: 156 MMHG | WEIGHT: 132.5 LBS | HEART RATE: 79 BPM | OXYGEN SATURATION: 97 % | RESPIRATION RATE: 16 BRPM

## 2024-02-16 LAB
ANION GAP SERPL CALCULATED.3IONS-SCNC: 10 MMOL/L (ref 9–17)
BASOPHILS # BLD: 0.04 K/UL (ref 0–0.2)
BASOPHILS NFR BLD: 1 % (ref 0–2)
BUN SERPL-MCNC: 11 MG/DL (ref 8–23)
BUN/CREAT SERPL: 18 (ref 9–20)
CALCIUM SERPL-MCNC: 9 MG/DL (ref 8.6–10.4)
CHLORIDE SERPL-SCNC: 102 MMOL/L (ref 98–107)
CHOLEST SERPL-MCNC: 188 MG/DL (ref 0–199)
CHOLESTEROL/HDL RATIO: 3
CO2 SERPL-SCNC: 27 MMOL/L (ref 20–31)
CREAT SERPL-MCNC: 0.6 MG/DL (ref 0.5–0.9)
ECHO BSA: 1.63 M2
EOSINOPHIL # BLD: 0.24 K/UL (ref 0–0.44)
EOSINOPHILS RELATIVE PERCENT: 5 % (ref 1–4)
ERYTHROCYTE [DISTWIDTH] IN BLOOD BY AUTOMATED COUNT: 12.8 % (ref 11.8–14.4)
EST. AVERAGE GLUCOSE BLD GHB EST-MCNC: 100 MG/DL
GFR SERPL CREATININE-BSD FRML MDRD: >60 ML/MIN/1.73M2
GLUCOSE SERPL-MCNC: 93 MG/DL (ref 70–99)
HBA1C MFR BLD: 5.1 % (ref 4–6)
HCT VFR BLD AUTO: 39 % (ref 36.3–47.1)
HDLC SERPL-MCNC: 57 MG/DL
HGB BLD-MCNC: 12.8 G/DL (ref 11.9–15.1)
IMM GRANULOCYTES # BLD AUTO: <0.03 K/UL (ref 0–0.3)
IMM GRANULOCYTES NFR BLD: 0 %
LDLC SERPL CALC-MCNC: 94 MG/DL (ref 0–100)
LYMPHOCYTES NFR BLD: 2.32 K/UL (ref 1.1–3.7)
LYMPHOCYTES RELATIVE PERCENT: 50 % (ref 24–43)
MCH RBC QN AUTO: 31.1 PG (ref 25.2–33.5)
MCHC RBC AUTO-ENTMCNC: 32.8 G/DL (ref 28.4–34.8)
MCV RBC AUTO: 94.9 FL (ref 82.6–102.9)
MONOCYTES NFR BLD: 0.49 K/UL (ref 0.1–1.2)
MONOCYTES NFR BLD: 11 % (ref 3–12)
NEUTROPHILS NFR BLD: 33 % (ref 36–65)
NEUTS SEG NFR BLD: 1.54 K/UL (ref 1.5–8.1)
NRBC BLD-RTO: 0 PER 100 WBC
NUC STRESS EJECTION FRACTION: 77 %
PLATELET # BLD AUTO: 236 K/UL (ref 138–453)
PMV BLD AUTO: 8.7 FL (ref 8.1–13.5)
POTASSIUM SERPL-SCNC: 4.3 MMOL/L (ref 3.7–5.3)
RBC # BLD AUTO: 4.11 M/UL (ref 3.95–5.11)
SODIUM SERPL-SCNC: 139 MMOL/L (ref 135–144)
STRESS BASELINE DIAS BP: 86 MMHG
STRESS BASELINE HR: 71 BPM
STRESS BASELINE SYS BP: 152 MMHG
STRESS PEAK DIAS BP: 92 MMHG
STRESS PEAK SYS BP: 168 MMHG
STRESS PERCENT HR ACHIEVED: 65 %
STRESS POST PEAK HR: 96 BPM
STRESS RATE PRESSURE PRODUCT: NORMAL BPM*MMHG
STRESS STAGE RECOVERY 1 BP: NORMAL MMHG
STRESS STAGE RECOVERY 1 DURATION: 0 MIN:SEC
STRESS STAGE RECOVERY 1 HR: 93 BPM
STRESS STAGE RECOVERY 2 DURATION: 1 MIN:SEC
STRESS STAGE RECOVERY 2 HR: 93 BPM
STRESS STAGE RECOVERY 3 BP: NORMAL MMHG
STRESS STAGE RECOVERY 3 DURATION: 3 MIN:SEC
STRESS STAGE RECOVERY 3 HR: 96 BPM
STRESS STAGE RECOVERY 4 BP: NORMAL MMHG
STRESS STAGE RECOVERY 4 DURATION: 5 MIN:SEC
STRESS STAGE RECOVERY 4 HR: 90 BPM
STRESS TARGET HR: 147 BPM
TID: 1.01
TRIGL SERPL-MCNC: 184 MG/DL
VLDLC SERPL CALC-MCNC: 37 MG/DL
WBC OTHER # BLD: 4.6 K/UL (ref 3.5–11.3)

## 2024-02-16 PROCEDURE — 6370000000 HC RX 637 (ALT 250 FOR IP): Performed by: INTERNAL MEDICINE

## 2024-02-16 PROCEDURE — 93018 CV STRESS TEST I&R ONLY: CPT | Performed by: INTERNAL MEDICINE

## 2024-02-16 PROCEDURE — 6370000000 HC RX 637 (ALT 250 FOR IP): Performed by: STUDENT IN AN ORGANIZED HEALTH CARE EDUCATION/TRAINING PROGRAM

## 2024-02-16 PROCEDURE — 6370000000 HC RX 637 (ALT 250 FOR IP): Performed by: PHYSICIAN ASSISTANT

## 2024-02-16 PROCEDURE — G0378 HOSPITAL OBSERVATION PER HR: HCPCS

## 2024-02-16 PROCEDURE — 93016 CV STRESS TEST SUPVJ ONLY: CPT | Performed by: INTERNAL MEDICINE

## 2024-02-16 PROCEDURE — 78452 HT MUSCLE IMAGE SPECT MULT: CPT | Performed by: INTERNAL MEDICINE

## 2024-02-16 PROCEDURE — 94761 N-INVAS EAR/PLS OXIMETRY MLT: CPT

## 2024-02-16 PROCEDURE — 2580000003 HC RX 258: Performed by: STUDENT IN AN ORGANIZED HEALTH CARE EDUCATION/TRAINING PROGRAM

## 2024-02-16 PROCEDURE — 80048 BASIC METABOLIC PNL TOTAL CA: CPT

## 2024-02-16 PROCEDURE — 85025 COMPLETE CBC W/AUTO DIFF WBC: CPT

## 2024-02-16 PROCEDURE — 99232 SBSQ HOSP IP/OBS MODERATE 35: CPT | Performed by: PHYSICIAN ASSISTANT

## 2024-02-16 PROCEDURE — 71250 CT THORAX DX C-: CPT

## 2024-02-16 PROCEDURE — 80061 LIPID PANEL: CPT

## 2024-02-16 PROCEDURE — 96375 TX/PRO/DX INJ NEW DRUG ADDON: CPT

## 2024-02-16 PROCEDURE — 96376 TX/PRO/DX INJ SAME DRUG ADON: CPT

## 2024-02-16 PROCEDURE — A9500 TC99M SESTAMIBI: HCPCS | Performed by: STUDENT IN AN ORGANIZED HEALTH CARE EDUCATION/TRAINING PROGRAM

## 2024-02-16 PROCEDURE — 3430000000 HC RX DIAGNOSTIC RADIOPHARMACEUTICAL: Performed by: STUDENT IN AN ORGANIZED HEALTH CARE EDUCATION/TRAINING PROGRAM

## 2024-02-16 PROCEDURE — 6360000002 HC RX W HCPCS: Performed by: INTERNAL MEDICINE

## 2024-02-16 PROCEDURE — 6360000002 HC RX W HCPCS: Performed by: STUDENT IN AN ORGANIZED HEALTH CARE EDUCATION/TRAINING PROGRAM

## 2024-02-16 PROCEDURE — 78452 HT MUSCLE IMAGE SPECT MULT: CPT

## 2024-02-16 PROCEDURE — 36415 COLL VENOUS BLD VENIPUNCTURE: CPT

## 2024-02-16 RX ORDER — TETRAKIS(2-METHOXYISOBUTYLISOCYANIDE)COPPER(I) TETRAFLUOROBORATE 1 MG/ML
30 INJECTION, POWDER, LYOPHILIZED, FOR SOLUTION INTRAVENOUS
Status: COMPLETED | OUTPATIENT
Start: 2024-02-16 | End: 2024-02-16

## 2024-02-16 RX ORDER — AMLODIPINE BESYLATE 2.5 MG/1
2.5 TABLET ORAL DAILY
Qty: 30 TABLET | Refills: 3 | Status: SHIPPED | OUTPATIENT
Start: 2024-02-17 | End: 2024-02-20 | Stop reason: SINTOL

## 2024-02-16 RX ORDER — TIZANIDINE 4 MG/1
4 TABLET ORAL 2 TIMES DAILY PRN
Status: DISCONTINUED | OUTPATIENT
Start: 2024-02-16 | End: 2024-02-16 | Stop reason: HOSPADM

## 2024-02-16 RX ORDER — REGADENOSON 0.08 MG/ML
0.4 INJECTION, SOLUTION INTRAVENOUS
Status: COMPLETED | OUTPATIENT
Start: 2024-02-16 | End: 2024-02-16

## 2024-02-16 RX ORDER — TETRAKIS(2-METHOXYISOBUTYLISOCYANIDE)COPPER(I) TETRAFLUOROBORATE 1 MG/ML
10 INJECTION, POWDER, LYOPHILIZED, FOR SOLUTION INTRAVENOUS
Status: COMPLETED | OUTPATIENT
Start: 2024-02-16 | End: 2024-02-16

## 2024-02-16 RX ORDER — KETOROLAC TROMETHAMINE 15 MG/ML
15 INJECTION, SOLUTION INTRAMUSCULAR; INTRAVENOUS ONCE
Status: COMPLETED | OUTPATIENT
Start: 2024-02-16 | End: 2024-02-16

## 2024-02-16 RX ORDER — TIZANIDINE 4 MG/1
4 TABLET ORAL 2 TIMES DAILY PRN
Qty: 30 TABLET | Refills: 0 | Status: SHIPPED | OUTPATIENT
Start: 2024-02-16 | End: 2024-02-20 | Stop reason: ALTCHOICE

## 2024-02-16 RX ADMIN — ATORVASTATIN CALCIUM 40 MG: 40 TABLET, FILM COATED ORAL at 09:55

## 2024-02-16 RX ADMIN — TIMOLOL MALEATE 1 DROP: 5 SOLUTION OPHTHALMIC at 09:55

## 2024-02-16 RX ADMIN — REGADENOSON 0.4 MG: 0.08 INJECTION, SOLUTION INTRAVENOUS at 11:30

## 2024-02-16 RX ADMIN — MORPHINE SULFATE 4 MG: 4 INJECTION, SOLUTION INTRAMUSCULAR; INTRAVENOUS at 07:38

## 2024-02-16 RX ADMIN — FOLIC ACID 1 MG: 1 TABLET ORAL at 09:56

## 2024-02-16 RX ADMIN — AMLODIPINE BESYLATE 2.5 MG: 5 TABLET ORAL at 09:56

## 2024-02-16 RX ADMIN — Medication 10 MILLICURIE: at 11:19

## 2024-02-16 RX ADMIN — KETOROLAC TROMETHAMINE 15 MG: 15 INJECTION, SOLUTION INTRAMUSCULAR; INTRAVENOUS at 07:43

## 2024-02-16 RX ADMIN — TIZANIDINE 4 MG: 4 TABLET ORAL at 12:48

## 2024-02-16 RX ADMIN — Medication 30 MILLICURIE: at 11:19

## 2024-02-16 RX ADMIN — EZETIMIBE 10 MG: 10 TABLET ORAL at 09:55

## 2024-02-16 RX ADMIN — ASPIRIN 81 MG: 81 TABLET, COATED ORAL at 09:56

## 2024-02-16 RX ADMIN — PANTOPRAZOLE SODIUM 40 MG: 40 TABLET, DELAYED RELEASE ORAL at 09:56

## 2024-02-16 RX ADMIN — ONDANSETRON 4 MG: 2 INJECTION INTRAMUSCULAR; INTRAVENOUS at 08:45

## 2024-02-16 RX ADMIN — MORPHINE SULFATE 4 MG: 4 INJECTION, SOLUTION INTRAMUSCULAR; INTRAVENOUS at 04:34

## 2024-02-16 RX ADMIN — SODIUM CHLORIDE, PRESERVATIVE FREE 10 ML: 5 INJECTION INTRAVENOUS at 09:56

## 2024-02-16 RX ADMIN — ONDANSETRON 4 MG: 4 TABLET, ORALLY DISINTEGRATING ORAL at 13:36

## 2024-02-16 ASSESSMENT — PAIN SCALES - GENERAL
PAINLEVEL_OUTOF10: 8
PAINLEVEL_OUTOF10: 5
PAINLEVEL_OUTOF10: 8
PAINLEVEL_OUTOF10: 8
PAINLEVEL_OUTOF10: 4
PAINLEVEL_OUTOF10: 8

## 2024-02-16 ASSESSMENT — PAIN DESCRIPTION - LOCATION
LOCATION: CHEST
LOCATION: RIB CAGE
LOCATION: BACK;CHEST
LOCATION: CHEST

## 2024-02-16 ASSESSMENT — PAIN DESCRIPTION - DESCRIPTORS
DESCRIPTORS: SORE
DESCRIPTORS: SHARP

## 2024-02-16 ASSESSMENT — PAIN - FUNCTIONAL ASSESSMENT
PAIN_FUNCTIONAL_ASSESSMENT: PREVENTS OR INTERFERES SOME ACTIVE ACTIVITIES AND ADLS
PAIN_FUNCTIONAL_ASSESSMENT: PREVENTS OR INTERFERES SOME ACTIVE ACTIVITIES AND ADLS
PAIN_FUNCTIONAL_ASSESSMENT: ACTIVITIES ARE NOT PREVENTED

## 2024-02-16 ASSESSMENT — PAIN DESCRIPTION - ORIENTATION
ORIENTATION: RIGHT

## 2024-02-16 ASSESSMENT — PAIN DESCRIPTION - PAIN TYPE
TYPE: ACUTE PAIN
TYPE: ACUTE PAIN

## 2024-02-16 ASSESSMENT — PAIN DESCRIPTION - FREQUENCY: FREQUENCY: INTERMITTENT

## 2024-02-16 ASSESSMENT — PAIN DESCRIPTION - ONSET: ONSET: SUDDEN

## 2024-02-16 NOTE — H&P
History and Physical    Patient:  Eli SESAY Speaker  MRN: [id number]    Chief Complaint: Chest pain    History Obtained From:  patient, electronic medical record    PCP: Pramod Hastings APRN - CNP    History of Present Illness:   The patient is a 73 y.o. female who presents with right-sided chest pain.  Patient states the pain started yesterday and radiates into her back.  She has been lightheaded with this.  She been having intermittent chest pain for several weeks and saw her cardiologist.  He had ordered an outpatient stress test however patient not had it done yet.  She states that she has been scrubbing walls for the past 2 days and is unsure if her pain is related to that.  She does complain of shortness of breath however she states has COPD and it does not feeling worse than her normal shortness of breath.  Past medical history includes CAD, COPD, stent placement, hyperlipidemia, and rheumatoid arthritis.  Troponin 9, 9, 10 and 14.  EKG showed regular sinus rhythm with PACs.  No ST changes or T wave abnormality.  Chest x-ray showed no acute process        Past Medical History:        Diagnosis Date    Arthritis     CAD (coronary artery disease) 4/11, 9/12    4 total stents. 2 Stents in RCA, 1 in small OM1.    COPD (chronic obstructive pulmonary disease) ([facility])     MHT pulmonary    H/O cardiovascular stress test 5/22/13, 6/25/14    Relatively normal without evidence of significant ischemia or infarction.  global LV systolic function was normal w/o regional wall motion abnormalities.  No significant EKG evidence of ischemia during monitoring w/o significant associated arrhythmias. Duke score is 5.     H/O cardiovascular stress test 12/28/2017    Larglely normal myocardial perfusion imaging with soft tissue artifact but w/o evidence of significant myocardial ischemia or infarction. results are most consistent with low/intermediate risk for significant CAD    H/O echocardiogram 5/21/13, 6/24/14    EF>55%, mild

## 2024-02-16 NOTE — PROGRESS NOTES
Instructed on objectives and procedure of lexiscan/cardiolite stress test.  
Progress Note    SUBJECTIVE:  FU related to    Still having pleuritic chest pain right side.  No shortness of breath.    OBJECTIVE:    Vitals:   TEMPERATURE:  Current - Temp: 97.8 °F (36.6 °C); Max - Temp  Av.4 °F (36.3 °C)  Min: 96.9 °F (36.1 °C)  Max: 97.8 °F (36.6 °C)  RESPIRATIONS RANGE: Resp  Av.3  Min: 16  Max: 19  PULSE RANGE: Pulse  Av  Min: 72  Max: 75  BLOOD PRESSURE RANGE:  Systolic (24hrs), Av , Min:137 , Max:152   ; Diastolic (24hrs), Av, Min:66, Max:93    PULSE OXIMETRY RANGE: SpO2  Av.8 %  Min: 93 %  Max: 97 %  24HR INTAKE/OUTPUT:    Intake/Output Summary (Last 24 hours) at 2024 1152  Last data filed at 2024 0756  Gross per 24 hour   Intake 960 ml   Output 700 ml   Net 260 ml     -----------------------------------------------------------------  Exam:  General: alert  HEENT: Supple neck & negative  Heart: Regular  Lungs: clear to auscultation bilaterally & no retractions  Abdomen: Normal & soft, No tenderness and BS normal  Extremities:  No edema   Neuro: NonFocal     -----------------------------------------------------------------  Diagnostic Data:  Lab Results   Component Value Date    WBC 4.6 2024    HGB 12.8 2024     2024       Lab Results   Component Value Date    BUN 11 2024    CREATININE 0.6 2024     2024    K 4.3 2024    CALCIUM 9.0 2024     2024    CO2 27 2024    LABGLOM >60 2024       Lab Results   Component Value Date    WBCUA 0 TO 2 2019    RBCUA 0 TO 2 2019    EPITHUA 0 TO 2 2019    LEUKOCYTESUR NEGATIVE 2019    SPECGRAV 1.015 2019    GLUCOSEU NEGATIVE 2019    KETUA NEGATIVE 2019    PROTEINU NEGATIVE 2019    HGBUR TRACE (A) 2019    CASTUA NOT REPORTED 2019    CRYSTUA NOT REPORTED 2019    BACTERIA TRACE (A) 2019    YEAST NOT REPORTED 2019       Lab Results   Component Value Date    
Pt taken down for stress test at this time.   
RESPIRATORY ASSESSMENT PROTOCOL                                                                                              Patient Name: Eli SESAY Speaker Room#: 0315/0315-01 : 1950     Admitting diagnosis: Chest wall pain [R07.89]  Hypertensive urgency [I16.0]  Chest pain in adult [R07.9]  Chest pain, unspecified type [R07.9]       Medical History:   Past Medical History:   Diagnosis Date    Arthritis     CAD (coronary artery disease) ,     4 total stents. 2 Stents in RCA, 1 in small OM1.    COPD (chronic obstructive pulmonary disease) ([facility])     MHT pulmonary    H/O cardiovascular stress test 13, 14    Relatively normal without evidence of significant ischemia or infarction.  global LV systolic function was normal w/o regional wall motion abnormalities.  No significant EKG evidence of ischemia during monitoring w/o significant associated arrhythmias. Duke score is 5.     H/O cardiovascular stress test 2017    Larglely normal myocardial perfusion imaging with soft tissue artifact but w/o evidence of significant myocardial ischemia or infarction. results are most consistent with low/intermediate risk for significant CAD    H/O echocardiogram 13, 14    EF>55%, mild concentric LVH    History of echocardiogram 2018    EF of 60%. evidence of moderate diastolic dysfunction is seen.     Hx of cardiac catheterization 2018    MIld CAD w/o any significant focal stenosis with a normal LV end diastolic pressure LVEDP interestingly the pt developed pretty pronounced ST depression on Lt main engagment with mod chest pressure which resolved o disengagement of the LT main    Hx of heart artery stent 10/06/2011    Lesion of Prox RCA 75% stenosis-Drug Eluting stent to RCA    Hx of percutaneous left heart catheterization 2011    LMCA, LAD & RAMUS- normal, LCX-lesion on Mid CX 40% stenosis, RCA-Patent stent, EF 60%    Hx of percutaneous left heart catheterization 
Reviewed discharge instructions with patient and spouse.  Patient aware of need to  prescriptions.  Reviewed new medications and side effects to monitor for.  Patient aware of date/time of follow up appointments.  Instructed to follow a low sodium diet.  Educational handout on Chest Pain: Musculoskeletal given to patient.  Questions answered.  Verbalizes understanding.  Copy of discharge instructions given to patient.  
Writer at bedside to complete evening assessment. Upon entry to room, pt awake and in bed, respirations normal and unlabored while on room air. Vitals obtained and assessment completed, see flow sheet for details. Pt denies needs from writer at this time. Call light in reach. Care is ongoing.     
Writer to bedside to complete morning assessment. Upon entry to room, pt laying in bed, respirations even and unlabored while on room air. Vitals obtained and assessment completed, see flow sheet for details. PRN morphine given at this time along with a one time dose of Toradol. See MAR for details. Pt denies needs from writer at this time. Call light in reach. Care ongoing.      
4/18/2023  Statin Therapy: Continue atorvastatin (Lipitor) 80 mg nightly.   Continue Zetia 10 mg daily    Once again, thank you for allowing me to participate in this patients care. Please do not hesitate to contact me could I be of further assistance. Outpatient follow up in 2 to 3 weeks.      Sincerely,  Kristyn Garcia PA-C   [facility]iology Specialist, [place] [id number]  Phone: [phone], Fax: [phone]     I believe that the risk of significant morbidity and mortality related to the patient's current medical conditions are: Intermediate.        February 15, 2024

## 2024-02-16 NOTE — DISCHARGE INSTR - DIET
Good nutrition is important when healing from an illness, injury, or surgery.  Follow any nutrition recommendations given to you during your hospital stay.   If you were given an oral nutrition supplement while in the hospital, continue to take this supplement at home.  You can take it with meals, in-between meals, and/or before bedtime. These supplements can be purchased at most local grocery stores, pharmacies, and chain [employer]-stores.   If you have any questions about your diet or nutrition, call the hospital and ask for the dietitian.  Low sodium

## 2024-02-16 NOTE — PLAN OF CARE
Problem: Discharge Planning  Goal: Discharge to home or other facility with appropriate resources  2/16/2024 0527 by Raquel Rutledge RN  Outcome: Progressing  2/16/2024 0527 by Raquel Rutledge RN  Outcome: Progressing     Problem: Pain  Goal: Verbalizes/displays adequate comfort level or baseline comfort level  2/16/2024 0527 by Raquel Rutledge RN  Outcome: Progressing  2/16/2024 0527 by Raquel Rutledge RN  Outcome: Progressing     Problem: Safety - Adult  Goal: Free from fall injury  2/16/2024 0527 by Raquel Rutledge RN  Outcome: Progressing  2/16/2024 0527 by Raquel Rutledge RN  Outcome: Progressing

## 2024-02-16 NOTE — PLAN OF CARE
Problem: Discharge Planning  Goal: Discharge to home or other facility with appropriate resources  2/16/2024 1135 by Emma Marinelli RN  Outcome: Progressing  Flowsheets (Taken 2/16/2024 1135)  Discharge to home or other facility with appropriate resources:   Identify barriers to discharge with patient and caregiver   Identify discharge learning needs (meds, wound care, etc)     Problem: Pain  Goal: Verbalizes/displays adequate comfort level or baseline comfort level  2/16/2024 1135 by Emma Marinelli RN  Outcome: Progressing  Flowsheets (Taken 2/16/2024 1135)  Verbalizes/displays adequate comfort level or baseline comfort level:   Administer analgesics based on type and severity of pain and evaluate response   Encourage patient to monitor pain and request assistance   Implement non-pharmacological measures as appropriate and evaluate response   Assess pain using appropriate pain scale     Problem: Safety - Adult  Goal: Free from fall injury  2/16/2024 1135 by Emma Marinelli, RN  Outcome: Progressing  Flowsheets (Taken 2/16/2024 1135)  Free From Fall Injury: Instruct family/caregiver on patient safety

## 2024-02-16 NOTE — PLAN OF CARE
Problem: Discharge Planning  Goal: Discharge to home or other facility with appropriate resources  2/16/2024 1621 by Ama Watson RN  Outcome: Adequate for Discharge  2/16/2024 1135 by Emma Marinelli RN  Outcome: Progressing  Flowsheets (Taken 2/16/2024 1135)  Discharge to home or other facility with appropriate resources:   Identify barriers to discharge with patient and caregiver   Identify discharge learning needs (meds, wound care, etc)  2/16/2024 0527 by Raquel Rutledge RN  Outcome: Progressing     Problem: Pain  Goal: Verbalizes/displays adequate comfort level or baseline comfort level  2/16/2024 1621 by Ama Watson RN  Outcome: Adequate for Discharge  2/16/2024 1135 by Emma Marinelli RN  Outcome: Progressing  Flowsheets (Taken 2/16/2024 1135)  Verbalizes/displays adequate comfort level or baseline comfort level:   Administer analgesics based on type and severity of pain and evaluate response   Encourage patient to monitor pain and request assistance   Implement non-pharmacological measures as appropriate and evaluate response   Assess pain using appropriate pain scale  2/16/2024 0527 by Raquel Rutledge RN  Outcome: Progressing     Problem: Safety - Adult  Goal: Free from fall injury  2/16/2024 1621 by Ama Watson RN  Outcome: Adequate for Discharge  2/16/2024 1135 by Emma Marinelli RN  Outcome: Progressing  Flowsheets (Taken 2/16/2024 1135)  Free From Fall Injury: Instruct family/caregiver on patient safety  2/16/2024 0527 by Raquel Rutledge RN  Outcome: Progressing

## 2024-03-07 DIAGNOSIS — J44.9 STAGE 2 MODERATE COPD BY GOLD CLASSIFICATION (HCC): ICD-10-CM

## 2024-03-08 RX ORDER — ALBUTEROL SULFATE 90 UG/1
2 AEROSOL, METERED RESPIRATORY (INHALATION) EVERY 6 HOURS PRN
Qty: 3 EACH | Refills: 3 | Status: SHIPPED | OUTPATIENT
Start: 2024-03-08

## 2024-05-19 ENCOUNTER — APPOINTMENT (OUTPATIENT)
Dept: GENERAL RADIOLOGY | Age: 74
End: 2024-05-19
Payer: MEDICARE

## 2024-05-19 ENCOUNTER — HOSPITAL ENCOUNTER (EMERGENCY)
Age: 74
Discharge: HOME | End: 2024-05-19
Attending: EMERGENCY MEDICINE
Payer: MEDICARE

## 2024-05-19 VITALS
DIASTOLIC BLOOD PRESSURE: 62 MMHG | WEIGHT: 128 LBS | SYSTOLIC BLOOD PRESSURE: 160 MMHG | BODY MASS INDEX: 22.68 KG/M2 | HEIGHT: 63 IN | TEMPERATURE: 98.6 F | RESPIRATION RATE: 19 BRPM | OXYGEN SATURATION: 92 % | HEART RATE: 101 BPM

## 2024-05-19 DIAGNOSIS — J44.1 COPD EXACERBATION (HCC): Primary | ICD-10-CM

## 2024-05-19 LAB
ANION GAP SERPL CALCULATED.3IONS-SCNC: 14 MMOL/L (ref 9–17)
BASOPHILS # BLD: 0.04 K/UL (ref 0–0.2)
BASOPHILS NFR BLD: 0 % (ref 0–2)
BNP SERPL-MCNC: 533 PG/ML
BUN SERPL-MCNC: 10 MG/DL (ref 8–23)
BUN/CREAT SERPL: 17 (ref 9–20)
CALCIUM SERPL-MCNC: 9.6 MG/DL (ref 8.6–10.4)
CHLORIDE SERPL-SCNC: 98 MMOL/L (ref 98–107)
CO2 SERPL-SCNC: 25 MMOL/L (ref 20–31)
CREAT SERPL-MCNC: 0.6 MG/DL (ref 0.5–0.9)
EKG ATRIAL RATE: 105 BPM
EKG P AXIS: 83 DEGREES
EKG P-R INTERVAL: 148 MS
EKG Q-T INTERVAL: 322 MS
EKG QRS DURATION: 82 MS
EKG QTC CALCULATION (BAZETT): 425 MS
EKG R AXIS: 30 DEGREES
EKG T AXIS: 70 DEGREES
EKG VENTRICULAR RATE: 105 BPM
EOSINOPHIL # BLD: 0.1 K/UL (ref 0–0.44)
EOSINOPHILS RELATIVE PERCENT: 1 % (ref 1–4)
ERYTHROCYTE [DISTWIDTH] IN BLOOD BY AUTOMATED COUNT: 12.5 % (ref 11.8–14.4)
FLUAV AG SPEC QL: NEGATIVE
FLUBV AG SPEC QL: NEGATIVE
GFR, ESTIMATED: >90 ML/MIN/1.73M2
GLUCOSE SERPL-MCNC: 108 MG/DL (ref 70–99)
HCT VFR BLD AUTO: 39.4 % (ref 36.3–47.1)
HGB BLD-MCNC: 13.1 G/DL (ref 11.9–15.1)
IMM GRANULOCYTES # BLD AUTO: <0.03 K/UL (ref 0–0.3)
IMM GRANULOCYTES NFR BLD: 0 %
LYMPHOCYTES NFR BLD: 1.81 K/UL (ref 1.1–3.7)
LYMPHOCYTES RELATIVE PERCENT: 17 % (ref 24–43)
MCH RBC QN AUTO: 31.6 PG (ref 25.2–33.5)
MCHC RBC AUTO-ENTMCNC: 33.2 G/DL (ref 28.4–34.8)
MCV RBC AUTO: 94.9 FL (ref 82.6–102.9)
MONOCYTES NFR BLD: 1.39 K/UL (ref 0.1–1.2)
MONOCYTES NFR BLD: 13 % (ref 3–12)
NEUTROPHILS NFR BLD: 69 % (ref 36–65)
NEUTS SEG NFR BLD: 7.21 K/UL (ref 1.5–8.1)
NRBC BLD-RTO: 0 PER 100 WBC
PLATELET # BLD AUTO: 313 K/UL (ref 138–453)
PMV BLD AUTO: 8.4 FL (ref 8.1–13.5)
POTASSIUM SERPL-SCNC: 4.2 MMOL/L (ref 3.7–5.3)
RBC # BLD AUTO: 4.15 M/UL (ref 3.95–5.11)
SARS-COV-2 RDRP RESP QL NAA+PROBE: NOT DETECTED
SODIUM SERPL-SCNC: 137 MMOL/L (ref 135–144)
SPECIMEN DESCRIPTION: NORMAL
WBC OTHER # BLD: 10.6 K/UL (ref 3.5–11.3)

## 2024-05-19 PROCEDURE — 85025 COMPLETE CBC W/AUTO DIFF WBC: CPT

## 2024-05-19 PROCEDURE — 94664 DEMO&/EVAL PT USE INHALER: CPT

## 2024-05-19 PROCEDURE — 6370000000 HC RX 637 (ALT 250 FOR IP): Performed by: EMERGENCY MEDICINE

## 2024-05-19 PROCEDURE — 93010 ELECTROCARDIOGRAM REPORT: CPT | Performed by: INTERNAL MEDICINE

## 2024-05-19 PROCEDURE — 87804 INFLUENZA ASSAY W/OPTIC: CPT

## 2024-05-19 PROCEDURE — 83880 ASSAY OF NATRIURETIC PEPTIDE: CPT

## 2024-05-19 PROCEDURE — 96374 THER/PROPH/DIAG INJ IV PUSH: CPT

## 2024-05-19 PROCEDURE — 71045 X-RAY EXAM CHEST 1 VIEW: CPT

## 2024-05-19 PROCEDURE — 96375 TX/PRO/DX INJ NEW DRUG ADDON: CPT

## 2024-05-19 PROCEDURE — 80048 BASIC METABOLIC PNL TOTAL CA: CPT

## 2024-05-19 PROCEDURE — 87635 SARS-COV-2 COVID-19 AMP PRB: CPT

## 2024-05-19 PROCEDURE — 6360000002 HC RX W HCPCS: Performed by: EMERGENCY MEDICINE

## 2024-05-19 PROCEDURE — 93005 ELECTROCARDIOGRAM TRACING: CPT | Performed by: EMERGENCY MEDICINE

## 2024-05-19 PROCEDURE — 99285 EMERGENCY DEPT VISIT HI MDM: CPT

## 2024-05-19 PROCEDURE — 2580000003 HC RX 258

## 2024-05-19 RX ORDER — IPRATROPIUM BROMIDE AND ALBUTEROL SULFATE 2.5; .5 MG/3ML; MG/3ML
1 SOLUTION RESPIRATORY (INHALATION) ONCE
Status: COMPLETED | OUTPATIENT
Start: 2024-05-19 | End: 2024-05-19

## 2024-05-19 RX ORDER — BENZONATATE 100 MG/1
100 CAPSULE ORAL 3 TIMES DAILY PRN
Qty: 30 CAPSULE | Refills: 0 | Status: SHIPPED | OUTPATIENT
Start: 2024-05-19 | End: 2024-05-29

## 2024-05-19 RX ORDER — AZITHROMYCIN 250 MG/1
TABLET, FILM COATED ORAL
Qty: 1 PACKET | Refills: 0 | Status: SHIPPED | OUTPATIENT
Start: 2024-05-19 | End: 2024-05-23

## 2024-05-19 RX ORDER — METHYLPREDNISOLONE SODIUM SUCCINATE 125 MG/2ML
125 INJECTION, POWDER, LYOPHILIZED, FOR SOLUTION INTRAMUSCULAR; INTRAVENOUS ONCE
Status: COMPLETED | OUTPATIENT
Start: 2024-05-19 | End: 2024-05-19

## 2024-05-19 RX ORDER — ONDANSETRON 2 MG/ML
4 INJECTION INTRAMUSCULAR; INTRAVENOUS ONCE
Status: COMPLETED | OUTPATIENT
Start: 2024-05-19 | End: 2024-05-19

## 2024-05-19 RX ORDER — PREDNISONE 50 MG/1
50 TABLET ORAL DAILY
Qty: 5 TABLET | Refills: 0 | Status: SHIPPED | OUTPATIENT
Start: 2024-05-19 | End: 2024-05-24

## 2024-05-19 RX ORDER — BENZONATATE 100 MG/1
100 CAPSULE ORAL 3 TIMES DAILY PRN
Status: DISCONTINUED | OUTPATIENT
Start: 2024-05-19 | End: 2024-05-19 | Stop reason: HOSPADM

## 2024-05-19 RX ORDER — IPRATROPIUM BROMIDE AND ALBUTEROL SULFATE 2.5; .5 MG/3ML; MG/3ML
1 SOLUTION RESPIRATORY (INHALATION) EVERY 4 HOURS
Qty: 360 ML | Refills: 0 | Status: SHIPPED | OUTPATIENT
Start: 2024-05-19

## 2024-05-19 RX ORDER — WATER 10 ML/10ML
INJECTION INTRAMUSCULAR; INTRAVENOUS; SUBCUTANEOUS
Status: COMPLETED
Start: 2024-05-19 | End: 2024-05-19

## 2024-05-19 RX ADMIN — ONDANSETRON 4 MG: 2 INJECTION INTRAMUSCULAR; INTRAVENOUS at 09:45

## 2024-05-19 RX ADMIN — BENZONATATE 100 MG: 100 CAPSULE ORAL at 10:53

## 2024-05-19 RX ADMIN — WATER 10 ML: 1 INJECTION INTRAMUSCULAR; INTRAVENOUS; SUBCUTANEOUS at 09:12

## 2024-05-19 RX ADMIN — IPRATROPIUM BROMIDE AND ALBUTEROL SULFATE 1 DOSE: 2.5; .5 SOLUTION RESPIRATORY (INHALATION) at 09:10

## 2024-05-19 RX ADMIN — METHYLPREDNISOLONE SODIUM SUCCINATE 125 MG: 125 INJECTION INTRAMUSCULAR; INTRAVENOUS at 09:11

## 2024-05-19 NOTE — ED PROVIDER NOTES
[facility] ED  EMERGENCY DEPARTMENT ENCOUNTER      Pt Name: Eli Marquez  MRN: [id number]  Birthdate 1950  Date of evaluation: 5/19/2024  Provider: Adina Bullock DO    CHIEF COMPLAINT       Chief Complaint   Patient presents with    Shortness of Breath     Started feeling unwell Wednesday with intermittent fevers, wheezing, and worsening cough. Does have history of COPD and has used her inhalers but they have not helped. Last night pt coughing caused her to vomit.           HISTORY OF PRESENT ILLNESS   (Location/Symptom, Timing/Onset, Context/Setting, Quality, Duration, Modifying Factors, Severity)  Note limiting factors.   Eli SESAY Speaker is a 73 y.o. female who presents to the emergency department with complains of shortness of breath and cough for the past 4 days.  Patient states that she has a history of COPD and has been using her inhalers at home with minimal relief.  She has only been doing her DuoNeb once every day as she did not know if she can do it more than once.  Patient quit smoking about 24 years ago.  Patient states that her voice has been hoarse but not as bad as it is today.  She denies any chest pain or swelling to her lower extremities.  She denies taking any steroids recently.  She did use her inhalers this morning with a little bit of relief.  Patient does appear to be in slight distress with diffuse wheezing bilaterally.  She does admit to having a fever yesterday of 100.8 degrees.  Nobody else at home has been sick.  Sometimes the cough is productive but other times it is not.    REVIEW OF SYSTEMS    (2-9 systems for level 4, 10 or more for level 5)     Review of Systems   Constitutional:  Positive for fever. Negative for fatigue.   HENT:  Negative for congestion and sore throat.    Eyes:  Negative for visual disturbance.   Respiratory:  Positive for cough and shortness of breath.    Cardiovascular:  Negative for chest pain and palpitations.   Gastrointestinal:

## 2024-05-19 NOTE — DISCHARGE INSTRUCTIONS
Scheduled nebulizer treatments every 3-4 hours for the next 24 to 48 hours while you are awake.  After that you can use it as needed as long as you are feeling better.  Prednisone for the next 5 days.  Follow-up closely with your family doctor in the next 2 to 3 days.  Return to the emergency department if symptoms get worse

## 2024-09-07 ENCOUNTER — APPOINTMENT (OUTPATIENT)
Dept: GENERAL RADIOLOGY | Age: 74
DRG: 179 | End: 2024-09-07
Payer: MEDICARE

## 2024-09-07 ENCOUNTER — HOSPITAL ENCOUNTER (INPATIENT)
Age: 74
LOS: 3 days | Discharge: HOME | DRG: 179 | End: 2024-09-10
Attending: EMERGENCY MEDICINE | Admitting: INTERNAL MEDICINE
Payer: MEDICARE

## 2024-09-07 ENCOUNTER — APPOINTMENT (OUTPATIENT)
Dept: CT IMAGING | Age: 74
DRG: 179 | End: 2024-09-07
Payer: MEDICARE

## 2024-09-07 DIAGNOSIS — U07.1 COVID-19: ICD-10-CM

## 2024-09-07 DIAGNOSIS — H81.10 BENIGN PAROXYSMAL POSITIONAL VERTIGO, UNSPECIFIED LATERALITY: Primary | ICD-10-CM

## 2024-09-07 LAB
ALBUMIN SERPL-MCNC: 4.3 G/DL (ref 3.5–5.2)
ALBUMIN/GLOB SERPL: 1.5 {RATIO} (ref 1–2.5)
ALP SERPL-CCNC: 91 U/L (ref 35–104)
ALT SERPL-CCNC: 21 U/L (ref 10–35)
ANION GAP SERPL CALCULATED.3IONS-SCNC: 16 MMOL/L (ref 9–16)
AST SERPL-CCNC: 27 U/L (ref 10–35)
BACTERIA URNS QL MICRO: ABNORMAL
BASOPHILS # BLD: <0.03 K/UL (ref 0–0.2)
BASOPHILS NFR BLD: 0 % (ref 0–2)
BILIRUB SERPL-MCNC: 0.3 MG/DL (ref 0–1.2)
BILIRUB UR QL STRIP: NEGATIVE
BUN SERPL-MCNC: 14 MG/DL (ref 8–23)
BUN/CREAT SERPL: 23 (ref 9–20)
CALCIUM SERPL-MCNC: 9.3 MG/DL (ref 8.6–10.4)
CHARACTER UR: ABNORMAL
CHARACTER UR: ABNORMAL
CHLORIDE SERPL-SCNC: 99 MMOL/L (ref 98–107)
CLARITY UR: CLEAR
CO2 SERPL-SCNC: 21 MMOL/L (ref 20–31)
COLOR UR: YELLOW
CREAT SERPL-MCNC: 0.6 MG/DL (ref 0.5–0.9)
EKG ATRIAL RATE: 81 BPM
EKG P AXIS: 80 DEGREES
EKG P-R INTERVAL: 152 MS
EKG Q-T INTERVAL: 370 MS
EKG QRS DURATION: 80 MS
EKG QTC CALCULATION (BAZETT): 429 MS
EKG R AXIS: 37 DEGREES
EKG T AXIS: 69 DEGREES
EKG VENTRICULAR RATE: 81 BPM
EOSINOPHIL # BLD: 0.05 K/UL (ref 0–0.44)
EOSINOPHILS RELATIVE PERCENT: 1 % (ref 1–4)
EPI CELLS #/AREA URNS HPF: ABNORMAL /HPF (ref 0–25)
ERYTHROCYTE [DISTWIDTH] IN BLOOD BY AUTOMATED COUNT: 12.5 % (ref 11.8–14.4)
FLUAV AG SPEC QL: NEGATIVE
FLUBV AG SPEC QL: NEGATIVE
GFR, ESTIMATED: >90 ML/MIN/1.73M2
GLUCOSE SERPL-MCNC: 73 MG/DL (ref 74–99)
GLUCOSE UR STRIP-MCNC: NEGATIVE MG/DL
HCT VFR BLD AUTO: 42.8 % (ref 36.3–47.1)
HGB BLD-MCNC: 14.6 G/DL (ref 11.9–15.1)
HGB UR QL STRIP.AUTO: ABNORMAL
IMM GRANULOCYTES # BLD AUTO: <0.03 K/UL (ref 0–0.3)
IMM GRANULOCYTES NFR BLD: 0 %
KETONES UR STRIP-MCNC: ABNORMAL MG/DL
LEUKOCYTE ESTERASE UR QL STRIP: ABNORMAL
LYMPHOCYTES NFR BLD: 1.44 K/UL (ref 1.1–3.7)
LYMPHOCYTES RELATIVE PERCENT: 24 % (ref 24–43)
MAGNESIUM SERPL-MCNC: 2 MG/DL (ref 1.6–2.4)
MCH RBC QN AUTO: 31.7 PG (ref 25.2–33.5)
MCHC RBC AUTO-ENTMCNC: 34.1 G/DL (ref 28.4–34.8)
MCV RBC AUTO: 93 FL (ref 82.6–102.9)
MONOCYTES NFR BLD: 1.02 K/UL (ref 0.1–1.2)
MONOCYTES NFR BLD: 17 % (ref 3–12)
MUCOUS THREADS URNS QL MICRO: ABNORMAL
NEUTROPHILS NFR BLD: 58 % (ref 36–65)
NEUTS SEG NFR BLD: 3.49 K/UL (ref 1.5–8.1)
NITRITE UR QL STRIP: NEGATIVE
NRBC BLD-RTO: 0 PER 100 WBC
PH UR STRIP: 6 [PH] (ref 5–9)
PLATELET # BLD AUTO: 199 K/UL (ref 138–453)
PMV BLD AUTO: 8.3 FL (ref 8.1–13.5)
POTASSIUM SERPL-SCNC: 4.5 MMOL/L (ref 3.7–5.3)
PROT SERPL-MCNC: 7.3 G/DL (ref 6.6–8.7)
PROT UR STRIP-MCNC: NEGATIVE MG/DL
RBC # BLD AUTO: 4.6 M/UL (ref 3.95–5.11)
RBC #/AREA URNS HPF: ABNORMAL /HPF (ref 0–2)
SARS-COV-2 RDRP RESP QL NAA+PROBE: DETECTED
SODIUM SERPL-SCNC: 136 MMOL/L (ref 136–145)
SP GR UR STRIP: 1.01 (ref 1.01–1.02)
SPECIMEN DESCRIPTION: ABNORMAL
UROBILINOGEN UR STRIP-ACNC: NORMAL EU/DL (ref 0–1)
WBC #/AREA URNS HPF: ABNORMAL /HPF (ref 0–5)
WBC OTHER # BLD: 6 K/UL (ref 3.5–11.3)

## 2024-09-07 PROCEDURE — 94664 DEMO&/EVAL PT USE INHALER: CPT

## 2024-09-07 PROCEDURE — 6370000000 HC RX 637 (ALT 250 FOR IP): Performed by: EMERGENCY MEDICINE

## 2024-09-07 PROCEDURE — 94669 MECHANICAL CHEST WALL OSCILL: CPT

## 2024-09-07 PROCEDURE — 6360000002 HC RX W HCPCS: Performed by: INTERNAL MEDICINE

## 2024-09-07 PROCEDURE — 6360000002 HC RX W HCPCS: Performed by: EMERGENCY MEDICINE

## 2024-09-07 PROCEDURE — 85025 COMPLETE CBC W/AUTO DIFF WBC: CPT

## 2024-09-07 PROCEDURE — 96375 TX/PRO/DX INJ NEW DRUG ADDON: CPT

## 2024-09-07 PROCEDURE — 80053 COMPREHEN METABOLIC PANEL: CPT

## 2024-09-07 PROCEDURE — 71045 X-RAY EXAM CHEST 1 VIEW: CPT

## 2024-09-07 PROCEDURE — 1200000000 HC SEMI PRIVATE

## 2024-09-07 PROCEDURE — 93010 ELECTROCARDIOGRAM REPORT: CPT | Performed by: FAMILY MEDICINE

## 2024-09-07 PROCEDURE — 96374 THER/PROPH/DIAG INJ IV PUSH: CPT

## 2024-09-07 PROCEDURE — 6370000000 HC RX 637 (ALT 250 FOR IP): Performed by: INTERNAL MEDICINE

## 2024-09-07 PROCEDURE — 99285 EMERGENCY DEPT VISIT HI MDM: CPT

## 2024-09-07 PROCEDURE — 81001 URINALYSIS AUTO W/SCOPE: CPT

## 2024-09-07 PROCEDURE — 94761 N-INVAS EAR/PLS OXIMETRY MLT: CPT

## 2024-09-07 PROCEDURE — 87804 INFLUENZA ASSAY W/OPTIC: CPT

## 2024-09-07 PROCEDURE — 2580000003 HC RX 258: Performed by: EMERGENCY MEDICINE

## 2024-09-07 PROCEDURE — 93005 ELECTROCARDIOGRAM TRACING: CPT | Performed by: EMERGENCY MEDICINE

## 2024-09-07 PROCEDURE — 96361 HYDRATE IV INFUSION ADD-ON: CPT

## 2024-09-07 PROCEDURE — 83735 ASSAY OF MAGNESIUM: CPT

## 2024-09-07 PROCEDURE — 94640 AIRWAY INHALATION TREATMENT: CPT

## 2024-09-07 PROCEDURE — 2580000003 HC RX 258: Performed by: INTERNAL MEDICINE

## 2024-09-07 PROCEDURE — 87635 SARS-COV-2 COVID-19 AMP PRB: CPT

## 2024-09-07 PROCEDURE — 70450 CT HEAD/BRAIN W/O DYE: CPT

## 2024-09-07 RX ORDER — ONDANSETRON 2 MG/ML
4 INJECTION INTRAMUSCULAR; INTRAVENOUS ONCE
Status: COMPLETED | OUTPATIENT
Start: 2024-09-07 | End: 2024-09-07

## 2024-09-07 RX ORDER — ENOXAPARIN SODIUM 100 MG/ML
40 INJECTION SUBCUTANEOUS DAILY
Status: DISCONTINUED | OUTPATIENT
Start: 2024-09-07 | End: 2024-09-10 | Stop reason: HOSPADM

## 2024-09-07 RX ORDER — TIMOLOL MALEATE 5 MG/ML
1 SOLUTION/ DROPS OPHTHALMIC 2 TIMES DAILY
Status: DISCONTINUED | OUTPATIENT
Start: 2024-09-07 | End: 2024-09-10 | Stop reason: HOSPADM

## 2024-09-07 RX ORDER — ONDANSETRON 2 MG/ML
4 INJECTION INTRAMUSCULAR; INTRAVENOUS EVERY 6 HOURS PRN
Status: DISCONTINUED | OUTPATIENT
Start: 2024-09-07 | End: 2024-09-10 | Stop reason: HOSPADM

## 2024-09-07 RX ORDER — MECLIZINE HCL 12.5 MG 12.5 MG/1
12.5 TABLET ORAL ONCE
Status: COMPLETED | OUTPATIENT
Start: 2024-09-07 | End: 2024-09-07

## 2024-09-07 RX ORDER — ATORVASTATIN CALCIUM 40 MG/1
40 TABLET, FILM COATED ORAL DAILY
Status: DISCONTINUED | OUTPATIENT
Start: 2024-09-07 | End: 2024-09-10 | Stop reason: HOSPADM

## 2024-09-07 RX ORDER — IPRATROPIUM BROMIDE AND ALBUTEROL SULFATE 2.5; .5 MG/3ML; MG/3ML
1 SOLUTION RESPIRATORY (INHALATION) ONCE
Status: COMPLETED | OUTPATIENT
Start: 2024-09-07 | End: 2024-09-07

## 2024-09-07 RX ORDER — GABAPENTIN 300 MG/1
300 CAPSULE ORAL NIGHTLY
Status: DISCONTINUED | OUTPATIENT
Start: 2024-09-07 | End: 2024-09-10 | Stop reason: HOSPADM

## 2024-09-07 RX ORDER — DEXAMETHASONE SODIUM PHOSPHATE 10 MG/ML
6 INJECTION INTRAMUSCULAR; INTRAVENOUS EVERY 24 HOURS
Status: DISCONTINUED | OUTPATIENT
Start: 2024-09-07 | End: 2024-09-10

## 2024-09-07 RX ORDER — SODIUM CHLORIDE 0.9 % (FLUSH) 0.9 %
5-40 SYRINGE (ML) INJECTION EVERY 12 HOURS SCHEDULED
Status: DISCONTINUED | OUTPATIENT
Start: 2024-09-07 | End: 2024-09-10 | Stop reason: HOSPADM

## 2024-09-07 RX ORDER — 0.9 % SODIUM CHLORIDE 0.9 %
1000 INTRAVENOUS SOLUTION INTRAVENOUS ONCE
Status: COMPLETED | OUTPATIENT
Start: 2024-09-07 | End: 2024-09-07

## 2024-09-07 RX ORDER — ALBUTEROL SULFATE 90 UG/1
2 AEROSOL, METERED RESPIRATORY (INHALATION) EVERY 6 HOURS PRN
Status: DISCONTINUED | OUTPATIENT
Start: 2024-09-07 | End: 2024-09-10 | Stop reason: HOSPADM

## 2024-09-07 RX ORDER — POTASSIUM CHLORIDE 7.45 MG/ML
10 INJECTION INTRAVENOUS PRN
Status: DISCONTINUED | OUTPATIENT
Start: 2024-09-07 | End: 2024-09-10 | Stop reason: HOSPADM

## 2024-09-07 RX ORDER — SODIUM CHLORIDE 9 MG/ML
INJECTION, SOLUTION INTRAVENOUS PRN
Status: DISCONTINUED | OUTPATIENT
Start: 2024-09-07 | End: 2024-09-10 | Stop reason: HOSPADM

## 2024-09-07 RX ORDER — ACETAMINOPHEN 650 MG/1
650 SUPPOSITORY RECTAL EVERY 6 HOURS PRN
Status: DISCONTINUED | OUTPATIENT
Start: 2024-09-07 | End: 2024-09-10 | Stop reason: HOSPADM

## 2024-09-07 RX ORDER — POTASSIUM CHLORIDE 1500 MG/1
40 TABLET, EXTENDED RELEASE ORAL PRN
Status: DISCONTINUED | OUTPATIENT
Start: 2024-09-07 | End: 2024-09-10 | Stop reason: HOSPADM

## 2024-09-07 RX ORDER — ONDANSETRON 4 MG/1
4 TABLET, ORALLY DISINTEGRATING ORAL EVERY 8 HOURS PRN
Status: DISCONTINUED | OUTPATIENT
Start: 2024-09-07 | End: 2024-09-10 | Stop reason: HOSPADM

## 2024-09-07 RX ORDER — ACETAMINOPHEN 325 MG/1
650 TABLET ORAL EVERY 6 HOURS PRN
Status: DISCONTINUED | OUTPATIENT
Start: 2024-09-07 | End: 2024-09-10 | Stop reason: HOSPADM

## 2024-09-07 RX ORDER — IPRATROPIUM BROMIDE AND ALBUTEROL SULFATE 2.5; .5 MG/3ML; MG/3ML
1 SOLUTION RESPIRATORY (INHALATION)
Status: DISCONTINUED | OUTPATIENT
Start: 2024-09-07 | End: 2024-09-10 | Stop reason: HOSPADM

## 2024-09-07 RX ORDER — ROPINIROLE 1 MG/1
1 TABLET, FILM COATED ORAL EVERY EVENING
Status: DISCONTINUED | OUTPATIENT
Start: 2024-09-07 | End: 2024-09-10 | Stop reason: HOSPADM

## 2024-09-07 RX ORDER — SODIUM CHLORIDE 0.9 % (FLUSH) 0.9 %
10 SYRINGE (ML) INJECTION PRN
Status: DISCONTINUED | OUTPATIENT
Start: 2024-09-07 | End: 2024-09-10 | Stop reason: HOSPADM

## 2024-09-07 RX ORDER — SODIUM CHLORIDE 9 MG/ML
INJECTION, SOLUTION INTRAVENOUS CONTINUOUS
Status: DISCONTINUED | OUTPATIENT
Start: 2024-09-07 | End: 2024-09-08

## 2024-09-07 RX ORDER — LATANOPROST 50 UG/ML
2 SOLUTION/ DROPS OPHTHALMIC DAILY
Status: DISCONTINUED | OUTPATIENT
Start: 2024-09-07 | End: 2024-09-10 | Stop reason: HOSPADM

## 2024-09-07 RX ORDER — IPRATROPIUM BROMIDE AND ALBUTEROL SULFATE 2.5; .5 MG/3ML; MG/3ML
1 SOLUTION RESPIRATORY (INHALATION)
Status: DISCONTINUED | OUTPATIENT
Start: 2024-09-07 | End: 2024-09-07

## 2024-09-07 RX ORDER — ASPIRIN 81 MG/1
81 TABLET ORAL NIGHTLY
Status: DISCONTINUED | OUTPATIENT
Start: 2024-09-07 | End: 2024-09-10 | Stop reason: HOSPADM

## 2024-09-07 RX ORDER — MAGNESIUM SULFATE IN WATER 40 MG/ML
2000 INJECTION, SOLUTION INTRAVENOUS PRN
Status: DISCONTINUED | OUTPATIENT
Start: 2024-09-07 | End: 2024-09-10 | Stop reason: HOSPADM

## 2024-09-07 RX ORDER — TRIAMCINOLONE ACETONIDE 1 MG/G
CREAM TOPICAL AS NEEDED
Status: DISCONTINUED | OUTPATIENT
Start: 2024-09-07 | End: 2024-09-10 | Stop reason: HOSPADM

## 2024-09-07 RX ORDER — EZETIMIBE 10 MG/1
10 TABLET ORAL NIGHTLY
Status: DISCONTINUED | OUTPATIENT
Start: 2024-09-07 | End: 2024-09-10 | Stop reason: HOSPADM

## 2024-09-07 RX ORDER — POLYETHYLENE GLYCOL 3350 17 G/17G
17 POWDER, FOR SOLUTION ORAL DAILY PRN
Status: DISCONTINUED | OUTPATIENT
Start: 2024-09-07 | End: 2024-09-10 | Stop reason: HOSPADM

## 2024-09-07 RX ORDER — PANTOPRAZOLE SODIUM 40 MG/1
40 TABLET, DELAYED RELEASE ORAL NIGHTLY
Status: DISCONTINUED | OUTPATIENT
Start: 2024-09-07 | End: 2024-09-10 | Stop reason: HOSPADM

## 2024-09-07 RX ADMIN — ENOXAPARIN SODIUM 40 MG: 100 INJECTION SUBCUTANEOUS at 17:41

## 2024-09-07 RX ADMIN — PANTOPRAZOLE SODIUM 40 MG: 40 TABLET, DELAYED RELEASE ORAL at 21:02

## 2024-09-07 RX ADMIN — WATER 125 MG: 1 INJECTION INTRAMUSCULAR; INTRAVENOUS; SUBCUTANEOUS at 10:25

## 2024-09-07 RX ADMIN — EZETIMIBE 10 MG: 10 TABLET ORAL at 21:02

## 2024-09-07 RX ADMIN — ATORVASTATIN CALCIUM 40 MG: 40 TABLET, FILM COATED ORAL at 21:02

## 2024-09-07 RX ADMIN — ASPIRIN 81 MG: 81 TABLET, COATED ORAL at 21:01

## 2024-09-07 RX ADMIN — IPRATROPIUM BROMIDE AND ALBUTEROL SULFATE 1 DOSE: 2.5; .5 SOLUTION RESPIRATORY (INHALATION) at 20:18

## 2024-09-07 RX ADMIN — SODIUM CHLORIDE 1000 ML: 9 INJECTION, SOLUTION INTRAVENOUS at 10:25

## 2024-09-07 RX ADMIN — ROPINIROLE HYDROCHLORIDE 1 MG: 1 TABLET, FILM COATED ORAL at 17:46

## 2024-09-07 RX ADMIN — SODIUM CHLORIDE: 9 INJECTION, SOLUTION INTRAVENOUS at 15:41

## 2024-09-07 RX ADMIN — DEXAMETHASONE SODIUM PHOSPHATE 6 MG: 10 INJECTION INTRAMUSCULAR; INTRAVENOUS at 17:40

## 2024-09-07 RX ADMIN — LATANOPROST 2 DROP: 50 SOLUTION/ DROPS OPHTHALMIC at 21:04

## 2024-09-07 RX ADMIN — GABAPENTIN 300 MG: 300 CAPSULE ORAL at 21:02

## 2024-09-07 RX ADMIN — MECLIZINE 12.5 MG: 12.5 TABLET ORAL at 11:51

## 2024-09-07 RX ADMIN — IPRATROPIUM BROMIDE AND ALBUTEROL SULFATE 1 DOSE: .5; 3 SOLUTION RESPIRATORY (INHALATION) at 10:30

## 2024-09-07 RX ADMIN — TIMOLOL MALEATE 1 DROP: 5 SOLUTION OPHTHALMIC at 21:03

## 2024-09-07 RX ADMIN — ONDANSETRON 4 MG: 2 INJECTION INTRAMUSCULAR; INTRAVENOUS at 10:25

## 2024-09-07 ASSESSMENT — PAIN - FUNCTIONAL ASSESSMENT: PAIN_FUNCTIONAL_ASSESSMENT: NONE - DENIES PAIN

## 2024-09-07 ASSESSMENT — LIFESTYLE VARIABLES: HOW OFTEN DO YOU HAVE A DRINK CONTAINING ALCOHOL: 2-3 TIMES A WEEK

## 2024-09-08 PROBLEM — R42 VERTIGO: Status: ACTIVE | Noted: 2024-09-08

## 2024-09-08 LAB
ALBUMIN SERPL-MCNC: 3.8 G/DL (ref 3.5–5.2)
ALBUMIN/GLOB SERPL: 1.3 {RATIO} (ref 1–2.5)
ALP SERPL-CCNC: 75 U/L (ref 35–104)
ALT SERPL-CCNC: 15 U/L (ref 10–35)
ANION GAP SERPL CALCULATED.3IONS-SCNC: 12 MMOL/L (ref 9–16)
AST SERPL-CCNC: 19 U/L (ref 10–35)
BASOPHILS # BLD: <0.03 K/UL (ref 0–0.2)
BASOPHILS NFR BLD: 0 % (ref 0–2)
BILIRUB SERPL-MCNC: <0.2 MG/DL (ref 0–1.2)
BUN SERPL-MCNC: 17 MG/DL (ref 8–23)
BUN/CREAT SERPL: 28 (ref 9–20)
CALCIUM SERPL-MCNC: 8.9 MG/DL (ref 8.6–10.4)
CHLORIDE SERPL-SCNC: 104 MMOL/L (ref 98–107)
CO2 SERPL-SCNC: 21 MMOL/L (ref 20–31)
CREAT SERPL-MCNC: 0.6 MG/DL (ref 0.5–0.9)
EOSINOPHIL # BLD: <0.03 K/UL (ref 0–0.44)
EOSINOPHILS RELATIVE PERCENT: 0 % (ref 1–4)
ERYTHROCYTE [DISTWIDTH] IN BLOOD BY AUTOMATED COUNT: 12.2 % (ref 11.8–14.4)
GFR, ESTIMATED: >90 ML/MIN/1.73M2
GLUCOSE SERPL-MCNC: 176 MG/DL (ref 74–99)
HCT VFR BLD AUTO: 37.7 % (ref 36.3–47.1)
HGB BLD-MCNC: 12.6 G/DL (ref 11.9–15.1)
IMM GRANULOCYTES # BLD AUTO: <0.03 K/UL (ref 0–0.3)
IMM GRANULOCYTES NFR BLD: 0 %
LYMPHOCYTES NFR BLD: 0.85 K/UL (ref 1.1–3.7)
LYMPHOCYTES RELATIVE PERCENT: 13 % (ref 24–43)
MCH RBC QN AUTO: 31.3 PG (ref 25.2–33.5)
MCHC RBC AUTO-ENTMCNC: 33.4 G/DL (ref 28.4–34.8)
MCV RBC AUTO: 93.5 FL (ref 82.6–102.9)
MONOCYTES NFR BLD: 0.48 K/UL (ref 0.1–1.2)
MONOCYTES NFR BLD: 7 % (ref 3–12)
NEUTROPHILS NFR BLD: 80 % (ref 36–65)
NEUTS SEG NFR BLD: 5.21 K/UL (ref 1.5–8.1)
NRBC BLD-RTO: 0 PER 100 WBC
PLATELET # BLD AUTO: 227 K/UL (ref 138–453)
PMV BLD AUTO: 9 FL (ref 8.1–13.5)
POTASSIUM SERPL-SCNC: 4.3 MMOL/L (ref 3.7–5.3)
PROT SERPL-MCNC: 6.7 G/DL (ref 6.6–8.7)
RBC # BLD AUTO: 4.03 M/UL (ref 3.95–5.11)
SODIUM SERPL-SCNC: 137 MMOL/L (ref 136–145)
WBC OTHER # BLD: 6.6 K/UL (ref 3.5–11.3)

## 2024-09-08 PROCEDURE — 36415 COLL VENOUS BLD VENIPUNCTURE: CPT

## 2024-09-08 PROCEDURE — 97112 NEUROMUSCULAR REEDUCATION: CPT

## 2024-09-08 PROCEDURE — 85025 COMPLETE CBC W/AUTO DIFF WBC: CPT

## 2024-09-08 PROCEDURE — 94640 AIRWAY INHALATION TREATMENT: CPT

## 2024-09-08 PROCEDURE — 2580000003 HC RX 258: Performed by: INTERNAL MEDICINE

## 2024-09-08 PROCEDURE — 94761 N-INVAS EAR/PLS OXIMETRY MLT: CPT

## 2024-09-08 PROCEDURE — 94664 DEMO&/EVAL PT USE INHALER: CPT

## 2024-09-08 PROCEDURE — 6370000000 HC RX 637 (ALT 250 FOR IP): Performed by: INTERNAL MEDICINE

## 2024-09-08 PROCEDURE — 97162 PT EVAL MOD COMPLEX 30 MIN: CPT

## 2024-09-08 PROCEDURE — 80053 COMPREHEN METABOLIC PANEL: CPT

## 2024-09-08 PROCEDURE — 6360000002 HC RX W HCPCS: Performed by: INTERNAL MEDICINE

## 2024-09-08 PROCEDURE — 1200000000 HC SEMI PRIVATE

## 2024-09-08 PROCEDURE — 6370000000 HC RX 637 (ALT 250 FOR IP)

## 2024-09-08 PROCEDURE — 94669 MECHANICAL CHEST WALL OSCILL: CPT

## 2024-09-08 RX ORDER — MECLIZINE HYDROCHLORIDE 25 MG/1
25 TABLET ORAL 3 TIMES DAILY
Status: DISCONTINUED | OUTPATIENT
Start: 2024-09-08 | End: 2024-09-10 | Stop reason: HOSPADM

## 2024-09-08 RX ADMIN — EZETIMIBE 10 MG: 10 TABLET ORAL at 20:39

## 2024-09-08 RX ADMIN — ACETAMINOPHEN 650 MG: 325 TABLET ORAL at 01:40

## 2024-09-08 RX ADMIN — IPRATROPIUM BROMIDE AND ALBUTEROL SULFATE 1 DOSE: 2.5; .5 SOLUTION RESPIRATORY (INHALATION) at 08:30

## 2024-09-08 RX ADMIN — TIMOLOL MALEATE 1 DROP: 5 SOLUTION OPHTHALMIC at 09:19

## 2024-09-08 RX ADMIN — IPRATROPIUM BROMIDE AND ALBUTEROL SULFATE 1 DOSE: 2.5; .5 SOLUTION RESPIRATORY (INHALATION) at 14:48

## 2024-09-08 RX ADMIN — SODIUM CHLORIDE, PRESERVATIVE FREE 10 ML: 5 INJECTION INTRAVENOUS at 20:45

## 2024-09-08 RX ADMIN — ASPIRIN 81 MG: 81 TABLET, COATED ORAL at 20:39

## 2024-09-08 RX ADMIN — ENOXAPARIN SODIUM 40 MG: 100 INJECTION SUBCUTANEOUS at 15:51

## 2024-09-08 RX ADMIN — GABAPENTIN 300 MG: 300 CAPSULE ORAL at 20:39

## 2024-09-08 RX ADMIN — DEXAMETHASONE SODIUM PHOSPHATE 6 MG: 10 INJECTION INTRAMUSCULAR; INTRAVENOUS at 15:52

## 2024-09-08 RX ADMIN — PANTOPRAZOLE SODIUM 40 MG: 40 TABLET, DELAYED RELEASE ORAL at 20:39

## 2024-09-08 RX ADMIN — MECLIZINE HYDROCHLORIDE 25 MG: 25 TABLET ORAL at 15:51

## 2024-09-08 RX ADMIN — BENZOCAINE 6 MG-MENTHOL 10 MG LOZENGES 1 LOZENGE: at 22:29

## 2024-09-08 RX ADMIN — LATANOPROST 2 DROP: 50 SOLUTION/ DROPS OPHTHALMIC at 20:43

## 2024-09-08 RX ADMIN — SODIUM CHLORIDE: 9 INJECTION, SOLUTION INTRAVENOUS at 04:00

## 2024-09-08 RX ADMIN — ROPINIROLE HYDROCHLORIDE 1 MG: 1 TABLET, FILM COATED ORAL at 18:02

## 2024-09-08 RX ADMIN — TIMOLOL MALEATE 1 DROP: 5 SOLUTION OPHTHALMIC at 20:43

## 2024-09-08 RX ADMIN — POLYETHYLENE GLYCOL 3350 17 G: 17 POWDER, FOR SOLUTION ORAL at 09:19

## 2024-09-08 RX ADMIN — ATORVASTATIN CALCIUM 40 MG: 40 TABLET, FILM COATED ORAL at 20:39

## 2024-09-08 RX ADMIN — IPRATROPIUM BROMIDE AND ALBUTEROL SULFATE 1 DOSE: 2.5; .5 SOLUTION RESPIRATORY (INHALATION) at 20:16

## 2024-09-08 RX ADMIN — MECLIZINE HYDROCHLORIDE 25 MG: 25 TABLET ORAL at 20:39

## 2024-09-08 ASSESSMENT — PAIN DESCRIPTION - DESCRIPTORS: DESCRIPTORS: POUNDING

## 2024-09-08 ASSESSMENT — PAIN DESCRIPTION - ORIENTATION: ORIENTATION: UPPER

## 2024-09-08 ASSESSMENT — PAIN SCALES - GENERAL
PAINLEVEL_OUTOF10: 4
PAINLEVEL_OUTOF10: 8

## 2024-09-08 ASSESSMENT — PAIN DESCRIPTION - LOCATION: LOCATION: HEAD

## 2024-09-09 LAB
ALBUMIN SERPL-MCNC: 4 G/DL (ref 3.5–5.2)
ALBUMIN/GLOB SERPL: 1.4 {RATIO} (ref 1–2.5)
ALP SERPL-CCNC: 76 U/L (ref 35–104)
ALT SERPL-CCNC: 18 U/L (ref 10–35)
ANION GAP SERPL CALCULATED.3IONS-SCNC: 11 MMOL/L (ref 9–16)
AST SERPL-CCNC: 19 U/L (ref 10–35)
BASOPHILS # BLD: <0.03 K/UL (ref 0–0.2)
BASOPHILS NFR BLD: 0 % (ref 0–2)
BILIRUB SERPL-MCNC: <0.2 MG/DL (ref 0–1.2)
BUN SERPL-MCNC: 11 MG/DL (ref 8–23)
BUN/CREAT SERPL: 18 (ref 9–20)
CALCIUM SERPL-MCNC: 9.3 MG/DL (ref 8.6–10.4)
CHLORIDE SERPL-SCNC: 105 MMOL/L (ref 98–107)
CO2 SERPL-SCNC: 24 MMOL/L (ref 20–31)
CREAT SERPL-MCNC: 0.6 MG/DL (ref 0.5–0.9)
EOSINOPHIL # BLD: <0.03 K/UL (ref 0–0.44)
EOSINOPHILS RELATIVE PERCENT: 0 % (ref 1–4)
ERYTHROCYTE [DISTWIDTH] IN BLOOD BY AUTOMATED COUNT: 12.5 % (ref 11.8–14.4)
GFR, ESTIMATED: >90 ML/MIN/1.73M2
GLUCOSE SERPL-MCNC: 134 MG/DL (ref 74–99)
HCT VFR BLD AUTO: 40.1 % (ref 36.3–47.1)
HGB BLD-MCNC: 13.3 G/DL (ref 11.9–15.1)
IMM GRANULOCYTES # BLD AUTO: 0.05 K/UL (ref 0–0.3)
IMM GRANULOCYTES NFR BLD: 1 %
LYMPHOCYTES NFR BLD: 1.23 K/UL (ref 1.1–3.7)
LYMPHOCYTES RELATIVE PERCENT: 11 % (ref 24–43)
MCH RBC QN AUTO: 31.1 PG (ref 25.2–33.5)
MCHC RBC AUTO-ENTMCNC: 33.2 G/DL (ref 28.4–34.8)
MCV RBC AUTO: 93.9 FL (ref 82.6–102.9)
MONOCYTES NFR BLD: 0.58 K/UL (ref 0.1–1.2)
MONOCYTES NFR BLD: 5 % (ref 3–12)
NEUTROPHILS NFR BLD: 83 % (ref 36–65)
NEUTS SEG NFR BLD: 9.02 K/UL (ref 1.5–8.1)
NRBC BLD-RTO: 0 PER 100 WBC
PLATELET # BLD AUTO: 212 K/UL (ref 138–453)
PMV BLD AUTO: 8.9 FL (ref 8.1–13.5)
POTASSIUM SERPL-SCNC: 4.2 MMOL/L (ref 3.7–5.3)
PROT SERPL-MCNC: 6.9 G/DL (ref 6.6–8.7)
RBC # BLD AUTO: 4.27 M/UL (ref 3.95–5.11)
SODIUM SERPL-SCNC: 140 MMOL/L (ref 136–145)
WBC OTHER # BLD: 10.9 K/UL (ref 3.5–11.3)

## 2024-09-09 PROCEDURE — 80053 COMPREHEN METABOLIC PANEL: CPT

## 2024-09-09 PROCEDURE — 1200000000 HC SEMI PRIVATE

## 2024-09-09 PROCEDURE — 94669 MECHANICAL CHEST WALL OSCILL: CPT

## 2024-09-09 PROCEDURE — 97110 THERAPEUTIC EXERCISES: CPT

## 2024-09-09 PROCEDURE — 85025 COMPLETE CBC W/AUTO DIFF WBC: CPT

## 2024-09-09 PROCEDURE — 6370000000 HC RX 637 (ALT 250 FOR IP): Performed by: NURSE PRACTITIONER

## 2024-09-09 PROCEDURE — 6360000002 HC RX W HCPCS: Performed by: INTERNAL MEDICINE

## 2024-09-09 PROCEDURE — 2580000003 HC RX 258: Performed by: INTERNAL MEDICINE

## 2024-09-09 PROCEDURE — 6370000000 HC RX 637 (ALT 250 FOR IP): Performed by: INTERNAL MEDICINE

## 2024-09-09 PROCEDURE — 36415 COLL VENOUS BLD VENIPUNCTURE: CPT

## 2024-09-09 PROCEDURE — 94640 AIRWAY INHALATION TREATMENT: CPT

## 2024-09-09 PROCEDURE — 6370000000 HC RX 637 (ALT 250 FOR IP)

## 2024-09-09 PROCEDURE — 94761 N-INVAS EAR/PLS OXIMETRY MLT: CPT

## 2024-09-09 PROCEDURE — 97112 NEUROMUSCULAR REEDUCATION: CPT

## 2024-09-09 PROCEDURE — 94664 DEMO&/EVAL PT USE INHALER: CPT

## 2024-09-09 RX ORDER — SCOLOPAMINE TRANSDERMAL SYSTEM 1 MG/1
1 PATCH, EXTENDED RELEASE TRANSDERMAL
Status: DISCONTINUED | OUTPATIENT
Start: 2024-09-09 | End: 2024-09-10 | Stop reason: HOSPADM

## 2024-09-09 RX ADMIN — IPRATROPIUM BROMIDE AND ALBUTEROL SULFATE 1 DOSE: 2.5; .5 SOLUTION RESPIRATORY (INHALATION) at 20:32

## 2024-09-09 RX ADMIN — GABAPENTIN 300 MG: 300 CAPSULE ORAL at 20:01

## 2024-09-09 RX ADMIN — SODIUM CHLORIDE, PRESERVATIVE FREE 10 ML: 5 INJECTION INTRAVENOUS at 09:51

## 2024-09-09 RX ADMIN — TIMOLOL MALEATE 1 DROP: 5 SOLUTION OPHTHALMIC at 09:45

## 2024-09-09 RX ADMIN — IPRATROPIUM BROMIDE AND ALBUTEROL SULFATE 1 DOSE: 2.5; .5 SOLUTION RESPIRATORY (INHALATION) at 14:55

## 2024-09-09 RX ADMIN — LATANOPROST 2 DROP: 50 SOLUTION/ DROPS OPHTHALMIC at 20:04

## 2024-09-09 RX ADMIN — IPRATROPIUM BROMIDE AND ALBUTEROL SULFATE 1 DOSE: 2.5; .5 SOLUTION RESPIRATORY (INHALATION) at 09:16

## 2024-09-09 RX ADMIN — SODIUM CHLORIDE, PRESERVATIVE FREE 10 ML: 5 INJECTION INTRAVENOUS at 20:03

## 2024-09-09 RX ADMIN — EZETIMIBE 10 MG: 10 TABLET ORAL at 20:01

## 2024-09-09 RX ADMIN — MECLIZINE HYDROCHLORIDE 25 MG: 25 TABLET ORAL at 15:07

## 2024-09-09 RX ADMIN — ATORVASTATIN CALCIUM 40 MG: 40 TABLET, FILM COATED ORAL at 20:01

## 2024-09-09 RX ADMIN — ASPIRIN 81 MG: 81 TABLET, COATED ORAL at 20:01

## 2024-09-09 RX ADMIN — TIMOLOL MALEATE 1 DROP: 5 SOLUTION OPHTHALMIC at 20:04

## 2024-09-09 RX ADMIN — ENOXAPARIN SODIUM 40 MG: 100 INJECTION SUBCUTANEOUS at 09:45

## 2024-09-09 RX ADMIN — MECLIZINE HYDROCHLORIDE 25 MG: 25 TABLET ORAL at 20:01

## 2024-09-09 RX ADMIN — PANTOPRAZOLE SODIUM 40 MG: 40 TABLET, DELAYED RELEASE ORAL at 20:01

## 2024-09-09 RX ADMIN — DEXAMETHASONE SODIUM PHOSPHATE 6 MG: 10 INJECTION INTRAMUSCULAR; INTRAVENOUS at 15:07

## 2024-09-09 RX ADMIN — ROPINIROLE HYDROCHLORIDE 1 MG: 1 TABLET, FILM COATED ORAL at 17:34

## 2024-09-09 RX ADMIN — MECLIZINE HYDROCHLORIDE 25 MG: 25 TABLET ORAL at 09:45

## 2024-09-09 RX ADMIN — BENZOCAINE 6 MG-MENTHOL 10 MG LOZENGES 1 LOZENGE: at 20:01

## 2024-09-09 ASSESSMENT — PAIN SCALES - WONG BAKER: WONGBAKER_NUMERICALRESPONSE: NO HURT

## 2024-09-10 VITALS
HEART RATE: 78 BPM | DIASTOLIC BLOOD PRESSURE: 67 MMHG | RESPIRATION RATE: 18 BRPM | OXYGEN SATURATION: 95 % | BODY MASS INDEX: 22.68 KG/M2 | HEIGHT: 63 IN | WEIGHT: 128 LBS | TEMPERATURE: 97.9 F | SYSTOLIC BLOOD PRESSURE: 123 MMHG

## 2024-09-10 LAB
ALBUMIN SERPL-MCNC: 4 G/DL (ref 3.5–5.2)
ALBUMIN/GLOB SERPL: 1.2 {RATIO} (ref 1–2.5)
ALP SERPL-CCNC: 85 U/L (ref 35–104)
ALT SERPL-CCNC: 22 U/L (ref 10–35)
ANION GAP SERPL CALCULATED.3IONS-SCNC: 12 MMOL/L (ref 9–16)
AST SERPL-CCNC: 20 U/L (ref 10–35)
BASOPHILS # BLD: <0.03 K/UL (ref 0–0.2)
BASOPHILS NFR BLD: 0 % (ref 0–2)
BILIRUB SERPL-MCNC: <0.2 MG/DL (ref 0–1.2)
BUN SERPL-MCNC: 20 MG/DL (ref 8–23)
BUN/CREAT SERPL: 33 (ref 9–20)
CALCIUM SERPL-MCNC: 9.9 MG/DL (ref 8.6–10.4)
CHLORIDE SERPL-SCNC: 100 MMOL/L (ref 98–107)
CO2 SERPL-SCNC: 27 MMOL/L (ref 20–31)
CREAT SERPL-MCNC: 0.6 MG/DL (ref 0.5–0.9)
EOSINOPHIL # BLD: <0.03 K/UL (ref 0–0.44)
EOSINOPHILS RELATIVE PERCENT: 0 % (ref 1–4)
ERYTHROCYTE [DISTWIDTH] IN BLOOD BY AUTOMATED COUNT: 12.6 % (ref 11.8–14.4)
GFR, ESTIMATED: >90 ML/MIN/1.73M2
GLUCOSE SERPL-MCNC: 109 MG/DL (ref 74–99)
HCT VFR BLD AUTO: 41.6 % (ref 36.3–47.1)
HGB BLD-MCNC: 14 G/DL (ref 11.9–15.1)
IMM GRANULOCYTES # BLD AUTO: 0.04 K/UL (ref 0–0.3)
IMM GRANULOCYTES NFR BLD: 0 %
LYMPHOCYTES NFR BLD: 1.65 K/UL (ref 1.1–3.7)
LYMPHOCYTES RELATIVE PERCENT: 19 % (ref 24–43)
MCH RBC QN AUTO: 31.3 PG (ref 25.2–33.5)
MCHC RBC AUTO-ENTMCNC: 33.7 G/DL (ref 28.4–34.8)
MCV RBC AUTO: 92.9 FL (ref 82.6–102.9)
MONOCYTES NFR BLD: 0.45 K/UL (ref 0.1–1.2)
MONOCYTES NFR BLD: 5 % (ref 3–12)
NEUTROPHILS NFR BLD: 76 % (ref 36–65)
NEUTS SEG NFR BLD: 6.77 K/UL (ref 1.5–8.1)
NRBC BLD-RTO: 0 PER 100 WBC
PLATELET # BLD AUTO: 249 K/UL (ref 138–453)
PMV BLD AUTO: 8.7 FL (ref 8.1–13.5)
POTASSIUM SERPL-SCNC: 4.3 MMOL/L (ref 3.7–5.3)
PROT SERPL-MCNC: 7.5 G/DL (ref 6.6–8.7)
RBC # BLD AUTO: 4.48 M/UL (ref 3.95–5.11)
SODIUM SERPL-SCNC: 139 MMOL/L (ref 136–145)
WBC OTHER # BLD: 8.9 K/UL (ref 3.5–11.3)

## 2024-09-10 PROCEDURE — 94640 AIRWAY INHALATION TREATMENT: CPT

## 2024-09-10 PROCEDURE — 97116 GAIT TRAINING THERAPY: CPT

## 2024-09-10 PROCEDURE — 6370000000 HC RX 637 (ALT 250 FOR IP): Performed by: INTERNAL MEDICINE

## 2024-09-10 PROCEDURE — 2580000003 HC RX 258: Performed by: INTERNAL MEDICINE

## 2024-09-10 PROCEDURE — 80053 COMPREHEN METABOLIC PANEL: CPT

## 2024-09-10 PROCEDURE — 97110 THERAPEUTIC EXERCISES: CPT

## 2024-09-10 PROCEDURE — 94761 N-INVAS EAR/PLS OXIMETRY MLT: CPT

## 2024-09-10 PROCEDURE — 6360000002 HC RX W HCPCS: Performed by: INTERNAL MEDICINE

## 2024-09-10 PROCEDURE — 94664 DEMO&/EVAL PT USE INHALER: CPT

## 2024-09-10 PROCEDURE — 85025 COMPLETE CBC W/AUTO DIFF WBC: CPT

## 2024-09-10 PROCEDURE — 94669 MECHANICAL CHEST WALL OSCILL: CPT

## 2024-09-10 RX ORDER — SCOLOPAMINE TRANSDERMAL SYSTEM 1 MG/1
1 PATCH, EXTENDED RELEASE TRANSDERMAL
Qty: 3 PATCH | Refills: 1 | Status: SHIPPED | OUTPATIENT
Start: 2024-09-12

## 2024-09-10 RX ORDER — MECLIZINE HYDROCHLORIDE 25 MG/1
25 TABLET ORAL 3 TIMES DAILY
Qty: 30 TABLET | Refills: 0 | Status: SHIPPED | OUTPATIENT
Start: 2024-09-10 | End: 2024-09-20

## 2024-09-10 RX ADMIN — TIMOLOL MALEATE 1 DROP: 5 SOLUTION OPHTHALMIC at 08:23

## 2024-09-10 RX ADMIN — SODIUM CHLORIDE, PRESERVATIVE FREE 10 ML: 5 INJECTION INTRAVENOUS at 08:23

## 2024-09-10 RX ADMIN — IPRATROPIUM BROMIDE AND ALBUTEROL SULFATE 1 DOSE: 2.5; .5 SOLUTION RESPIRATORY (INHALATION) at 10:57

## 2024-09-10 RX ADMIN — ENOXAPARIN SODIUM 40 MG: 100 INJECTION SUBCUTANEOUS at 08:23

## 2024-09-10 RX ADMIN — MECLIZINE HYDROCHLORIDE 25 MG: 25 TABLET ORAL at 08:23

## 2024-09-10 RX ADMIN — ACETAMINOPHEN 650 MG: 325 TABLET ORAL at 05:44

## 2024-09-10 ASSESSMENT — PAIN SCALES - GENERAL: PAINLEVEL_OUTOF10: 2

## 2024-09-10 ASSESSMENT — PAIN DESCRIPTION - LOCATION: LOCATION: HEAD

## 2024-09-10 ASSESSMENT — PAIN DESCRIPTION - DESCRIPTORS: DESCRIPTORS: ACHING

## 2024-09-10 ASSESSMENT — PAIN DESCRIPTION - ORIENTATION: ORIENTATION: POSTERIOR;ANTERIOR

## 2024-11-22 ENCOUNTER — HOSPITAL ENCOUNTER (OUTPATIENT)
Dept: CT IMAGING | Age: 74
Discharge: HOME | End: 2024-11-22
Payer: MEDICARE

## 2024-11-22 DIAGNOSIS — J44.1 COPD WITH ACUTE EXACERBATION (HCC): ICD-10-CM

## 2024-11-22 PROCEDURE — 71250 CT THORAX DX C-: CPT

## 2024-11-25 NOTE — RESULT ENCOUNTER NOTE
Please call pt and inform them their lCT results are normal.  Need PFT for further eval prior to seeing pulmonary

## 2024-12-04 ENCOUNTER — OFFICE VISIT (OUTPATIENT)
Dept: PULMONOLOGY | Age: 74
End: 2024-12-04
Payer: MEDICARE

## 2024-12-04 VITALS
OXYGEN SATURATION: 95 % | RESPIRATION RATE: 16 BRPM | HEIGHT: 63 IN | TEMPERATURE: 97.8 F | SYSTOLIC BLOOD PRESSURE: 114 MMHG | WEIGHT: 127.4 LBS | BODY MASS INDEX: 22.57 KG/M2 | HEART RATE: 84 BPM | DIASTOLIC BLOOD PRESSURE: 74 MMHG

## 2024-12-04 DIAGNOSIS — Z87.891 FORMER MODERATE CIGARETTE SMOKER (10-19 PER DAY): Primary | ICD-10-CM

## 2024-12-04 DIAGNOSIS — J44.9 STAGE 2 MODERATE COPD BY GOLD CLASSIFICATION (HCC): ICD-10-CM

## 2024-12-04 PROCEDURE — 99214 OFFICE O/P EST MOD 30 MIN: CPT | Performed by: INTERNAL MEDICINE

## 2024-12-04 RX ORDER — ALBUTEROL SULFATE 90 UG/1
2 INHALANT RESPIRATORY (INHALATION) EVERY 6 HOURS PRN
Qty: 3 EACH | Refills: 3 | Status: SHIPPED | OUTPATIENT
Start: 2024-12-04

## 2024-12-04 RX ORDER — IPRATROPIUM BROMIDE AND ALBUTEROL SULFATE 2.5; .5 MG/3ML; MG/3ML
1 SOLUTION RESPIRATORY (INHALATION) EVERY 4 HOURS
Qty: 360 ML | Refills: 0 | Status: SHIPPED | OUTPATIENT
Start: 2024-12-04

## 2024-12-04 RX ORDER — FLUTICASONE PROPIONATE 220 UG/1
2 AEROSOL, METERED RESPIRATORY (INHALATION) 2 TIMES DAILY
Qty: 1 EACH | Refills: 2 | Status: SHIPPED | OUTPATIENT
Start: 2024-12-04 | End: 2025-03-04

## 2024-12-04 NOTE — PATIENT INSTRUCTIONS
SURVEY:    Thank you for allowing us to care for you today.    You may be receiving a survey from [facility] regarding your visit today- electronically or via mail.      Please help us by completing the survey as this will provide the needed feedback to ensure we are providing the very best care for you and your family.    If you cannot score us a very good on any question, please call the office to discuss how we could have made your experience a very good one.    Thank you.       STAFF:    Consuelo VILLALTA, Lorena LINDSEY, Valerie CRYSTAL CMA      CLINICAL STAFF:    Akila MCRAE LPN, Mayte TSAI LPN, Deb GALICIA LPN, Patsy VILLALTA

## 2024-12-04 NOTE — PROGRESS NOTES
annually.  Recommendations were given regarding pneumococcal, RSV and COVID-19 vaccination.    DECISION REGARDING PROCEDURE/SURGERY/HOSPITALIZATION:    All the questions that the patient had were answered to her satisfaction.  No follow-ups on file.  Thank you for having us involved in the care of your patient.  Please call us if you have any questions or concerns.    Familia Honeycutt MD  Pulmonary and Critical Care Medicine         The patient (or guardian, if applicable) and other individuals in attendance with the patient were advised that Artificial Intelligence will be utilized during this visit to record, process the conversation to generate a clinical note and to support improvement of the AI technology. The patient (or guardian, if applicable) and other individuals in attendance at the appointment consented to the use of AI, including the recording.      An electronic signature was used to authenticate this note.    While intent was to generate a document that actually reflects the content of the visit, the document can still have some errors including those of syntax and sound-a-like substitutions which may escape proof reading.  In such instances, actual meaning can be extrapolated by contextual diversion.

## 2024-12-04 NOTE — PROGRESS NOTES
use, (age 12 y+), 30mcg/0.3mL 06/15/2021, 07/06/2021, 07/21/2021, 08/11/2021    Pneumococcal, PCV-13, PREVNAR 13, (age 6w+), IM, 0.5mL 06/26/2019    Pneumococcal, PPSV23, PNEUMOVAX 23, (age 2y+), SC/IM, 0.5mL 07/27/2020    TDaP, ADACEL (age 10y-64y), BOOSTRIX (age 10y+), IM, 0.5mL 10/29/2020    Zoster Recombinant (Shingrix) 04/26/2021     Immunization records reviewed.  Recommended influenza vaccination in the fall annually.  Recommendations were given regarding pneumococcal, RSV and COVID-19 vaccination.      All the questions that the patient had were answered to her satisfaction.  Return in about 6 months (around 6/4/2025).  Thank you for having us involved in the care of your patient.  Please call us if you have any questions or concerns.    Familia Honeycutt MD  Pulmonary and Critical Care Medicine         The patient (or guardian, if applicable) and other individuals in attendance with the patient were advised that Artificial Intelligence will be utilized during this visit to record, process the conversation to generate a clinical note and to support improvement of the AI technology. The patient (or guardian, if applicable) and other individuals in attendance at the appointment consented to the use of AI, including the recording.      An electronic signature was used to authenticate this note.    While intent was to generate a document that actually reflects the content of the visit, the document can still have some errors including those of syntax and sound-a-like substitutions which may escape proof reading.  In such instances, actual meaning can be extrapolated by contextual diversion.

## 2024-12-05 ENCOUNTER — OFFICE VISIT (OUTPATIENT)
Dept: CARDIOLOGY | Age: 74
End: 2024-12-05
Payer: MEDICARE

## 2024-12-05 ENCOUNTER — HOSPITAL ENCOUNTER (EMERGENCY)
Age: 74
Discharge: HOME | End: 2024-12-05
Attending: EMERGENCY MEDICINE
Payer: MEDICARE

## 2024-12-05 ENCOUNTER — APPOINTMENT (OUTPATIENT)
Dept: CT IMAGING | Age: 74
End: 2024-12-05
Payer: MEDICARE

## 2024-12-05 VITALS
DIASTOLIC BLOOD PRESSURE: 79 MMHG | WEIGHT: 128.6 LBS | OXYGEN SATURATION: 98 % | HEIGHT: 63 IN | HEART RATE: 78 BPM | SYSTOLIC BLOOD PRESSURE: 117 MMHG | RESPIRATION RATE: 18 BRPM | BODY MASS INDEX: 22.79 KG/M2

## 2024-12-05 VITALS
OXYGEN SATURATION: 98 % | DIASTOLIC BLOOD PRESSURE: 95 MMHG | SYSTOLIC BLOOD PRESSURE: 172 MMHG | RESPIRATION RATE: 16 BRPM | HEART RATE: 80 BPM | TEMPERATURE: 98.1 F

## 2024-12-05 DIAGNOSIS — R06.02 SOB (SHORTNESS OF BREATH): ICD-10-CM

## 2024-12-05 DIAGNOSIS — R42 DIZZINESS: ICD-10-CM

## 2024-12-05 DIAGNOSIS — E78.2 MIXED HYPERLIPIDEMIA: ICD-10-CM

## 2024-12-05 DIAGNOSIS — R07.9 NONSPECIFIC CHEST PAIN: Primary | ICD-10-CM

## 2024-12-05 DIAGNOSIS — R07.89 CHEST DISCOMFORT: ICD-10-CM

## 2024-12-05 DIAGNOSIS — Z95.820 S/P ANGIOPLASTY WITH STENT: ICD-10-CM

## 2024-12-05 DIAGNOSIS — I25.10 ASHD (ARTERIOSCLEROTIC HEART DISEASE): Chronic | ICD-10-CM

## 2024-12-05 DIAGNOSIS — I10 ESSENTIAL HYPERTENSION: ICD-10-CM

## 2024-12-05 DIAGNOSIS — I35.1 MODERATE AORTIC REGURGITATION: ICD-10-CM

## 2024-12-05 DIAGNOSIS — R42 LIGHTHEADED: ICD-10-CM

## 2024-12-05 DIAGNOSIS — G62.9 NEUROPATHY: ICD-10-CM

## 2024-12-05 DIAGNOSIS — R29.898 ARM HEAVINESS: ICD-10-CM

## 2024-12-05 LAB
ANION GAP SERPL CALCULATED.3IONS-SCNC: 9 MMOL/L (ref 9–16)
BACTERIA URNS QL MICRO: ABNORMAL
BASOPHILS # BLD: 0.04 K/UL (ref 0–0.2)
BASOPHILS NFR BLD: 1 % (ref 0–2)
BILIRUB UR QL STRIP: NEGATIVE
BUN SERPL-MCNC: 15 MG/DL (ref 8–23)
BUN/CREAT SERPL: 21 (ref 9–20)
CALCIUM SERPL-MCNC: 9.4 MG/DL (ref 8.6–10.4)
CHLORIDE SERPL-SCNC: 103 MMOL/L (ref 98–107)
CLARITY UR: CLEAR
CO2 SERPL-SCNC: 27 MMOL/L (ref 20–31)
COLOR UR: YELLOW
CREAT SERPL-MCNC: 0.7 MG/DL (ref 0.5–0.9)
EKG ATRIAL RATE: 81 BPM
EKG P AXIS: 78 DEGREES
EKG P-R INTERVAL: 168 MS
EKG Q-T INTERVAL: 368 MS
EKG QRS DURATION: 84 MS
EKG QTC CALCULATION (BAZETT): 427 MS
EKG R AXIS: 8 DEGREES
EKG T AXIS: 55 DEGREES
EKG VENTRICULAR RATE: 81 BPM
EOSINOPHIL # BLD: 0.23 K/UL (ref 0–0.44)
EOSINOPHILS RELATIVE PERCENT: 4 % (ref 1–4)
EPI CELLS #/AREA URNS HPF: ABNORMAL /HPF (ref 0–25)
ERYTHROCYTE [DISTWIDTH] IN BLOOD BY AUTOMATED COUNT: 12.2 % (ref 11.8–14.4)
GFR, ESTIMATED: >90 ML/MIN/1.73M2
GLUCOSE SERPL-MCNC: 88 MG/DL (ref 74–99)
GLUCOSE UR STRIP-MCNC: NEGATIVE MG/DL
HCT VFR BLD AUTO: 39.9 % (ref 36.3–47.1)
HGB BLD-MCNC: 13.4 G/DL (ref 11.9–15.1)
HGB UR QL STRIP.AUTO: ABNORMAL
IMM GRANULOCYTES # BLD AUTO: <0.03 K/UL (ref 0–0.3)
IMM GRANULOCYTES NFR BLD: 0 %
KETONES UR STRIP-MCNC: NEGATIVE MG/DL
LEUKOCYTE ESTERASE UR QL STRIP: NEGATIVE
LYMPHOCYTES NFR BLD: 1.57 K/UL (ref 1.1–3.7)
LYMPHOCYTES RELATIVE PERCENT: 30 % (ref 24–43)
MCH RBC QN AUTO: 31.9 PG (ref 25.2–33.5)
MCHC RBC AUTO-ENTMCNC: 33.6 G/DL (ref 28.4–34.8)
MCV RBC AUTO: 95 FL (ref 82.6–102.9)
MONOCYTES NFR BLD: 0.63 K/UL (ref 0.1–1.2)
MONOCYTES NFR BLD: 12 % (ref 3–12)
NEUTROPHILS NFR BLD: 53 % (ref 36–65)
NEUTS SEG NFR BLD: 2.77 K/UL (ref 1.5–8.1)
NITRITE UR QL STRIP: NEGATIVE
NRBC BLD-RTO: 0 PER 100 WBC
PH UR STRIP: 6 [PH] (ref 5–9)
PLATELET # BLD AUTO: 281 K/UL (ref 138–453)
PMV BLD AUTO: 8.4 FL (ref 8.1–13.5)
POTASSIUM SERPL-SCNC: 4.4 MMOL/L (ref 3.7–5.3)
PROT UR STRIP-MCNC: NEGATIVE MG/DL
RBC # BLD AUTO: 4.2 M/UL (ref 3.95–5.11)
RBC #/AREA URNS HPF: ABNORMAL /HPF (ref 0–2)
SODIUM SERPL-SCNC: 139 MMOL/L (ref 136–145)
SP GR UR STRIP: 1.01 (ref 1.01–1.02)
TROPONIN I SERPL HS-MCNC: 7 NG/L (ref 0–14)
UROBILINOGEN UR STRIP-ACNC: NORMAL EU/DL (ref 0–1)
WBC #/AREA URNS HPF: ABNORMAL /HPF (ref 0–5)
WBC OTHER # BLD: 5.3 K/UL (ref 3.5–11.3)

## 2024-12-05 PROCEDURE — G8427 DOCREV CUR MEDS BY ELIG CLIN: HCPCS | Performed by: PHYSICIAN ASSISTANT

## 2024-12-05 PROCEDURE — 81001 URINALYSIS AUTO W/SCOPE: CPT

## 2024-12-05 PROCEDURE — 93005 ELECTROCARDIOGRAM TRACING: CPT | Performed by: EMERGENCY MEDICINE

## 2024-12-05 PROCEDURE — G8399 PT W/DXA RESULTS DOCUMENT: HCPCS | Performed by: PHYSICIAN ASSISTANT

## 2024-12-05 PROCEDURE — 93010 ELECTROCARDIOGRAM REPORT: CPT | Performed by: INTERNAL MEDICINE

## 2024-12-05 PROCEDURE — 80048 BASIC METABOLIC PNL TOTAL CA: CPT

## 2024-12-05 PROCEDURE — 99215 OFFICE O/P EST HI 40 MIN: CPT | Performed by: PHYSICIAN ASSISTANT

## 2024-12-05 PROCEDURE — 3074F SYST BP LT 130 MM HG: CPT | Performed by: PHYSICIAN ASSISTANT

## 2024-12-05 PROCEDURE — 99211 OFF/OP EST MAY X REQ PHY/QHP: CPT | Performed by: PHYSICIAN ASSISTANT

## 2024-12-05 PROCEDURE — 93010 ELECTROCARDIOGRAM REPORT: CPT | Performed by: PHYSICIAN ASSISTANT

## 2024-12-05 PROCEDURE — 1159F MED LIST DOCD IN RCRD: CPT | Performed by: PHYSICIAN ASSISTANT

## 2024-12-05 PROCEDURE — G8484 FLU IMMUNIZE NO ADMIN: HCPCS | Performed by: PHYSICIAN ASSISTANT

## 2024-12-05 PROCEDURE — G8420 CALC BMI NORM PARAMETERS: HCPCS | Performed by: PHYSICIAN ASSISTANT

## 2024-12-05 PROCEDURE — 3078F DIAST BP <80 MM HG: CPT | Performed by: PHYSICIAN ASSISTANT

## 2024-12-05 PROCEDURE — 84484 ASSAY OF TROPONIN QUANT: CPT

## 2024-12-05 PROCEDURE — 1090F PRES/ABSN URINE INCON ASSESS: CPT | Performed by: PHYSICIAN ASSISTANT

## 2024-12-05 PROCEDURE — 1123F ACP DISCUSS/DSCN MKR DOCD: CPT | Performed by: PHYSICIAN ASSISTANT

## 2024-12-05 PROCEDURE — 93005 ELECTROCARDIOGRAM TRACING: CPT | Performed by: PHYSICIAN ASSISTANT

## 2024-12-05 PROCEDURE — 3017F COLORECTAL CA SCREEN DOC REV: CPT | Performed by: PHYSICIAN ASSISTANT

## 2024-12-05 PROCEDURE — 1036F TOBACCO NON-USER: CPT | Performed by: PHYSICIAN ASSISTANT

## 2024-12-05 PROCEDURE — 85025 COMPLETE CBC W/AUTO DIFF WBC: CPT

## 2024-12-05 PROCEDURE — 70450 CT HEAD/BRAIN W/O DYE: CPT

## 2024-12-05 ASSESSMENT — ENCOUNTER SYMPTOMS
PHOTOPHOBIA: 0
BACK PAIN: 0
EYE REDNESS: 0
SHORTNESS OF BREATH: 0
SORE THROAT: 0
ABDOMINAL DISTENTION: 0
EYE PAIN: 0

## 2024-12-05 ASSESSMENT — PAIN - FUNCTIONAL ASSESSMENT: PAIN_FUNCTIONAL_ASSESSMENT: NONE - DENIES PAIN

## 2024-12-05 ASSESSMENT — LIFESTYLE VARIABLES
HOW OFTEN DO YOU HAVE A DRINK CONTAINING ALCOHOL: NEVER
HOW MANY STANDARD DRINKS CONTAINING ALCOHOL DO YOU HAVE ON A TYPICAL DAY: PATIENT DOES NOT DRINK

## 2024-12-05 NOTE — PROGRESS NOTES
I diastolic dysfunction with normal LAP. Aortic Valve: Mild to moderate regurgitation. Mitral Valve: Mildly calcified leaflet, at the posterior leaflet. Mild regurgitation. Image quality is adequate.    Stress test completed on 2/16/2024: Overall, these results are most consistent with a low risk for significant ischemia.     EKG completed today in office on 12/5/2024:     Ms. Marquez is here today for a follow up. She has been having some bothersome issues. Over the last couple of weeks, she started having heaviness in her arms and hands. Her hand involuntary curls up and she is unable to uncurl it on her own without using her other hand to move it. Her vision isn't right any ways but her left eye is worse. She just doesn't feel right, her left side is worse. It happens at least once a week. Her arm feels like a tourniqet is on her arm and that is how it felt when she had stenting completed before.     She does have some pain in her chest, a pinch a couple times a week. It lasts only seconds at a time. Nothing specific brings on that discomfort. Nitro does help with it, she only has to take 1 pill and it stops it. She takes Nitro once a month. It is happening more often since her last visit and worse in intensity. It does stop her from doing what she is doing. Her breathing has been worse, she did just follow with pulmonology yesterday.     She denies having any heart palpations. No bleeding problems, bowel issues, problems with her medications or any other concerns at this time. No cough, fever or chills. No abdominal pain, nausea or vomiting.     History of Present Illness  The patient is a 74-year-old female here for a follow-up appointment.    She has a past medical history of angina, atherosclerotic heart disease, moderate aortic regurgitation, hypertension, and hyperlipidemia. She has a history of stents placed in 2012 and a normal heart catheterization on 04/12/2022. She was unable to tolerate beta blockers

## 2024-12-05 NOTE — ED PROVIDER NOTES
dictations but occasionally words are mis-transcribed.)    Adina Bullock DO (electronically signed)  Attending Emergency Physician            Adina Bullock,   12/05/24 1400

## 2024-12-05 NOTE — DISCHARGE INSTRUCTIONS
Please follow-up with your neurologist tomorrow at your scheduled appointment to discuss your symptoms with them.  Also recommend following up with rheumatology to discuss your symptoms.  Return if you have any worsening symptoms.

## 2024-12-08 PROBLEM — R06.02 SHORTNESS OF BREATH ON EXERTION: Status: RESOLVED | Noted: 2017-12-18 | Resolved: 2024-12-08

## 2024-12-08 PROBLEM — Z87.891 FORMER MODERATE CIGARETTE SMOKER (10-19 PER DAY): Status: ACTIVE | Noted: 2024-12-08

## 2024-12-10 DIAGNOSIS — J44.9 STAGE 2 MODERATE COPD BY GOLD CLASSIFICATION (HCC): ICD-10-CM

## 2024-12-10 RX ORDER — FLUTICASONE PROPIONATE 220 UG/1
2 AEROSOL, METERED RESPIRATORY (INHALATION) 2 TIMES DAILY
Qty: 12 G | Refills: 2 | Status: SHIPPED | OUTPATIENT
Start: 2024-12-10 | End: 2024-12-12 | Stop reason: CLARIF

## 2024-12-12 ENCOUNTER — TELEPHONE (OUTPATIENT)
Dept: PULMONOLOGY | Age: 74
End: 2024-12-12

## 2024-12-12 DIAGNOSIS — J44.9 STAGE 2 MODERATE COPD BY GOLD CLASSIFICATION (HCC): Primary | ICD-10-CM

## 2024-12-12 RX ORDER — FLUTICASONE FUROATE 200 UG/1
1 POWDER RESPIRATORY (INHALATION) DAILY
Qty: 30 EACH | Refills: 2 | Status: SHIPPED | OUTPATIENT
Start: 2024-12-12 | End: 2025-03-12

## 2024-12-12 NOTE — TELEPHONE ENCOUNTER
Hilario stapleton from [facility] called and said the Flovent inhaler is not covered by her insurance. Arnuity Ellipta 50 mcg,100 mcg or 200mcg inhaler is covered. Please advise.

## 2024-12-16 ENCOUNTER — HOSPITAL ENCOUNTER (OUTPATIENT)
Dept: NUCLEAR MEDICINE | Age: 74
Discharge: HOME | End: 2024-12-18
Payer: MEDICARE

## 2024-12-16 ENCOUNTER — HOSPITAL ENCOUNTER (OUTPATIENT)
Age: 74
Discharge: HOME | End: 2024-12-18
Payer: MEDICARE

## 2024-12-16 ENCOUNTER — HOSPITAL ENCOUNTER (OUTPATIENT)
Age: 74
Discharge: HOME | End: 2024-12-16
Payer: MEDICARE

## 2024-12-16 ENCOUNTER — APPOINTMENT (OUTPATIENT)
Age: 74
End: 2024-12-16
Payer: MEDICARE

## 2024-12-16 DIAGNOSIS — R42 DIZZINESS: ICD-10-CM

## 2024-12-16 DIAGNOSIS — R06.02 SOB (SHORTNESS OF BREATH): ICD-10-CM

## 2024-12-16 DIAGNOSIS — I25.10 ASHD (ARTERIOSCLEROTIC HEART DISEASE): Chronic | ICD-10-CM

## 2024-12-16 DIAGNOSIS — R42 LIGHTHEADED: ICD-10-CM

## 2024-12-16 DIAGNOSIS — I35.1 MODERATE AORTIC REGURGITATION: ICD-10-CM

## 2024-12-16 DIAGNOSIS — Z95.820 S/P ANGIOPLASTY WITH STENT: ICD-10-CM

## 2024-12-16 DIAGNOSIS — E78.2 MIXED HYPERLIPIDEMIA: ICD-10-CM

## 2024-12-16 DIAGNOSIS — R07.89 CHEST DISCOMFORT: ICD-10-CM

## 2024-12-16 DIAGNOSIS — R29.898 ARM HEAVINESS: ICD-10-CM

## 2024-12-16 DIAGNOSIS — I10 ESSENTIAL HYPERTENSION: ICD-10-CM

## 2024-12-16 LAB
FERRITIN SERPL-MCNC: 204 NG/ML
FOLATE SERPL-MCNC: 35.7 NG/ML (ref 4.8–24.2)
HCYS SERPL-SCNC: 8.5 UMOL/L (ref 0–15)
MAGNESIUM SERPL-MCNC: 1.9 MG/DL (ref 1.6–2.4)
NUC REST EJECTION FRACTION: 79 %
STRESS BASELINE DIAS BP: 78 MMHG
STRESS BASELINE HR: 70 BPM
STRESS BASELINE SYS BP: 136 MMHG
STRESS ESTIMATED WORKLOAD: 1 METS
STRESS PEAK DIAS BP: 64 MMHG
STRESS PEAK SYS BP: 116 MMHG
STRESS PERCENT HR ACHIEVED: 63 %
STRESS POST PEAK HR: 92 BPM
STRESS RATE PRESSURE PRODUCT: NORMAL BPM*MMHG
STRESS TARGET HR: 146 BPM
TID: 1
TSH SERPL DL<=0.05 MIU/L-ACNC: 2.06 UIU/ML (ref 0.27–4.2)
VIT B12 SERPL-MCNC: 571 PG/ML (ref 232–1245)

## 2024-12-16 PROCEDURE — 84207 ASSAY OF VITAMIN B-6: CPT

## 2024-12-16 PROCEDURE — 93247 EXT ECG>7D<15D SCAN A/R: CPT

## 2024-12-16 PROCEDURE — 6360000002 HC RX W HCPCS: Performed by: FAMILY MEDICINE

## 2024-12-16 PROCEDURE — 93016 CV STRESS TEST SUPVJ ONLY: CPT | Performed by: INTERNAL MEDICINE

## 2024-12-16 PROCEDURE — 82746 ASSAY OF FOLIC ACID SERUM: CPT

## 2024-12-16 PROCEDURE — 84443 ASSAY THYROID STIM HORMONE: CPT

## 2024-12-16 PROCEDURE — 78452 HT MUSCLE IMAGE SPECT MULT: CPT

## 2024-12-16 PROCEDURE — 83090 ASSAY OF HOMOCYSTEINE: CPT

## 2024-12-16 PROCEDURE — 82607 VITAMIN B-12: CPT

## 2024-12-16 PROCEDURE — 83735 ASSAY OF MAGNESIUM: CPT

## 2024-12-16 PROCEDURE — A9500 TC99M SESTAMIBI: HCPCS | Performed by: PHYSICIAN ASSISTANT

## 2024-12-16 PROCEDURE — 3430000000 HC RX DIAGNOSTIC RADIOPHARMACEUTICAL: Performed by: PHYSICIAN ASSISTANT

## 2024-12-16 PROCEDURE — 93018 CV STRESS TEST I&R ONLY: CPT | Performed by: INTERNAL MEDICINE

## 2024-12-16 PROCEDURE — 78452 HT MUSCLE IMAGE SPECT MULT: CPT | Performed by: INTERNAL MEDICINE

## 2024-12-16 PROCEDURE — 36415 COLL VENOUS BLD VENIPUNCTURE: CPT

## 2024-12-16 PROCEDURE — 82652 VIT D 1 25-DIHYDROXY: CPT

## 2024-12-16 PROCEDURE — 82728 ASSAY OF FERRITIN: CPT

## 2024-12-16 PROCEDURE — 83921 ORGANIC ACID SINGLE QUANT: CPT

## 2024-12-16 RX ORDER — TETRAKIS(2-METHOXYISOBUTYLISOCYANIDE)COPPER(I) TETRAFLUOROBORATE 1 MG/ML
10 INJECTION, POWDER, LYOPHILIZED, FOR SOLUTION INTRAVENOUS
Status: COMPLETED | OUTPATIENT
Start: 2024-12-16 | End: 2024-12-16

## 2024-12-16 RX ORDER — REGADENOSON 0.08 MG/ML
0.4 INJECTION, SOLUTION INTRAVENOUS
Status: COMPLETED | OUTPATIENT
Start: 2024-12-16 | End: 2024-12-16

## 2024-12-16 RX ORDER — TETRAKIS(2-METHOXYISOBUTYLISOCYANIDE)COPPER(I) TETRAFLUOROBORATE 1 MG/ML
30 INJECTION, POWDER, LYOPHILIZED, FOR SOLUTION INTRAVENOUS
Status: COMPLETED | OUTPATIENT
Start: 2024-12-16 | End: 2024-12-16

## 2024-12-16 RX ADMIN — Medication 30 MILLICURIE: at 10:32

## 2024-12-16 RX ADMIN — REGADENOSON 0.4 MG: 0.08 INJECTION, SOLUTION INTRAVENOUS at 10:37

## 2024-12-16 RX ADMIN — Medication 10 MILLICURIE: at 09:16

## 2024-12-17 ENCOUNTER — TELEPHONE (OUTPATIENT)
Dept: CARDIOLOGY | Age: 74
End: 2024-12-17

## 2024-12-17 NOTE — TELEPHONE ENCOUNTER
----- Message from Kristyn Garcia PA-C sent at 12/17/2024  9:14 AM EST -----  Please let them know their stress test looked good, it was low risk. Will discuss at follow up. Thanks.

## 2024-12-18 LAB — 1,25(OH)2D3 SERPL-MCNC: 41.3 PG/ML (ref 19.9–79.3)

## 2024-12-19 LAB
METHYLMALONATE SERPL-SCNC: 0.18 UMOL/L (ref 0–0.4)
PYRIDOXAL PHOS SERPL-SCNC: 24.2 NMOL/L (ref 20–125)

## 2025-01-23 ENCOUNTER — HOSPITAL ENCOUNTER (OUTPATIENT)
Dept: PULMONOLOGY | Age: 75
Discharge: HOME | End: 2025-01-23
Payer: MEDICARE

## 2025-01-23 DIAGNOSIS — J44.1 COPD WITH ACUTE EXACERBATION (HCC): ICD-10-CM

## 2025-01-23 PROCEDURE — 94060 EVALUATION OF WHEEZING: CPT

## 2025-01-23 PROCEDURE — 94664 DEMO&/EVAL PT USE INHALER: CPT

## 2025-01-23 PROCEDURE — 94729 DIFFUSING CAPACITY: CPT

## 2025-01-23 PROCEDURE — 6370000000 HC RX 637 (ALT 250 FOR IP): Performed by: STUDENT IN AN ORGANIZED HEALTH CARE EDUCATION/TRAINING PROGRAM

## 2025-01-23 PROCEDURE — 94726 PLETHYSMOGRAPHY LUNG VOLUMES: CPT

## 2025-01-23 RX ORDER — ALBUTEROL SULFATE 90 UG/1
4 INHALANT RESPIRATORY (INHALATION) ONCE
Status: COMPLETED | OUTPATIENT
Start: 2025-01-23 | End: 2025-01-23

## 2025-01-23 RX ADMIN — ALBUTEROL SULFATE 4 PUFF: 90 AEROSOL, METERED RESPIRATORY (INHALATION) at 11:11

## 2025-01-29 LAB
DLCO %PRED: NORMAL
DLCO PRED: NORMAL
DLCO/VA %PRED: NORMAL
DLCO/VA PRED: NORMAL
DLCO/VA: NORMAL
DLCO: NORMAL
EXPIRATORY TIME-POST: NORMAL
EXPIRATORY TIME: NORMAL
FEF 25-75 %CHNG: NORMAL
FEF 25-75 POST %PRED: NORMAL
FEF 25-75% %PRED-PRE: NORMAL
FEF 25-75% PRED: NORMAL
FEF 25-75-POST: NORMAL
FEF 25-75-PRE: NORMAL
FEV1 %PRED-POST: NORMAL
FEV1 %PRED-PRE: NORMAL
FEV1 PRED: NORMAL
FEV1-POST: NORMAL
FEV1-PRE: NORMAL
FEV1/FVC %PRED-POST: NORMAL
FEV1/FVC %PRED-PRE: NORMAL
FEV1/FVC PRED: NORMAL
FEV1/FVC-POST: NORMAL
FEV1/FVC-PRE: NORMAL
FVC %PRED-POST: NORMAL
FVC %PRED-PRE: NORMAL
FVC PRED: NORMAL
FVC-POST: NORMAL
FVC-PRE: NORMAL
GAW %PRED: NORMAL
GAW PRED: NORMAL
GAW: NORMAL
IC PRE %PRED: NORMAL
IC PRED: NORMAL
IC: NORMAL
MEP: NORMAL
MIP: NORMAL
MVV %PRED-PRE: NORMAL
MVV PRED: NORMAL
MVV-PRE: NORMAL
PEF %PRED-POST: NORMAL
PEF %PRED-PRE: NORMAL
PEF PRED: NORMAL
PEF%CHNG: NORMAL
PEF-POST: NORMAL
PEF-PRE: NORMAL
RAW %PRED: NORMAL
RAW PRED: NORMAL
RAW: NORMAL
RV PRE %PRED: NORMAL
RV PRED: NORMAL
RV: NORMAL
SVC %PRED: NORMAL
SVC PRED: NORMAL
SVC: NORMAL
TLC PRE %PRED: NORMAL
TLC PRED: NORMAL
TLC: NORMAL
VA %PRED: NORMAL
VA PRED: NORMAL
VA: NORMAL
VTG %PRED: NORMAL
VTG PRED: NORMAL
VTG: NORMAL

## 2025-01-31 ENCOUNTER — OFFICE VISIT (OUTPATIENT)
Dept: CARDIOLOGY | Age: 75
End: 2025-01-31
Payer: MEDICARE

## 2025-01-31 VITALS
HEART RATE: 77 BPM | WEIGHT: 128.8 LBS | RESPIRATION RATE: 18 BRPM | OXYGEN SATURATION: 96 % | HEIGHT: 63 IN | DIASTOLIC BLOOD PRESSURE: 76 MMHG | BODY MASS INDEX: 22.82 KG/M2 | SYSTOLIC BLOOD PRESSURE: 125 MMHG

## 2025-01-31 DIAGNOSIS — I35.1 MODERATE AORTIC VALVE REGURGITATION: ICD-10-CM

## 2025-01-31 DIAGNOSIS — E78.5 HYPERLIPIDEMIA WITH TARGET LDL LESS THAN 70: Chronic | ICD-10-CM

## 2025-01-31 DIAGNOSIS — I25.10 ASHD (ARTERIOSCLEROTIC HEART DISEASE): Chronic | ICD-10-CM

## 2025-01-31 DIAGNOSIS — I10 ESSENTIAL HYPERTENSION: ICD-10-CM

## 2025-01-31 DIAGNOSIS — R07.89 NON-CARDIAC CHEST PAIN: Primary | ICD-10-CM

## 2025-01-31 PROCEDURE — G8427 DOCREV CUR MEDS BY ELIG CLIN: HCPCS | Performed by: FAMILY MEDICINE

## 2025-01-31 PROCEDURE — 99213 OFFICE O/P EST LOW 20 MIN: CPT | Performed by: FAMILY MEDICINE

## 2025-01-31 PROCEDURE — 1090F PRES/ABSN URINE INCON ASSESS: CPT | Performed by: FAMILY MEDICINE

## 2025-01-31 PROCEDURE — 3017F COLORECTAL CA SCREEN DOC REV: CPT | Performed by: FAMILY MEDICINE

## 2025-01-31 PROCEDURE — 1123F ACP DISCUSS/DSCN MKR DOCD: CPT | Performed by: FAMILY MEDICINE

## 2025-01-31 PROCEDURE — G8420 CALC BMI NORM PARAMETERS: HCPCS | Performed by: FAMILY MEDICINE

## 2025-01-31 PROCEDURE — 1036F TOBACCO NON-USER: CPT | Performed by: FAMILY MEDICINE

## 2025-01-31 PROCEDURE — 3078F DIAST BP <80 MM HG: CPT | Performed by: FAMILY MEDICINE

## 2025-01-31 PROCEDURE — 3074F SYST BP LT 130 MM HG: CPT | Performed by: FAMILY MEDICINE

## 2025-01-31 PROCEDURE — 1159F MED LIST DOCD IN RCRD: CPT | Performed by: FAMILY MEDICINE

## 2025-01-31 PROCEDURE — G8399 PT W/DXA RESULTS DOCUMENT: HCPCS | Performed by: FAMILY MEDICINE

## 2025-01-31 NOTE — PROGRESS NOTES
current or recent bleeding problems including a history of a GI bleed, ulcers, recent or upcoming surgeries, blood in her stool or black tarry stools or blood in her urine.    Past Medical History:   Diagnosis Date    Arthritis     CAD (coronary artery disease) 4/11, 9/12    4 total stents. 2 Stents in RCA, 1 in small OM1.    COPD (chronic obstructive pulmonary disease) ([facility])     MHT pulmonary    Glaucoma     H/O cardiovascular stress test 5/22/13, 6/25/14    Relatively normal without evidence of significant ischemia or infarction.  global LV systolic function was normal w/o regional wall motion abnormalities.  No significant EKG evidence of ischemia during monitoring w/o significant associated arrhythmias. Duke score is 5.     H/O cardiovascular stress test 12/28/2017    Larglely normal myocardial perfusion imaging with soft tissue artifact but w/o evidence of significant myocardial ischemia or infarction. results are most consistent with low/intermediate risk for significant CAD    H/O echocardiogram 5/21/13, 6/24/14    EF>55%, mild concentric LVH    History of echocardiogram 11/27/2018    EF of 60%. evidence of moderate diastolic dysfunction is seen.     Hx of cardiac catheterization 08/09/2018    MIld CAD w/o any significant focal stenosis with a normal LV end diastolic pressure LVEDP interestingly the pt developed pretty pronounced ST depression on Lt main engagment with mod chest pressure which resolved o disengagement of the LT main    Hx of heart artery stent 10/06/2011    Lesion of Prox RCA 75% stenosis-Drug Eluting stent to RCA    Hx of percutaneous left heart catheterization 04/07/2011    LMCA, LAD & RAMUS- normal, LCX-lesion on Mid CX 40% stenosis, RCA-Patent stent, EF 60%    Hx of percutaneous left heart catheterization 04/11/2012    Normal renals, Patent RCA stents with mid LCX stenosis IVUS suggest 60-70% stenosis and due to continues ischemia, a LISA was inserted    Hx of percutaneous left heart

## 2025-02-05 ENCOUNTER — HOSPITAL ENCOUNTER (OUTPATIENT)
Age: 75
Discharge: HOME | End: 2025-02-05
Payer: MEDICARE

## 2025-02-05 ENCOUNTER — HOSPITAL ENCOUNTER (OUTPATIENT)
Dept: GENERAL RADIOLOGY | Age: 75
Discharge: HOME | End: 2025-02-07
Payer: MEDICARE

## 2025-02-05 ENCOUNTER — HOSPITAL ENCOUNTER (OUTPATIENT)
Age: 75
Discharge: HOME | End: 2025-02-07
Payer: MEDICARE

## 2025-02-05 DIAGNOSIS — R52 BODY ACHES: ICD-10-CM

## 2025-02-05 DIAGNOSIS — R05.1 ACUTE COUGH: ICD-10-CM

## 2025-02-05 LAB
FLUAV AG SPEC QL: POSITIVE
FLUBV AG SPEC QL: NEGATIVE

## 2025-02-05 PROCEDURE — 71046 X-RAY EXAM CHEST 2 VIEWS: CPT

## 2025-02-05 PROCEDURE — 87804 INFLUENZA ASSAY W/OPTIC: CPT

## 2025-02-14 ENCOUNTER — TELEPHONE (OUTPATIENT)
Dept: CARDIOLOGY | Age: 75
End: 2025-02-14

## 2025-02-14 NOTE — TELEPHONE ENCOUNTER
----- Message from Kristyn Garcia PA-C sent at 2/13/2025  9:35 AM EST -----  Please let them know that their CAM monitor was overall unremarkable. We will discuss at their follow up appointment.

## 2025-02-16 DIAGNOSIS — M79.602 PAIN OF LEFT UPPER EXTREMITY: ICD-10-CM

## 2025-02-16 DIAGNOSIS — Z95.820 S/P ANGIOPLASTY WITH STENT: ICD-10-CM

## 2025-02-16 DIAGNOSIS — E78.2 MIXED HYPERLIPIDEMIA: ICD-10-CM

## 2025-02-16 DIAGNOSIS — I10 ESSENTIAL HYPERTENSION: ICD-10-CM

## 2025-02-16 DIAGNOSIS — I25.10 CORONARY ARTERY DISEASE INVOLVING NATIVE CORONARY ARTERY OF NATIVE HEART WITHOUT ANGINA PECTORIS: ICD-10-CM

## 2025-02-16 DIAGNOSIS — I35.1 MODERATE AORTIC REGURGITATION: ICD-10-CM

## 2025-02-17 RX ORDER — EZETIMIBE 10 MG/1
10 TABLET ORAL DAILY
Qty: 90 TABLET | Refills: 3 | Status: SHIPPED | OUTPATIENT
Start: 2025-02-17

## 2025-02-24 DIAGNOSIS — J44.9 STAGE 2 MODERATE COPD BY GOLD CLASSIFICATION (HCC): ICD-10-CM

## 2025-02-24 RX ORDER — ALBUTEROL SULFATE 0.83 MG/ML
2.5 SOLUTION RESPIRATORY (INHALATION) 4 TIMES DAILY PRN
Qty: 120 EACH | Refills: 0 | Status: SHIPPED | OUTPATIENT
Start: 2025-02-24

## 2025-02-24 RX ORDER — FLUTICASONE FUROATE 200 UG/1
1 POWDER RESPIRATORY (INHALATION) DAILY
Qty: 30 EACH | Refills: 5 | Status: SHIPPED | OUTPATIENT
Start: 2025-02-24

## 2025-02-24 NOTE — TELEPHONE ENCOUNTER
Eli called in and left a voicemail that she needs a refill on her Arnuity Ellipta. She also said when she was in the office last she was ordered Duoneb. Her insurance will not cover that, so she has been using her plain Albuterol nebulizer twice a day. She is wanting a refill of that or a something in place of the Duoneb. I called Patience back and asked her if her insurance listed a formulary alternative for the Duoneb. She said she is with her  in the hospital in [place] right now and isn't sure. She has a letter at home. She is asking if she could just get enough albuterol to get her through until she can find out if they cover an alternative. Next office visit is 7/28/25. Orders in and pended. Please advise.

## 2025-03-07 RX ORDER — ALBUTEROL SULFATE 0.83 MG/ML
2.5 SOLUTION RESPIRATORY (INHALATION) 4 TIMES DAILY PRN
Qty: 120 EACH | Refills: 0 | Status: SHIPPED | OUTPATIENT
Start: 2025-03-07

## 2025-03-07 NOTE — TELEPHONE ENCOUNTER
Patient called RX Needs to go to [facility] instead of her mail in. Please refill. Next appt 07/28/2025

## 2025-05-23 ENCOUNTER — HOSPITAL ENCOUNTER (OUTPATIENT)
Dept: GENERAL RADIOLOGY | Age: 75
Discharge: HOME | End: 2025-05-25
Payer: MEDICARE

## 2025-05-23 ENCOUNTER — HOSPITAL ENCOUNTER (OUTPATIENT)
Age: 75
Discharge: HOME | End: 2025-05-23
Payer: MEDICARE

## 2025-05-23 ENCOUNTER — HOSPITAL ENCOUNTER (OUTPATIENT)
Age: 75
Setting detail: SPECIMEN
Discharge: HOME | End: 2025-05-23

## 2025-05-23 DIAGNOSIS — N20.0 RENAL STONE: ICD-10-CM

## 2025-05-23 DIAGNOSIS — N76.0 ACUTE VAGINITIS: ICD-10-CM

## 2025-05-23 DIAGNOSIS — R35.0 URINARY FREQUENCY: ICD-10-CM

## 2025-05-23 DIAGNOSIS — R31.9 HEMATURIA, UNSPECIFIED TYPE: ICD-10-CM

## 2025-05-23 LAB
BILIRUB UR QL STRIP: NEGATIVE
CLARITY UR: CLEAR
COLOR UR: YELLOW
EPI CELLS #/AREA URNS HPF: ABNORMAL /HPF (ref 0–25)
GLUCOSE UR STRIP-MCNC: NEGATIVE MG/DL
HGB UR QL STRIP.AUTO: ABNORMAL
KETONES UR STRIP-MCNC: NEGATIVE MG/DL
LEUKOCYTE ESTERASE UR QL STRIP: NEGATIVE
MICROORGANISM/AGENT SPEC: NORMAL
NITRITE UR QL STRIP: NEGATIVE
PH UR STRIP: 6.5 [PH] (ref 5–9)
PROT UR STRIP-MCNC: NEGATIVE MG/DL
RBC #/AREA URNS HPF: ABNORMAL /HPF (ref 0–2)
SP GR UR STRIP: <1.005 (ref 1.01–1.02)
SPECIMEN DESCRIPTION: NORMAL
UROBILINOGEN UR STRIP-ACNC: NORMAL EU/DL (ref 0–1)
WBC #/AREA URNS HPF: ABNORMAL /HPF (ref 0–5)

## 2025-05-23 PROCEDURE — 81001 URINALYSIS AUTO W/SCOPE: CPT

## 2025-05-23 PROCEDURE — 87086 URINE CULTURE/COLONY COUNT: CPT

## 2025-05-23 PROCEDURE — 74018 RADEX ABDOMEN 1 VIEW: CPT

## 2025-05-23 PROCEDURE — 87210 SMEAR WET MOUNT SALINE/INK: CPT

## 2025-05-23 PROCEDURE — 87070 CULTURE OTHR SPECIMN AEROBIC: CPT

## 2025-05-26 LAB
MICROORGANISM SPEC CULT: NORMAL
SERVICE CMNT-IMP: NORMAL
SPECIMEN DESCRIPTION: NORMAL

## 2025-06-19 ENCOUNTER — HOSPITAL ENCOUNTER (OUTPATIENT)
Age: 75
Discharge: HOME | End: 2025-06-19
Payer: MEDICARE

## 2025-06-19 DIAGNOSIS — R79.9 ABNORMAL FINDING OF BLOOD CHEMISTRY, UNSPECIFIED: ICD-10-CM

## 2025-06-19 DIAGNOSIS — E78.2 MIXED HYPERLIPIDEMIA: ICD-10-CM

## 2025-06-19 DIAGNOSIS — M89.9 DISORDER OF BONE, UNSPECIFIED: ICD-10-CM

## 2025-06-19 DIAGNOSIS — R42 VERTIGO: ICD-10-CM

## 2025-06-19 DIAGNOSIS — I10 ESSENTIAL HYPERTENSION: ICD-10-CM

## 2025-06-19 LAB
25(OH)D3 SERPL-MCNC: 76.4 NG/ML (ref 30–100)
ALBUMIN SERPL-MCNC: 4.4 G/DL (ref 3.5–5.2)
ALBUMIN/GLOB SERPL: 1.2 {RATIO} (ref 1–2.5)
ALP SERPL-CCNC: 87 U/L (ref 35–104)
ALT SERPL-CCNC: 18 U/L (ref 10–35)
ANION GAP SERPL CALCULATED.3IONS-SCNC: 11 MMOL/L (ref 9–16)
AST SERPL-CCNC: 24 U/L (ref 10–35)
BILIRUB SERPL-MCNC: 0.4 MG/DL (ref 0–1.2)
BUN SERPL-MCNC: 12 MG/DL (ref 8–23)
BUN/CREAT SERPL: 17 (ref 9–20)
CALCIUM SERPL-MCNC: 9.5 MG/DL (ref 8.6–10.4)
CHLORIDE SERPL-SCNC: 102 MMOL/L (ref 98–107)
CO2 SERPL-SCNC: 25 MMOL/L (ref 20–31)
CREAT SERPL-MCNC: 0.7 MG/DL (ref 0.5–0.9)
ERYTHROCYTE [DISTWIDTH] IN BLOOD BY AUTOMATED COUNT: 12.3 % (ref 11.8–14.4)
EST. AVERAGE GLUCOSE BLD GHB EST-MCNC: 114 MG/DL
FOLATE SERPL-MCNC: 33 NG/ML (ref 4.8–24.2)
GFR, ESTIMATED: >90 ML/MIN/1.73M2
GLUCOSE SERPL-MCNC: 78 MG/DL (ref 74–99)
HBA1C MFR BLD: 5.6 % (ref 4–6)
HCT VFR BLD AUTO: 43.4 % (ref 36.3–47.1)
HGB BLD-MCNC: 14.3 G/DL (ref 11.9–15.1)
IRON SATN MFR SERPL: 43 % (ref 20–55)
IRON SERPL-MCNC: 143 UG/DL (ref 37–145)
MCH RBC QN AUTO: 31.6 PG (ref 25.2–33.5)
MCHC RBC AUTO-ENTMCNC: 32.9 G/DL (ref 28.4–34.8)
MCV RBC AUTO: 96 FL (ref 82.6–102.9)
NRBC BLD-RTO: 0 PER 100 WBC
PLATELET # BLD AUTO: 247 K/UL (ref 138–453)
PMV BLD AUTO: 8.6 FL (ref 8.1–13.5)
POTASSIUM SERPL-SCNC: 4.5 MMOL/L (ref 3.7–5.3)
PROT SERPL-MCNC: 8 G/DL (ref 6.6–8.7)
RBC # BLD AUTO: 4.52 M/UL (ref 3.95–5.11)
SODIUM SERPL-SCNC: 138 MMOL/L (ref 136–145)
TIBC SERPL-MCNC: 335 UG/DL (ref 250–450)
TSH SERPL DL<=0.05 MIU/L-ACNC: 1.19 UIU/ML (ref 0.27–4.2)
UNSATURATED IRON BINDING CAPACITY: 192 UG/DL (ref 112–347)
VIT B12 SERPL-MCNC: 493 PG/ML (ref 232–1245)
WBC OTHER # BLD: 8 K/UL (ref 3.5–11.3)

## 2025-06-19 PROCEDURE — 84443 ASSAY THYROID STIM HORMONE: CPT

## 2025-06-19 PROCEDURE — 82306 VITAMIN D 25 HYDROXY: CPT

## 2025-06-19 PROCEDURE — 82746 ASSAY OF FOLIC ACID SERUM: CPT

## 2025-06-19 PROCEDURE — 82607 VITAMIN B-12: CPT

## 2025-06-19 PROCEDURE — 83540 ASSAY OF IRON: CPT

## 2025-06-19 PROCEDURE — 80061 LIPID PANEL: CPT

## 2025-06-19 PROCEDURE — 80053 COMPREHEN METABOLIC PANEL: CPT

## 2025-06-19 PROCEDURE — 36415 COLL VENOUS BLD VENIPUNCTURE: CPT

## 2025-06-19 PROCEDURE — 83550 IRON BINDING TEST: CPT

## 2025-06-19 PROCEDURE — 83036 HEMOGLOBIN GLYCOSYLATED A1C: CPT

## 2025-06-19 PROCEDURE — 85027 COMPLETE CBC AUTOMATED: CPT

## 2025-06-20 LAB
CHOLEST SERPL-MCNC: 213 MG/DL (ref 0–199)
CHOLESTEROL/HDL RATIO: 2.7
HDLC SERPL-MCNC: 80 MG/DL
LDLC SERPL CALC-MCNC: 117 MG/DL (ref 0–100)
TRIGL SERPL-MCNC: 81 MG/DL
VLDLC SERPL CALC-MCNC: 16 MG/DL (ref 1–30)

## 2025-07-03 ENCOUNTER — OFFICE VISIT (OUTPATIENT)
Dept: OBGYN | Age: 75
End: 2025-07-03
Payer: MEDICARE

## 2025-07-03 VITALS
WEIGHT: 125 LBS | DIASTOLIC BLOOD PRESSURE: 84 MMHG | BODY MASS INDEX: 22.15 KG/M2 | SYSTOLIC BLOOD PRESSURE: 116 MMHG | HEIGHT: 63 IN

## 2025-07-03 DIAGNOSIS — N95.8 GENITOURINARY SYNDROME OF MENOPAUSE: Primary | ICD-10-CM

## 2025-07-03 DIAGNOSIS — L90.0 LICHEN SCLEROSUS: ICD-10-CM

## 2025-07-03 PROCEDURE — 99203 OFFICE O/P NEW LOW 30 MIN: CPT

## 2025-07-03 PROCEDURE — 3017F COLORECTAL CA SCREEN DOC REV: CPT

## 2025-07-03 PROCEDURE — 1090F PRES/ABSN URINE INCON ASSESS: CPT

## 2025-07-03 PROCEDURE — 1036F TOBACCO NON-USER: CPT

## 2025-07-03 PROCEDURE — 1123F ACP DISCUSS/DSCN MKR DOCD: CPT

## 2025-07-03 PROCEDURE — G8420 CALC BMI NORM PARAMETERS: HCPCS

## 2025-07-03 PROCEDURE — 1159F MED LIST DOCD IN RCRD: CPT

## 2025-07-03 PROCEDURE — 3074F SYST BP LT 130 MM HG: CPT

## 2025-07-03 PROCEDURE — G8427 DOCREV CUR MEDS BY ELIG CLIN: HCPCS

## 2025-07-03 PROCEDURE — G8399 PT W/DXA RESULTS DOCUMENT: HCPCS

## 2025-07-03 PROCEDURE — 3079F DIAST BP 80-89 MM HG: CPT

## 2025-07-03 RX ORDER — ESTRADIOL 0.1 MG/G
1 CREAM VAGINAL SEE ADMIN INSTRUCTIONS
Qty: 42.5 G | Refills: 3 | Status: SHIPPED | OUTPATIENT
Start: 2025-07-03

## 2025-07-03 RX ORDER — CLOBETASOL PROPIONATE 0.5 MG/G
OINTMENT TOPICAL
Qty: 60 G | Refills: 3 | Status: SHIPPED | OUTPATIENT
Start: 2025-07-03

## 2025-07-03 ASSESSMENT — ENCOUNTER SYMPTOMS
COLOR CHANGE: 0
CONSTIPATION: 0
NAUSEA: 0
WHEEZING: 0
ABDOMINAL DISTENTION: 0
ABDOMINAL PAIN: 0
VOMITING: 0
DIARRHEA: 0
CHEST TIGHTNESS: 0
SHORTNESS OF BREATH: 0
COUGH: 0

## 2025-07-03 NOTE — PROGRESS NOTES
CHIEF COMPLAINT:     Chief Complaint   Patient presents with    Vaginal Itching     Patient states that she has had problems with vaginal itching.  She was referred to our office from Pramod Hastings.  She also notes the increased urgency to urinate.  She states that the symptoms have been present for approx 6 weeks.        HPI:   Eli presents today with concerns for vaginal itching and urinary symptoms. She reports that she feels like she always has to go to the bathroom and when she does urinate, she will have burning and itching. She reports this started in May when she saw her PCP. She also reports diffuse vulvar itching. She reports her symptoms have improved some but they are still present. She had testing done with her PCP which was normal. She has not used anything for this at home.     REVIEW OF SYSTEMS:  Review of Systems   Constitutional:  Negative for activity change, chills, diaphoresis, fatigue and fever.   HENT:  Negative for congestion.    Respiratory:  Negative for cough, chest tightness, shortness of breath and wheezing.    Cardiovascular:  Negative for chest pain, palpitations and leg swelling.   Gastrointestinal:  Negative for abdominal distention, abdominal pain, constipation, diarrhea, nausea and vomiting.   Genitourinary:  Positive for dysuria. Negative for frequency, hematuria and urgency.        Vulvar itching  Burning/itching with urination   Musculoskeletal:  Negative for arthralgias.   Skin:  Negative for color change.   Neurological:  Negative for dizziness, speech difficulty, weakness, light-headedness, numbness and headaches.   Psychiatric/Behavioral:  Negative for agitation, behavioral problems, confusion, dysphoric mood, self-injury and suicidal ideas. The patient is not nervous/anxious.         PHYSICAL EXAM:  Constitutional:   Blood pressure 116/84, height 1.6 m (5' 3\"), weight 56.7 kg (125 lb), not currently breastfeeding.  Wt Readings from Last 3 Encounters:   07/03/25 56.7

## 2025-07-18 ENCOUNTER — HOSPITAL ENCOUNTER (OUTPATIENT)
Dept: WOMENS IMAGING | Age: 75
Discharge: HOME | End: 2025-07-20
Payer: MEDICARE

## 2025-07-18 DIAGNOSIS — Z12.31 ENCOUNTER FOR SCREENING MAMMOGRAM FOR BREAST CANCER: ICD-10-CM

## 2025-07-18 PROCEDURE — 77063 BREAST TOMOSYNTHESIS BI: CPT

## 2025-08-07 ENCOUNTER — OFFICE VISIT (OUTPATIENT)
Dept: OBGYN | Age: 75
End: 2025-08-07
Payer: MEDICARE

## 2025-08-07 VITALS
DIASTOLIC BLOOD PRESSURE: 70 MMHG | SYSTOLIC BLOOD PRESSURE: 116 MMHG | HEIGHT: 63 IN | BODY MASS INDEX: 22.68 KG/M2 | WEIGHT: 128 LBS

## 2025-08-07 DIAGNOSIS — N95.8 GENITOURINARY SYNDROME OF MENOPAUSE: Primary | ICD-10-CM

## 2025-08-07 DIAGNOSIS — L90.0 LICHEN SCLEROSUS: ICD-10-CM

## 2025-08-07 PROCEDURE — 1159F MED LIST DOCD IN RCRD: CPT

## 2025-08-07 PROCEDURE — 3078F DIAST BP <80 MM HG: CPT

## 2025-08-07 PROCEDURE — 3074F SYST BP LT 130 MM HG: CPT

## 2025-08-07 PROCEDURE — G8420 CALC BMI NORM PARAMETERS: HCPCS

## 2025-08-07 PROCEDURE — 1090F PRES/ABSN URINE INCON ASSESS: CPT

## 2025-08-07 PROCEDURE — 99212 OFFICE O/P EST SF 10 MIN: CPT

## 2025-08-07 PROCEDURE — 1036F TOBACCO NON-USER: CPT

## 2025-08-07 PROCEDURE — G8427 DOCREV CUR MEDS BY ELIG CLIN: HCPCS

## 2025-08-07 PROCEDURE — 3017F COLORECTAL CA SCREEN DOC REV: CPT

## 2025-08-07 PROCEDURE — 1123F ACP DISCUSS/DSCN MKR DOCD: CPT

## 2025-08-07 PROCEDURE — G8399 PT W/DXA RESULTS DOCUMENT: HCPCS

## 2025-08-07 ASSESSMENT — ENCOUNTER SYMPTOMS
SHORTNESS OF BREATH: 0
CHEST TIGHTNESS: 0
COUGH: 0
VOMITING: 0
ABDOMINAL DISTENTION: 0
ABDOMINAL PAIN: 0
CONSTIPATION: 0
NAUSEA: 0
WHEEZING: 0
DIARRHEA: 0
COLOR CHANGE: 0

## (undated) DEVICE — GAUZE,SPONGE,FLUFF,6"X6.75",STRL,5/TRAY: Brand: MEDLINE

## (undated) DEVICE — SUTURE NONABSORBABLE MONOFILAMENT 3-0 PS-1 18 IN BLK ETHILON 1663H

## (undated) DEVICE — BANDAGE COMPR W4INXL9FT EXSANG SGL LAYERED NO CLSR ESMARCH

## (undated) DEVICE — DBD-PACK,BASIC,VI,AURORA: Brand: MEDLINE

## (undated) DEVICE — DRAPE SURG W48XL52IN POLY U FEN REINF ADV ADH MATTE FINISH

## (undated) DEVICE — SHEET,DRAPE,53X77,STERILE: Brand: MEDLINE

## (undated) DEVICE — DRESSING,GAUZE,XEROFORM,CURAD,5"X9",ST: Brand: CURAD

## (undated) DEVICE — HAND AND FT PK

## (undated) DEVICE — PEN: MARKING STD 100/CS: Brand: MEDICAL ACTION INDUSTRIES

## (undated) DEVICE — GLOVE SURG SZ 75 L12IN FNGR THK87MIL WHT LTX FREE

## (undated) DEVICE — DRAPE,EXTREMITY,89X128,STERILE: Brand: MEDLINE

## (undated) DEVICE — SOLUTION IRRIG 1000ML 0.9% SOD CHL USP POUR PLAS BTL

## (undated) DEVICE — SYRINGE MED 10ML LUERLOCK TIP W/O SFTY DISP

## (undated) DEVICE — NEEDLE,25GX1.5",REG,BEVEL: Brand: MEDLINE

## (undated) DEVICE — SYRINGE MED 20ML STD CLR PLAS LUERLOCK TIP N CTRL DISP

## (undated) DEVICE — PADDING,UNDERCAST,COTTON, 3X4YD STERILE: Brand: MEDLINE

## (undated) DEVICE — SYRINGE IRRIG 60ML SFT PLIABLE BLB EZ TO GRP 1 HND USE W/

## (undated) DEVICE — BANDAGE COBAN 6 IN WND 6INX5YD FOAM

## (undated) DEVICE — GLOVE SURG SZ 75 L12IN FNGR THK79MIL GRN LTX FREE

## (undated) DEVICE — YANKAUER,FLEXIBLE HANDLE,REGLR CAPACITY: Brand: MEDLINE INDUSTRIES, INC.

## (undated) DEVICE — DRESSING PETRO W3XL8IN OIL EMUL N ADH GZ KNIT IMPREG CELOS

## (undated) DEVICE — SOLUTION SCRB 4OZ 4% CHG CLN BASE FOR PT SKIN ANTISEPSIS

## (undated) DEVICE — BLADE,STAINLESS-STEEL,15,STRL,DISPOSABLE: Brand: MEDLINE

## (undated) DEVICE — BANDAGE,GAUZE,BULKEE II,4.5"X4.1YD,STRL: Brand: MEDLINE

## (undated) DEVICE — TUBING, SUCTION, 9/32" X 12', STRAIGHT: Brand: MEDLINE INDUSTRIES, INC.

## (undated) DEVICE — THREE-QUARTER SHEET: Brand: CONVERTORS

## (undated) DEVICE — NEEDLE HYPO 27GA L1.25IN GRY POLYPR HUB S STL REG BVL STR

## (undated) DEVICE — GLOVE SURG SZ 75 L12IN FNGR THK87MIL DK GRN LTX FREE ISOLEX

## (undated) DEVICE — SOLUTION IV IRRIG POUR BRL 0.9% SODIUM CHL 2F7124

## (undated) DEVICE — BLADE OPHTH 180DEG CUT SURF BLU STR SHRP DBL BVL GRINDLESS

## (undated) DEVICE — SPONGE LAP W18XL18IN WHT COT 4 PLY FLD STRUNG RADPQ DISP ST 2 PER PACK